# Patient Record
Sex: FEMALE | Race: WHITE | Employment: OTHER | URBAN - METROPOLITAN AREA
[De-identification: names, ages, dates, MRNs, and addresses within clinical notes are randomized per-mention and may not be internally consistent; named-entity substitution may affect disease eponyms.]

---

## 2017-01-01 ENCOUNTER — HOSPITAL ENCOUNTER (EMERGENCY)
Age: 72
Discharge: HOME | End: 2017-01-02
Attending: FAMILY MEDICINE | Admitting: FAMILY MEDICINE
Payer: MEDICARE

## 2017-01-01 ENCOUNTER — APPOINTMENT (OUTPATIENT)
Dept: GENERAL RADIOLOGY | Age: 72
End: 2017-01-01
Attending: FAMILY MEDICINE
Payer: MEDICARE

## 2017-01-01 DIAGNOSIS — R07.89 ATYPICAL CHEST PAIN: Primary | ICD-10-CM

## 2017-01-01 LAB
ALBUMIN SERPL BCP-MCNC: 4.2 G/DL (ref 3.4–5)
ALBUMIN/GLOB SERPL: 1.3 {RATIO} (ref 0.8–1.7)
ALP SERPL-CCNC: 52 U/L (ref 45–117)
ALT SERPL-CCNC: 19 U/L (ref 13–56)
ANION GAP BLD CALC-SCNC: 8 MMOL/L (ref 3–18)
AST SERPL W P-5'-P-CCNC: 12 U/L (ref 15–37)
BASOPHILS # BLD AUTO: 0 K/UL (ref 0–0.06)
BASOPHILS # BLD: 0 % (ref 0–2)
BILIRUB SERPL-MCNC: 0.3 MG/DL (ref 0.2–1)
BUN SERPL-MCNC: 18 MG/DL (ref 7–18)
BUN/CREAT SERPL: 22 (ref 12–20)
CALCIUM SERPL-MCNC: 8.9 MG/DL (ref 8.5–10.1)
CHLORIDE SERPL-SCNC: 102 MMOL/L (ref 100–108)
CK MB CFR SERPL CALC: ABNORMAL % (ref 0–4)
CK MB SERPL-MCNC: <0.5 NG/ML (ref 0.5–3.6)
CK SERPL-CCNC: 62 U/L (ref 26–192)
CO2 SERPL-SCNC: 29 MMOL/L (ref 21–32)
CREAT SERPL-MCNC: 0.83 MG/DL (ref 0.6–1.3)
DIFFERENTIAL METHOD BLD: ABNORMAL
EOSINOPHIL # BLD: 0.1 K/UL (ref 0–0.4)
EOSINOPHIL NFR BLD: 1 % (ref 0–5)
ERYTHROCYTE [DISTWIDTH] IN BLOOD BY AUTOMATED COUNT: 13.3 % (ref 11.6–14.5)
GLOBULIN SER CALC-MCNC: 3.2 G/DL (ref 2–4)
GLUCOSE SERPL-MCNC: 125 MG/DL (ref 74–99)
HCT VFR BLD AUTO: 35.7 % (ref 35–45)
HGB BLD-MCNC: 11.6 G/DL (ref 12–16)
LYMPHOCYTES # BLD AUTO: 11 % (ref 21–52)
LYMPHOCYTES # BLD: 1.1 K/UL (ref 0.9–3.6)
MAGNESIUM SERPL-MCNC: 1.7 MG/DL (ref 1.8–2.4)
MCH RBC QN AUTO: 29.8 PG (ref 24–34)
MCHC RBC AUTO-ENTMCNC: 32.5 G/DL (ref 31–37)
MCV RBC AUTO: 91.8 FL (ref 74–97)
MONOCYTES # BLD: 0.8 K/UL (ref 0.05–1.2)
MONOCYTES NFR BLD AUTO: 9 % (ref 3–10)
NEUTS SEG # BLD: 7.8 K/UL (ref 1.8–8)
NEUTS SEG NFR BLD AUTO: 79 % (ref 40–73)
PLATELET # BLD AUTO: 305 K/UL (ref 135–420)
PMV BLD AUTO: 9.4 FL (ref 9.2–11.8)
POTASSIUM SERPL-SCNC: 3.9 MMOL/L (ref 3.5–5.5)
PROT SERPL-MCNC: 7.4 G/DL (ref 6.4–8.2)
RBC # BLD AUTO: 3.89 M/UL (ref 4.2–5.3)
SODIUM SERPL-SCNC: 139 MMOL/L (ref 136–145)
TROPONIN I SERPL-MCNC: <0.02 NG/ML (ref 0–0.06)
WBC # BLD AUTO: 9.9 K/UL (ref 4.6–13.2)

## 2017-01-01 PROCEDURE — 99285 EMERGENCY DEPT VISIT HI MDM: CPT

## 2017-01-01 PROCEDURE — 93005 ELECTROCARDIOGRAM TRACING: CPT

## 2017-01-01 PROCEDURE — 71010 XR CHEST PORT: CPT

## 2017-01-01 PROCEDURE — 85379 FIBRIN DEGRADATION QUANT: CPT | Performed by: FAMILY MEDICINE

## 2017-01-01 PROCEDURE — 83735 ASSAY OF MAGNESIUM: CPT | Performed by: FAMILY MEDICINE

## 2017-01-01 PROCEDURE — 96360 HYDRATION IV INFUSION INIT: CPT

## 2017-01-01 PROCEDURE — 80053 COMPREHEN METABOLIC PANEL: CPT | Performed by: FAMILY MEDICINE

## 2017-01-01 PROCEDURE — 82550 ASSAY OF CK (CPK): CPT | Performed by: FAMILY MEDICINE

## 2017-01-01 PROCEDURE — 85025 COMPLETE CBC W/AUTO DIFF WBC: CPT | Performed by: FAMILY MEDICINE

## 2017-01-01 PROCEDURE — 96361 HYDRATE IV INFUSION ADD-ON: CPT

## 2017-01-02 VITALS
BODY MASS INDEX: 27.62 KG/M2 | DIASTOLIC BLOOD PRESSURE: 50 MMHG | OXYGEN SATURATION: 95 % | HEART RATE: 86 BPM | HEIGHT: 67 IN | TEMPERATURE: 98.2 F | SYSTOLIC BLOOD PRESSURE: 126 MMHG | RESPIRATION RATE: 17 BRPM | WEIGHT: 176 LBS

## 2017-01-02 LAB
APPEARANCE UR: CLEAR
ATRIAL RATE: 113 BPM
ATRIAL RATE: 91 BPM
BILIRUB UR QL: NEGATIVE
CALCULATED P AXIS, ECG09: 74 DEGREES
CALCULATED P AXIS, ECG09: 87 DEGREES
CALCULATED R AXIS, ECG10: 26 DEGREES
CALCULATED R AXIS, ECG10: 45 DEGREES
CALCULATED T AXIS, ECG11: 42 DEGREES
CALCULATED T AXIS, ECG11: 58 DEGREES
CK MB CFR SERPL CALC: 1.9 % (ref 0–4)
CK MB SERPL-MCNC: 1.1 NG/ML (ref 0.5–3.6)
CK SERPL-CCNC: 58 U/L (ref 26–192)
COLOR UR: YELLOW
D DIMER PPP FEU-MCNC: <0.27 UG/ML(FEU)
DIAGNOSIS, 93000: NORMAL
DIAGNOSIS, 93000: NORMAL
FLUAV AG NPH QL IA: NEGATIVE
FLUBV AG NOSE QL IA: NEGATIVE
GLUCOSE UR STRIP.AUTO-MCNC: NEGATIVE MG/DL
HGB UR QL STRIP: NEGATIVE
KETONES UR QL STRIP.AUTO: NEGATIVE MG/DL
LEUKOCYTE ESTERASE UR QL STRIP.AUTO: NEGATIVE
NITRITE UR QL STRIP.AUTO: NEGATIVE
P-R INTERVAL, ECG05: 162 MS
P-R INTERVAL, ECG05: 188 MS
PH UR STRIP: 5.5 [PH] (ref 5–8)
PROT UR STRIP-MCNC: NEGATIVE MG/DL
Q-T INTERVAL, ECG07: 328 MS
Q-T INTERVAL, ECG07: 378 MS
QRS DURATION, ECG06: 88 MS
QRS DURATION, ECG06: 94 MS
QTC CALCULATION (BEZET), ECG08: 449 MS
QTC CALCULATION (BEZET), ECG08: 464 MS
SP GR UR REFRACTOMETRY: 1.01 (ref 1–1.03)
TROPONIN I SERPL-MCNC: 0.05 NG/ML (ref 0–0.06)
UROBILINOGEN UR QL STRIP.AUTO: 0.2 EU/DL (ref 0.2–1)
VENTRICULAR RATE, ECG03: 113 BPM
VENTRICULAR RATE, ECG03: 91 BPM

## 2017-01-02 PROCEDURE — 93005 ELECTROCARDIOGRAM TRACING: CPT

## 2017-01-02 PROCEDURE — 82553 CREATINE MB FRACTION: CPT | Performed by: FAMILY MEDICINE

## 2017-01-02 PROCEDURE — 74011250636 HC RX REV CODE- 250/636: Performed by: FAMILY MEDICINE

## 2017-01-02 PROCEDURE — 87804 INFLUENZA ASSAY W/OPTIC: CPT | Performed by: FAMILY MEDICINE

## 2017-01-02 PROCEDURE — 81003 URINALYSIS AUTO W/O SCOPE: CPT | Performed by: FAMILY MEDICINE

## 2017-01-02 RX ADMIN — SODIUM CHLORIDE 1000 ML: 900 INJECTION, SOLUTION INTRAVENOUS at 00:23

## 2017-01-02 NOTE — ED NOTES
I have reviewed discharge instructions with the patient. The patient verbalized understanding. Patient armband removed and shredded  Pt out of ER with daughter.

## 2017-01-02 NOTE — ED TRIAGE NOTES
Patient arrives via EMS for cold symptoms. Patient states she was seen Thursday, dx with sinus infection and taking sulfa. Sepsis Screening completed    (  )Patient meets SIRS criteria. ( x )Patient does not meet SIRS criteria.       SIRS Criteria is achieved when two or more of the following are present   Temperature < 96.8°F (36°C) or > 100.9°F (38.3°C)   Heart Rate > 90 beats per minute   Respiratory Rate > 20 beats per minute   WBC count > 12,000 or <4,000 or > 10% bands

## 2017-01-02 NOTE — DISCHARGE INSTRUCTIONS
Chest Pain: Care Instructions  Your Care Instructions  There are many things that can cause chest pain. Some are not serious and will get better on their own in a few days. But some kinds of chest pain need more testing and treatment. Your doctor may have recommended a follow-up visit in the next 8 to 12 hours. If you are not getting better, you may need more tests or treatment. Even though your doctor has released you, you still need to watch for any problems. The doctor carefully checked you, but sometimes problems can develop later. If you have new symptoms or if your symptoms do not get better, get medical care right away. If you have worse or different chest pain or pressure that lasts more than 5 minutes or you passed out (lost consciousness), call 911 or seek other emergency help right away. A medical visit is only one step in your treatment. Even if you feel better, you still need to do what your doctor recommends, such as going to all suggested follow-up appointments and taking medicines exactly as directed. This will help you recover and help prevent future problems. How can you care for yourself at home? · Rest until you feel better. · Take your medicine exactly as prescribed. Call your doctor if you think you are having a problem with your medicine. · Do not drive after taking a prescription pain medicine. When should you call for help? Call 911 if:  · You passed out (lost consciousness). · You have severe difficulty breathing. · You have symptoms of a heart attack. These may include:  ¨ Chest pain or pressure, or a strange feeling in your chest.  ¨ Sweating. ¨ Shortness of breath. ¨ Nausea or vomiting. ¨ Pain, pressure, or a strange feeling in your back, neck, jaw, or upper belly or in one or both shoulders or arms. ¨ Lightheadedness or sudden weakness. ¨ A fast or irregular heartbeat.   After you call 911, the  may tell you to chew 1 adult-strength or 2 to 4 low-dose aspirin. Wait for an ambulance. Do not try to drive yourself. Call your doctor today if:  · You have any trouble breathing. · Your chest pain gets worse. · You are dizzy or lightheaded, or you feel like you may faint. · You are not getting better as expected. · You are having new or different chest pain. Where can you learn more? Go to http://devendra-adam.info/. Enter A120 in the search box to learn more about \"Chest Pain: Care Instructions. \"  Current as of: May 27, 2016  Content Version: 11.1  © 7103-0756 [employer]. Care instructions adapted under license by [employer] (which disclaims liability or warranty for this information). If you have questions about a medical condition or this instruction, always ask your healthcare professional. [address] any warranty or liability for your use of this information.

## 2017-01-02 NOTE — ED PROVIDER NOTES
[address]  EMERGENCY DEPARTMENT HISTORY AND PHYSICAL EXAM       Date: 1/1/2017   Patient Name: Heavenly Donato   YOB: 1945  Medical Record Number: [id number]    History of Presenting Illness     Chief Complaint   Patient presents with    Cold Symptoms        History Provided By:  patient    Additional History:   11:13 PM  Heavenly Donato is a 67 y.o. female who presents to the emergency department C/O nausea. Associated sxs of flushing of the face and a \"pounding throughout her body\", diarrhea, and palpitations. Her palpitations have improved in ED. Her pulse at home was in the 120's. She was taken to the fire station and was told to come the ED. She was seen 3 days ago and diagnosed with a sinus infection. She is currently taking Sulfa. Hx of MI, emphysema, COPD. Her current sxs do not feel like her MI sxs. No recent travel or hormone usage. She does not smoke. She is on chronic oxygen for emphysema and COPD. Patient denies CP, leg swelling, abdominal pain, SOB, and any other sxs and complaints. Primary Care Provider: Myrna Gilbert MD   Specialist:    Past History     Past Medical History:   Past Medical History   Diagnosis Date    Asthma     CAD (coronary artery disease)      MI    Cancer ([address]     Chronic obstructive pulmonary disease ([address]     Diverticulitis     Hypertension     [facility] [address]         Past Surgical History:   Past Surgical History   Procedure Laterality Date    Hx orthopaedic       cervical, lumbar    Hx gyn      Hx hysterectomy      Pr cardiac surg procedure unlist       04/16 Stent        Family History:   History reviewed. No pertinent family history. Social History:   Social History   Substance Use Topics    Smoking status: Former Smoker    Smokeless tobacco: None    Alcohol use No        Allergies:    Allergies   Allergen Reactions    Augmentin [Amoxicillin-Pot Clavulanate] Diarrhea    Lexapro [Escitalopram] Rash    Zoloft [Sertraline] Rash        Review of Systems   Review of Systems   Constitutional: Negative for fatigue. HENT: Negative for rhinorrhea and sore throat. Respiratory: Negative for cough and shortness of breath. Cardiovascular: Positive for palpitations. Negative for chest pain. Gastrointestinal: Positive for diarrhea and nausea. Negative for abdominal pain and vomiting. Genitourinary: Negative for difficulty urinating and dysuria. Musculoskeletal: Negative for arthralgias and myalgias. Skin: Negative for color change and rash. Neurological: Negative for light-headedness and headaches. All other systems reviewed and are negative. Physical Exam  Vitals:    01/02/17 0100 01/02/17 0200 01/02/17 0323 01/02/17 0400   BP: 136/59 146/66 126/57 126/50   Pulse: 99 92 92 86   Resp: 14 16 15 17   Temp:       SpO2: 98% 99% 99% 95%   Weight:       Height:           Physical Exam   Nursing note and vitals reviewed. Vital signs and nursing notes reviewed    CONSTITUTIONAL: Anxious, hoarse. Alert, in no apparent distress; well-developed; well-nourished. HEAD:  Normocephalic, atraumatic  EYES: PERRL; EOM's intact. ENTM: Nose: no rhinorrhea; Throat: no erythema or exudate, mucous membranes moist  Neck:  No JVD, supple without lymphadenopathy  RESP: Chest clear, equal breath sounds. CV: Mild tachycardia. S1 and S2 WNL; No murmurs, gallops or rubs. GI: Normal bowel sounds, abdomen soft and non-tender. No masses or organomegaly. : No costo-vertebral angle tenderness. BACK:  Non-tender  UPPER EXT:  Normal inspection  LOWER EXT: No edema, no calf tenderness. Distal pulses intact. NEURO: CN intact, reflexes 2/4 and sym, strength 5/5 and sym, sensation intact. SKIN: No rashes; Normal for age and stage.       Diagnostic Study Results     Labs -      Recent Results (from the past 12 hour(s))   EKG, 12 LEAD, INITIAL    Collection Time: 01/01/17 10:54 PM   Result Value Ref Range    Ventricular Rate 113 BPM Atrial Rate 113 BPM    P-R Interval 162 ms    QRS Duration 88 ms    Q-T Interval 328 ms    QTC Calculation (Bezet) 449 ms    Calculated P Axis 87 degrees    Calculated R Axis 45 degrees    Calculated T Axis 58 degrees    Diagnosis       Sinus tachycardia  Nonspecific ST abnormality  Abnormal ECG  When compared with ECG of 13-OCT-2016 22:27,  No significant change was found     CBC WITH AUTOMATED DIFF    Collection Time: 01/01/17 10:58 PM   Result Value Ref Range    WBC 9.9 4.6 - 13.2 K/uL    RBC 3.89 (L) 4.20 - 5.30 M/uL    HGB 11.6 (L) 12.0 - 16.0 g/dL    HCT 35.7 35.0 - 45.0 %    MCV 91.8 74.0 - 97.0 FL    MCH 29.8 24.0 - 34.0 PG    MCHC 32.5 31.0 - 37.0 g/dL    RDW 13.3 11.6 - 14.5 %    PLATELET 039 401 - 056 K/uL    MPV 9.4 9.2 - 11.8 FL    NEUTROPHILS 79 (H) 40 - 73 %    LYMPHOCYTES 11 (L) 21 - 52 %    MONOCYTES 9 3 - 10 %    EOSINOPHILS 1 0 - 5 %    BASOPHILS 0 0 - 2 %    ABS. NEUTROPHILS 7.8 1.8 - 8.0 K/UL    ABS. LYMPHOCYTES 1.1 0.9 - 3.6 K/UL    ABS. MONOCYTES 0.8 0.05 - 1.2 K/UL    ABS. EOSINOPHILS 0.1 0.0 - 0.4 K/UL    ABS. BASOPHILS 0.0 0.0 - 0.06 K/UL    DF AUTOMATED     METABOLIC PANEL, COMPREHENSIVE    Collection Time: 01/01/17 10:58 PM   Result Value Ref Range    Sodium 139 136 - 145 mmol/L    Potassium 3.9 3.5 - 5.5 mmol/L    Chloride 102 100 - 108 mmol/L    CO2 29 21 - 32 mmol/L    Anion gap 8 3.0 - 18 mmol/L    Glucose 125 (H) 74 - 99 mg/dL    BUN 18 7.0 - 18 MG/DL    Creatinine 0.83 0.6 - 1.3 MG/DL    BUN/Creatinine ratio 22 (H) 12 - 20      GFR est AA >60 >60 ml/min/1.73m2    GFR est non-AA >60 >60 ml/min/1.73m2    Calcium 8.9 8.5 - 10.1 MG/DL    Bilirubin, total 0.3 0.2 - 1.0 MG/DL    ALT 19 13 - 56 U/L    AST 12 (L) 15 - 37 U/L    Alk.  phosphatase 52 45 - 117 U/L    Protein, total 7.4 6.4 - 8.2 g/dL    Albumin 4.2 3.4 - 5.0 g/dL    Globulin 3.2 2.0 - 4.0 g/dL    A-G Ratio 1.3 0.8 - 1.7     MAGNESIUM    Collection Time: 01/01/17 10:58 PM   Result Value Ref Range    Magnesium 1.7 (L) 1.8 - 2.4 mg/dL   CARDIAC PANEL,(CK, CKMB & TROPONIN)    Collection Time: 01/01/17 10:58 PM   Result Value Ref Range    CK 62 26 - 192 U/L    CK - MB <0.5 (L) 0.5 - 3.6 ng/ml    CK-MB Index CANNOT BE CALCULATED 0.0 - 4.0 %    Troponin-I, Qt. <0.02 0.00 - 0.06 NG/ML   D DIMER    Collection Time: 01/01/17 10:58 PM   Result Value Ref Range    D DIMER <0.27 <0.46 ug/ml(FEU)   URINALYSIS W/ RFLX MICROSCOPIC    Collection Time: 01/02/17 12:20 AM   Result Value Ref Range    Color YELLOW      Appearance CLEAR      Specific gravity 1.006 1.005 - 1.030      pH (UA) 5.5 5.0 - 8.0      Protein NEGATIVE  NEG mg/dL    Glucose NEGATIVE  NEG mg/dL    Ketone NEGATIVE  NEG mg/dL    Bilirubin NEGATIVE  NEG      Blood NEGATIVE  NEG      Urobilinogen 0.2 0.2 - 1.0 EU/dL    Nitrites NEGATIVE  NEG      Leukocyte Esterase NEGATIVE  NEG     INFLUENZA A & B AG (RAPID TEST)    Collection Time: 01/02/17 12:20 AM   Result Value Ref Range    Influenza A Antigen NEGATIVE  NEG      Influenza B Antigen NEGATIVE  NEG     CARDIAC PANEL,(CK, CKMB & TROPONIN)    Collection Time: 01/02/17  2:00 AM   Result Value Ref Range    CK 58 26 - 192 U/L    CK - MB 1.1 0.5 - 3.6 ng/ml    CK-MB Index 1.9 0.0 - 4.0 %    Troponin-I, Qt. 0.05 0.00 - 0.06 NG/ML   EKG, 12 LEAD, SUBSEQUENT    Collection Time: 01/02/17  2:06 AM   Result Value Ref Range    Ventricular Rate 91 BPM    Atrial Rate 91 BPM    P-R Interval 188 ms    QRS Duration 94 ms    Q-T Interval 378 ms    QTC Calculation (Bezet) 464 ms    Calculated P Axis 74 degrees    Calculated R Axis 26 degrees    Calculated T Axis 42 degrees    Diagnosis       Normal sinus rhythm  Normal ECG  When compared with ECG of 01-JAN-2017 22:54,  No significant change was found         Radiologic Studies -    RADIOLOGY FINDINGS  Chest X-ray shows NAP  Pending review by Radiologist  Recorded by Ania Schaffer, ED Scribe, as dictated by Bob Larson MD   XR CHEST PORT    (Results Pending)        Medical Decision Making   I am the first provider for this patient. I reviewed the vital signs, available nursing notes, past medical history, past surgical history, family history and social history. Vital Signs-Reviewed the patient's vital signs. Patient Vitals for the past 12 hrs:   Temp Pulse Resp BP SpO2   01/02/17 0400 - 86 17 126/50 95 %   01/02/17 0323 - 92 15 126/57 99 %   01/02/17 0200 - 92 16 146/66 99 %   01/02/17 0100 - 99 14 136/59 98 %   01/02/17 0000 - 98 16 132/49 97 %   01/01/17 2345 - 98 18 128/55 96 %   01/01/17 2330 - 97 16 135/63 98 %   01/01/17 2315 - (!) 102 15 138/68 99 %   01/01/17 2251 98.2 °F (36.8 °C) (!) 108 18 157/66 98 %       Pulse Oximetry Analysis - Normal 99% on room air     Cardiac Monitor:   Rate: 107 bpm  Rhythm: Sinus Tachycardia      EKG interpretation: (Preliminary)  Rate: 113 bpm; sinus tachycardia, nonspecific ST abnormality  EKG read by Elis Arellano MD at 11:19 PM     EKG interpretation: (Secondary)  Rate: 91 bpm; NSR, normal ECG  EKG read by Elis Arellano MD at 2:13 AM      Old Medical Records: Old medical records. Previous electrocardiograms. Previous radiology studies. Provider Notes:     INITIAL CLINICAL IMPRESSION and PLANS:  The patient presents with the primary complaint(s) of: nausea/vomiting. The presentation, to include historical aspects and clinical findings are consistent with the DX of atypical chest pain. However, other possible DX's to consider as primary, associated with, or exacerbated by include:    1. Anxiety  2. Anemia   3. Electrolyte abnormality    Considering the above, my initial management plan to evaluate and therapeutic interventions include the following and as noted in the orders:    1. Labs: CBC, CMP, Magnesium, Cardiac panel, Urinalysis, Influenza A and B  2.   Imaging:   CXR, EKG    Procedures: none    ED Course:    Medications Given in the ED:  Medications   sodium chloride 0.9 % bolus infusion 1,000 mL (0 mL IntraVENous IV Completed 1/2/17 2131)        PROGRESS NOTE:  11:17 PM   Initial assessment performed. 12:15 AM  HR has fluctuated between 108 and 120. Will get D Dimer since she has intermediate risk. 4:00 AM   Feeling much better, no CP, no SOB, no nausea. Her vitals are stable. She is ready for discharge. Discharge Note:  4:00 AM  Pt has been reexamined. Patient has no new complaints, changes, or physical findings. Care plan outlined and precautions discussed. Results were reviewed with the patient. All medications were reviewed with the patient; will d/c home with no prescriptions. All of pt's questions and concerns were addressed. Patient was instructed and agrees to follow up with PCP, as well as to return to the ED upon further deterioration. Patient is ready to go home. Critical Care Time: none    Diagnosis   Clinical Impression:   1. Atypical chest pain         Discussion:  Pt who was recently treated for sinus infection. She was at a birthday party when she started experiencing epigastric and chest discomfort and sensation that she had to have some diarrhea. She was tachycardic on arrival. She has nonspecific EKG changes due to rate. CE negative. After fluids, repeat EKG and CE were negative. Flu swab negative, white count nl, CXR negative. D Dimer was done due to intermediate risk, was negative. Sxs completely resolved, will be discharged home. Will arrange for her to have an outpatient US done. Follow-up Information     Follow up With Details Comments ADEN Albrecht MD Call As needed [phone] [id number]      [facility] EMERGENCY DEPT  As needed, If symptoms worsen [address]  [phone]          Current Discharge Medication List      CONTINUE these medications which have NOT CHANGED    Details   clopidogrel (PLAVIX) 75 mg tablet Take 75 mg by mouth daily.       pantoprazole (PROTONIX) 40 mg tablet Take 40 mg by mouth daily.      omega-3 fatty acids-vitamin e (FISH OIL) 1,000 mg cap Take 1 Cap by mouth. Dosage is 1200mg/600mg once daily      cyanocobalamin 1,000 mcg tablet Take 1,000 mcg by mouth daily. losartan (COZAAR) 25 mg tablet Take 2 Tabs by mouth daily. Qty: 30 Tab, Refills: 3      metoprolol (LOPRESSOR) 25 mg tablet Take 1 Tab by mouth every twelve (12) hours. Qty: 60 Tab, Refills: 3      BUDESONIDE/FORMOTEROL FUMARATE (SYMBICORT IN) Take 2 Puffs by inhalation two (2) times a day. albuterol (PROVENTIL HFA, VENTOLIN HFA, PROAIR HFA) 90 mcg/actuation inhaler Take 1 Puff by inhalation as needed for Wheezing. tiotropium (SPIRIVA) 18 mcg inhalation capsule Take 1 Cap by inhalation daily. loratadine (CLARITIN) 10 mg tablet Take 10 mg by mouth daily. allergy injection by SubCUTAneous route. Does not know dosage             _______________________________   Attestations: This note is prepared by Toby Ramos, acting as a Scribe for Angie Ledesma MD on 11:13 PM on 1/1/2017. Angie Ledesma MD: The scribe's documentation has been prepared under my direction and personally reviewed by me in its entirety.   _______________________________

## 2017-02-01 ENCOUNTER — APPOINTMENT (OUTPATIENT)
Dept: GENERAL RADIOLOGY | Age: 72
End: 2017-02-01
Attending: EMERGENCY MEDICINE
Payer: MEDICARE

## 2017-02-01 ENCOUNTER — HOSPITAL ENCOUNTER (EMERGENCY)
Age: 72
Discharge: HOME | End: 2017-02-01
Attending: EMERGENCY MEDICINE | Admitting: EMERGENCY MEDICINE
Payer: MEDICARE

## 2017-02-01 VITALS
WEIGHT: 176 LBS | HEIGHT: 67 IN | SYSTOLIC BLOOD PRESSURE: 117 MMHG | HEART RATE: 91 BPM | BODY MASS INDEX: 27.62 KG/M2 | DIASTOLIC BLOOD PRESSURE: 53 MMHG | TEMPERATURE: 98 F | OXYGEN SATURATION: 100 % | RESPIRATION RATE: 17 BRPM

## 2017-02-01 DIAGNOSIS — J44.1 ACUTE EXACERBATION OF CHRONIC OBSTRUCTIVE PULMONARY DISEASE (COPD) (HCC): Primary | ICD-10-CM

## 2017-02-01 DIAGNOSIS — Z99.81 OXYGEN DEPENDENT: ICD-10-CM

## 2017-02-01 LAB
ALBUMIN SERPL BCP-MCNC: 4.1 G/DL (ref 3.4–5)
ALBUMIN/GLOB SERPL: 1.3 {RATIO} (ref 0.8–1.7)
ALP SERPL-CCNC: 54 U/L (ref 45–117)
ALT SERPL-CCNC: 22 U/L (ref 13–56)
ANION GAP BLD CALC-SCNC: 10 MMOL/L (ref 3–18)
AST SERPL W P-5'-P-CCNC: 16 U/L (ref 15–37)
ATRIAL RATE: 76 BPM
BASOPHILS # BLD AUTO: 0 K/UL (ref 0–0.06)
BASOPHILS # BLD: 0 % (ref 0–2)
BILIRUB SERPL-MCNC: 0.3 MG/DL (ref 0.2–1)
BUN SERPL-MCNC: 16 MG/DL (ref 7–18)
BUN/CREAT SERPL: 14 (ref 12–20)
CALCIUM SERPL-MCNC: 8.9 MG/DL (ref 8.5–10.1)
CALCULATED P AXIS, ECG09: 69 DEGREES
CALCULATED R AXIS, ECG10: 52 DEGREES
CALCULATED T AXIS, ECG11: 61 DEGREES
CHLORIDE SERPL-SCNC: 99 MMOL/L (ref 100–108)
CK MB CFR SERPL CALC: 1.3 % (ref 0–4)
CK MB SERPL-MCNC: 0.5 NG/ML (ref 0.5–3.6)
CK SERPL-CCNC: 40 U/L (ref 26–192)
CO2 SERPL-SCNC: 29 MMOL/L (ref 21–32)
CREAT SERPL-MCNC: 1.18 MG/DL (ref 0.6–1.3)
DIAGNOSIS, 93000: NORMAL
DIFFERENTIAL METHOD BLD: ABNORMAL
EOSINOPHIL # BLD: 0.1 K/UL (ref 0–0.4)
EOSINOPHIL NFR BLD: 1 % (ref 0–5)
ERYTHROCYTE [DISTWIDTH] IN BLOOD BY AUTOMATED COUNT: 13.3 % (ref 11.6–14.5)
GLOBULIN SER CALC-MCNC: 3.2 G/DL (ref 2–4)
GLUCOSE SERPL-MCNC: 116 MG/DL (ref 74–99)
HCT VFR BLD AUTO: 36.5 % (ref 35–45)
HGB BLD-MCNC: 11.8 G/DL (ref 12–16)
LYMPHOCYTES # BLD AUTO: 10 % (ref 21–52)
LYMPHOCYTES # BLD: 1.1 K/UL (ref 0.9–3.6)
MAGNESIUM SERPL-MCNC: 2.3 MG/DL (ref 1.8–2.4)
MCH RBC QN AUTO: 29.4 PG (ref 24–34)
MCHC RBC AUTO-ENTMCNC: 32.3 G/DL (ref 31–37)
MCV RBC AUTO: 90.8 FL (ref 74–97)
MONOCYTES # BLD: 0.9 K/UL (ref 0.05–1.2)
MONOCYTES NFR BLD AUTO: 9 % (ref 3–10)
NEUTS SEG # BLD: 8.8 K/UL (ref 1.8–8)
NEUTS SEG NFR BLD AUTO: 80 % (ref 40–73)
P-R INTERVAL, ECG05: 160 MS
PLATELET # BLD AUTO: 358 K/UL (ref 135–420)
PMV BLD AUTO: 9 FL (ref 9.2–11.8)
POTASSIUM SERPL-SCNC: 4 MMOL/L (ref 3.5–5.5)
PROT SERPL-MCNC: 7.3 G/DL (ref 6.4–8.2)
Q-T INTERVAL, ECG07: 368 MS
QRS DURATION, ECG06: 92 MS
QTC CALCULATION (BEZET), ECG08: 414 MS
RBC # BLD AUTO: 4.02 M/UL (ref 4.2–5.3)
SODIUM SERPL-SCNC: 138 MMOL/L (ref 136–145)
TROPONIN I SERPL-MCNC: <0.02 NG/ML (ref 0–0.06)
VENTRICULAR RATE, ECG03: 76 BPM
WBC # BLD AUTO: 10.9 K/UL (ref 4.6–13.2)

## 2017-02-01 PROCEDURE — 96365 THER/PROPH/DIAG IV INF INIT: CPT

## 2017-02-01 PROCEDURE — 93005 ELECTROCARDIOGRAM TRACING: CPT

## 2017-02-01 PROCEDURE — 94640 AIRWAY INHALATION TREATMENT: CPT

## 2017-02-01 PROCEDURE — 96361 HYDRATE IV INFUSION ADD-ON: CPT

## 2017-02-01 PROCEDURE — 96375 TX/PRO/DX INJ NEW DRUG ADDON: CPT

## 2017-02-01 PROCEDURE — 71020 XR CHEST PA LAT: CPT

## 2017-02-01 PROCEDURE — 74011250636 HC RX REV CODE- 250/636: Performed by: PHYSICIAN ASSISTANT

## 2017-02-01 PROCEDURE — 82550 ASSAY OF CK (CPK): CPT | Performed by: PHYSICIAN ASSISTANT

## 2017-02-01 PROCEDURE — 74011000250 HC RX REV CODE- 250: Performed by: PHYSICIAN ASSISTANT

## 2017-02-01 PROCEDURE — 99285 EMERGENCY DEPT VISIT HI MDM: CPT

## 2017-02-01 PROCEDURE — 80053 COMPREHEN METABOLIC PANEL: CPT | Performed by: PHYSICIAN ASSISTANT

## 2017-02-01 PROCEDURE — 83735 ASSAY OF MAGNESIUM: CPT | Performed by: PHYSICIAN ASSISTANT

## 2017-02-01 PROCEDURE — 85025 COMPLETE CBC W/AUTO DIFF WBC: CPT | Performed by: PHYSICIAN ASSISTANT

## 2017-02-01 PROCEDURE — 77030013140 HC MSK NEB VYRM -A

## 2017-02-01 RX ORDER — HYDROCODONE POLISTIREX AND CHLORPHENIRAMINE POLISTIREX 10; 8 MG/5ML; MG/5ML
5 SUSPENSION, EXTENDED RELEASE ORAL
Qty: 115 ML | Refills: 0 | Status: SHIPPED | OUTPATIENT
Start: 2017-02-01 | End: 2017-07-21

## 2017-02-01 RX ORDER — PREDNISONE 10 MG/1
TABLET ORAL
Qty: 43 TAB | Refills: 0 | Status: SHIPPED | OUTPATIENT
Start: 2017-02-01 | End: 2017-07-21

## 2017-02-01 RX ORDER — SODIUM CHLORIDE 9 MG/ML
1000 INJECTION, SOLUTION INTRAVENOUS ONCE
Status: COMPLETED | OUTPATIENT
Start: 2017-02-01 | End: 2017-02-01

## 2017-02-01 RX ORDER — DOXYCYCLINE HYCLATE 100 MG
100 TABLET ORAL 2 TIMES DAILY
Qty: 20 TAB | Refills: 0 | Status: SHIPPED | OUTPATIENT
Start: 2017-02-01 | End: 2017-02-11

## 2017-02-01 RX ORDER — IPRATROPIUM BROMIDE AND ALBUTEROL SULFATE 2.5; .5 MG/3ML; MG/3ML
3 SOLUTION RESPIRATORY (INHALATION)
Status: COMPLETED | OUTPATIENT
Start: 2017-02-01 | End: 2017-02-01

## 2017-02-01 RX ORDER — MAGNESIUM SULFATE HEPTAHYDRATE 40 MG/ML
2 INJECTION, SOLUTION INTRAVENOUS ONCE
Status: COMPLETED | OUTPATIENT
Start: 2017-02-01 | End: 2017-02-01

## 2017-02-01 RX ADMIN — SODIUM CHLORIDE 1000 ML: 900 INJECTION, SOLUTION INTRAVENOUS at 12:55

## 2017-02-01 RX ADMIN — METHYLPREDNISOLONE SODIUM SUCCINATE 125 MG: 125 INJECTION, POWDER, FOR SOLUTION INTRAMUSCULAR; INTRAVENOUS at 12:48

## 2017-02-01 RX ADMIN — IPRATROPIUM BROMIDE AND ALBUTEROL SULFATE 3 ML: .5; 3 SOLUTION RESPIRATORY (INHALATION) at 12:47

## 2017-02-01 RX ADMIN — MAGNESIUM SULFATE HEPTAHYDRATE 2 G: 40 INJECTION, SOLUTION INTRAVENOUS at 12:51

## 2017-02-01 NOTE — ED TRIAGE NOTES
Patient arrived with c/o cough and shortness of breath for 3 weeks. States shortness of breath worse last night after getting upset after finding out one of her old classmates had . She states she slept good last night but when she woke up today her sob was worse and a breathing treatment did not help. Sepsis Screening completed    ( x )Patient meets SIRS criteria. (  )Patient does not meet SIRS criteria.       SIRS Criteria is achieved when two or more of the following are present   Temperature < 96.8°F (36°C) or > 100.9°F (38.3°C)   Heart Rate > 90 beats per minute   Respiratory Rate > 20 beats per minute   WBC count > 12,000 or <4,000 or > 10% bands

## 2017-02-01 NOTE — DISCHARGE INSTRUCTIONS
Chronic Obstructive Pulmonary Disease (COPD): Care Instructions  Your Care Instructions    Chronic obstructive pulmonary disease (COPD) is a general term for a group of lung diseases, including emphysema and chronic bronchitis. People with COPD have decreased airflow in and out of the lungs, which makes it hard to breathe. The airways also can get clogged with thick mucus. Cigarette smoking is a major cause of COPD. Although there is no cure for COPD, you can slow its progress. Following your treatment plan and taking care of yourself can help you feel better and live longer. Follow-up care is a key part of your treatment and safety. Be sure to make and go to all appointments, and call your doctor if you are having problems. It's also a good idea to know your test results and keep a list of the medicines you take. How can you care for yourself at home? Staying healthy  · Do not smoke. This is the most important step you can take to prevent more damage to your lungs. If you need help quitting, talk to your doctor about stop-smoking programs and medicines. These can increase your chances of quitting for good. · Avoid colds and flu. Get a pneumococcal vaccine shot. If you have had one before, ask your doctor whether you need a second dose. Get the flu vaccine every fall. If you must be around people with colds or the flu, wash your hands often. · Avoid secondhand smoke, air pollution, and high altitudes. Also avoid cold, dry air and hot, humid air. Stay at home with your windows closed when air pollution is bad. Medicines and oxygen therapy  · Take your medicines exactly as prescribed. Call your doctor if you think you are having a problem with your medicine. · You may be taking medicines such as:  ¨ Bronchodilators. These help open your airways and make breathing easier. Bronchodilators are either short-acting (work for 6 to 9 hours) or long-acting (work for 24 hours).  You inhale most bronchodilators, so they start to act quickly. Always carry your quick-relief inhaler with you in case you need it while you are away from home. ¨ Corticosteroids (prednisone, budesonide). These reduce airway inflammation. They come in pill or inhaled form. You must take these medicines every day for them to work well. · A spacer may help you get more inhaled medicine to your lungs. Ask your doctor or pharmacist if a spacer is right for you. If it is, ask how to use it properly. · Do not take any vitamins, over-the-counter medicine, or herbal products without talking to your doctor first.  · If your doctor prescribed antibiotics, take them as directed. Do not stop taking them just because you feel better. You need to take the full course of antibiotics. · Oxygen therapy boosts the amount of oxygen in your blood and helps you breathe easier. Use the flow rate your doctor has recommended, and do not change it without talking to your doctor first.  Activity  · Get regular exercise. Walking is an easy way to get exercise. Start out slowly, and walk a little more each day. · Pay attention to your breathing. You are exercising too hard if you cannot talk while you are exercising. · Take short rest breaks when doing household chores and other activities. · Learn breathing methods--such as breathing through pursed lips--to help you become less short of breath. · If your doctor has not set you up with a pulmonary rehabilitation program, talk to him or her about whether rehab is right for you. Rehab includes exercise programs, education about your disease and how to manage it, help with diet and other changes, and emotional support. Diet  · Eat regular, healthy meals. Use bronchodilators about 1 hour before you eat to make it easier to eat. Eat several small meals instead of three large ones. Drink beverages at the end of the meal. Avoid foods that are hard to chew.   · Eat foods that contain protein so that you do not lose muscle mass.  Mental health  · Talk to your family, friends, or a therapist about your feelings. It is normal to feel frightened, angry, hopeless, helpless, and even guilty. Talking openly about bad feelings can help you cope. If these feelings last, talk to your doctor. When should you call for help? Call 911 anytime you think you may need emergency care. For example, call if:  · You have severe trouble breathing. Call your doctor now or seek immediate medical care if:  · You have new or worse trouble breathing. · You cough up blood. · You have a fever. Watch closely for changes in your health, and be sure to contact your doctor if:  · You cough more deeply or more often, especially if you notice more mucus or a change in the color of your mucus. · You have new or worse swelling in your legs or belly. · You are not getting better as expected. Where can you learn more? Go to http://devendraNephosityadam.info/. Rahel Valentino in the search box to learn more about \"Chronic Obstructive Pulmonary Disease (COPD): Care Instructions. \"  Current as of: May 23, 2016  Content Version: 11.1  © 4393-7803 [employer]. Care instructions adapted under license by [employer] (which disclaims liability or warranty for this information). If you have questions about a medical condition or this instruction, always ask your healthcare professional. [place] [address] any warranty or liability for your use of this information. COPD Exacerbation Plan: Care Instructions  Your Care Instructions  If you have chronic obstructive pulmonary disease (COPD), your usual shortness of breath could suddenly get worse. You may start coughing more and have more mucus. This flare-up is called a COPD exacerbation (say \"qy-[id number]-BAY-shun\"). A lung infection or air pollution could set off an exacerbation. Sometimes it can happen after a quick change in temperature or being around chemicals.   Work with your doctor to make a plan for dealing with an exacerbation. You can better manage it if you plan ahead. Follow-up care is a key part of your treatment and safety. Be sure to make and go to all appointments, and call your doctor if you are having problems. It's also a good idea to know your test results and keep a list of the medicines you take. How can you care for yourself at home? During an exacerbation  · Do not panic if you start to have one. Quick treatment at home may help you prevent serious breathing problems. If you have a COPD exacerbation plan that you developed with your doctor, follow it. · Take your medicines exactly as your doctor tells you. ¨ Use your inhaler as directed by your doctor. If your symptoms do not get better after you use your medicine, have someone take you to the emergency room. Call an ambulance if necessary. ¨ With inhaled medicines, a spacer or a nebulizer may help you get more medicine to your lungs. Ask your doctor or pharmacist how to use them properly. Practice using the spacer in front of a mirror before you have an exacerbation. This may help you get the medicine into your lungs quickly. ¨ If your doctor has given you steroid pills, take them as directed. ¨ Your doctor may have given you a prescription for antibiotics, which you can fill if you need it. ¨ Talk to your doctor if you have any problems with your medicine. And call your doctor if you have to use your antibiotic or steroid pills. Preventing an exacerbation  · Do not smoke. This is the most important step you can take to prevent more damage to your lungs and prevent problems. If you already smoke, it is never too late to stop. If you need help quitting, talk to your doctor about stop-smoking programs and medicines. These can increase your chances of quitting for good. · Take your daily medicines as prescribed. · Avoid colds and flu. ¨ Get a pneumococcal vaccine.   ¨ Get a flu vaccine each year, as soon as it is available. Ask those you live or work with to do the same, so they will not get the flu and infect you. ¨ Try to stay away from people with colds or the flu. ¨ Wash your hands often. · Avoid secondhand smoke; air pollution; cold, dry air; hot, humid air; and high altitudes. Stay at home with your windows closed when air pollution is bad. · Learn breathing techniques for COPD, such as breathing through pursed lips. These techniques can help you breathe easier during an exacerbation. When should you call for help? Call 911 anytime you think you may need emergency care. For example, call if:  · You have severe trouble breathing. · You have severe chest pain. Call your doctor now or seek immediate medical care if:  · You have new or worse shortness of breath. · You develop new chest pain. · You are coughing more deeply or more often, especially if you notice more mucus or a change in the color of your mucus. · You cough up blood. · You have new or increased swelling in your legs or belly. · You have a fever. Watch closely for changes in your health, and be sure to contact your doctor if:  · You need to use your antibiotic or steroid pills. · Your symptoms are getting worse. Where can you learn more? Go to http://devendra-adam.info/. Enter V284 in the search box to learn more about \"COPD Exacerbation Plan: Care Instructions. \"  Current as of: July 21, 2016  Content Version: 11.1  © 2774-5964 [employer]. Care instructions adapted under license by [employer] (which disclaims liability or warranty for this information). If you have questions about a medical condition or this instruction, always ask your healthcare professional. [place] [address] any warranty or liability for your use of this information.

## 2017-02-01 NOTE — ED PROVIDER NOTES
HPI Comments:   11:43 AM   Mickey Ernandez is a 67 y.o. female with a hx of HTN, CAD, MI (w/ stent placement, followed by Juan M Herrera MD), and COPD/emphasema (chronically on 2L oxygen at home, followed by Joaquim Dandy, MD) presenting to the ED C/O cold/URI sxs, waxing and waning but persistent x 4-5 weeks. Associated sxs include chest congestion, productive cough, wheezing, SOB, nasal congestion/rhinorrhea (white colored), and chest pressure. She states she was seen by Donn Johnson MD (ENT) several weeks ago for complaints of same and was put on Bactrim and Predisone taper, the latter of which she completed 3 days ago. States no relief of sxs with those rx medications or home breathing treatments x 2 last night and this morning. Previous smoker (quit in 1999). On Plavix. Pt denies fever, LE swelling, and any other Sx or complaints. Patient is a 67 y.o. female presenting with shortness of breath. The history is provided by the patient. No  was used. Shortness of Breath   This is a chronic problem. The current episode started more than 1 week ago. The problem has not changed since onset. Associated symptoms include rhinorrhea, cough (productive), wheezing and chest pain (pressure). Pertinent negatives include no fever, no headaches, no sore throat, no vomiting, no rash and no leg swelling. Associated medical issues include COPD, CAD and past MI. Past Medical History:   Diagnosis Date    Asthma     CAD (coronary artery disease)      MI    Cancer ([address]     Chronic obstructive pulmonary disease (HCC)     Diverticulitis     Hypertension     Melan[facility] [address] [address]        Past Surgical History:   Procedure Laterality Date    Hx orthopaedic       cervical, lumbar    Hx gyn      Hx hysterectomy      Pr cardiac surg procedure unlist       04/16 Stent         History reviewed. No pertinent family history.     Social History     Social History    Marital status:      Spouse name: N/A    Number of children: N/A    Years of education: N/A     Occupational History    Not on file. Social History Main Topics    Smoking status: Former Smoker    Smokeless tobacco: Not on file    Alcohol use No    Drug use: No    Sexual activity: Not on file     Other Topics Concern    Not on file     Social History Narrative         ALLERGIES: Augmentin [amoxicillin-pot clavulanate]; Lexapro [escitalopram]; and Zoloft [sertraline]    Review of Systems   Constitutional: Negative for chills and fever. HENT: Positive for congestion and rhinorrhea. Negative for sore throat. Respiratory: Positive for cough (productive), shortness of breath and wheezing.         (+) chest congestion   Cardiovascular: Positive for chest pain (pressure). Negative for leg swelling. Gastrointestinal: Negative for nausea and vomiting. Genitourinary: Negative for difficulty urinating, dysuria, flank pain, frequency, hematuria and urgency. Musculoskeletal: Negative for arthralgias, back pain and joint swelling. Skin: Negative for rash and wound. Allergic/Immunologic: Negative for environmental allergies and immunocompromised state. Neurological: Negative for dizziness, light-headedness and headaches. Hematological: Negative for adenopathy. Bruises/bleeds easily (on Plavix). Vitals:    02/01/17 1309 02/01/17 1330 02/01/17 1335 02/01/17 1400   BP: 128/82 136/54  117/53   Pulse: 87 90 88 91   Resp: 16 20 15 17   Temp:       SpO2:   99% 100%   Weight:       Height:                Physical Exam   Constitutional: She is oriented to person, place, and time. She appears well-developed and well-nourished. No distress.  geriatric female in NAD. Alert. No resp distress. On 2L NC. No resp distress or acc muscle use. HENT:   Head: Normocephalic and atraumatic. Right Ear: External ear normal. No swelling or tenderness. Tympanic membrane is not perforated, not erythematous and not bulging.    Left Ear: External ear normal. No swelling or tenderness. Tympanic membrane is not perforated, not erythematous and not bulging. Nose: Nose normal. No mucosal edema or rhinorrhea. Right sinus exhibits no maxillary sinus tenderness and no frontal sinus tenderness. Left sinus exhibits no maxillary sinus tenderness and no frontal sinus tenderness. Mouth/Throat: Uvula is midline, oropharynx is clear and moist and mucous membranes are normal. No oral lesions. No trismus in the jaw. No dental abscesses or uvula swelling. No oropharyngeal exudate, posterior oropharyngeal edema, posterior oropharyngeal erythema or tonsillar abscesses. Eyes: Conjunctivae are normal. Right eye exhibits no discharge. Left eye exhibits no discharge. No scleral icterus. Neck: Normal range of motion. Neck supple. Cardiovascular: Normal rate, regular rhythm, normal heart sounds and intact distal pulses. Exam reveals no gallop and no friction rub. No murmur heard. Pulmonary/Chest: Effort normal. No accessory muscle usage. No tachypnea. No respiratory distress. She has no decreased breath sounds. She has wheezes in the right upper field, the right middle field, the right lower field, the left upper field, the left middle field and the left lower field. She has no rhonchi. She has no rales. Musculoskeletal: Normal range of motion. She exhibits no edema or tenderness. Lymphadenopathy:     She has no cervical adenopathy. Neurological: She is alert and oriented to person, place, and time. Skin: Skin is warm and dry. No rash noted. She is not diaphoretic. No erythema. Psychiatric: She has a normal mood and affect. Judgment normal.   Nursing note and vitals reviewed. RESULTS:    EKG interpretation: (Preliminary)  12:13 PM  Sinus rhythm with marked sinus arrhythmia, rate 76 bpm. Incomplete RBBB. Septal infarct, age undetermined. Abnormal EKG. No PONCHO.   EKG read by Radha Clark PA-C    XR CHEST PA LAT   Final Result:  1.  Mild hyperinflation. 2. No acute pulmonary disease. As read by the radiologist.            Labs Reviewed   METABOLIC PANEL, COMPREHENSIVE - Abnormal; Notable for the following:        Result Value    Chloride 99 (*)     Glucose 116 (*)     GFR est AA 54 (*)     GFR est non-AA 45 (*)     All other components within normal limits   CBC WITH AUTOMATED DIFF - Abnormal; Notable for the following:     RBC 4.02 (*)     HGB 11.8 (*)     MPV 9.0 (*)     NEUTROPHILS 80 (*)     LYMPHOCYTES 10 (*)     ABS. NEUTROPHILS 8.8 (*)     All other components within normal limits   CARDIAC PANEL,(CK, CKMB & TROPONIN)   MAGNESIUM       No results found for this or any previous visit (from the past 12 hour(s)). MDM  Number of Diagnoses or Management Options  Acute exacerbation of chronic obstructive pulmonary disease (COPD) ([facility] [address]):   Oxygen dependent:   Diagnosis management comments: ACS/MI, arrhythmia, COPD, PTX, CHF, PNA, bronchitis. Doubt PE       Amount and/or Complexity of Data Reviewed  Clinical lab tests: ordered and reviewed  Tests in the radiology section of CPT®: ordered and reviewed (CXR)  Tests in the medicine section of CPT®: reviewed and ordered (EKG)  Independent visualization of images, tracings, or specimens: yes (EKG, CXR)      ED Course     MEDICATIONS GIVEN:  Medications   0.9% sodium chloride infusion 1,000 mL (0 mL IntraVENous IV Completed 2/1/17 1425)   methylPREDNISolone (PF) (SOLU-MEDROL) injection 125 mg (125 mg IntraVENous Given 2/1/17 1248)   albuterol-ipratropium (DUO-NEB) 2.5 MG-0.5 MG/3 ML (3 mL Nebulization Given 2/1/17 1247)   magnesium sulfate 2 g/50 ml IVPB (premix or compounded) (0 g IntraVENous IV Completed 2/1/17 1351)        Procedures    PROGRESS NOTE:   11:43 AM  Initial assessment performed. Written by Devan Webb ED Scribe, as dictated by Jennifer Tang PA-C    PROGRESS NOTE:   1:27 PM  Pt has been re-examined by Jennifer Tang PA-C. Pt is feeling better. Lungs are CTAB with wheezing resolved. Will tx as COPD exacerbation. Prednisone taper. Albuterol. Doxy. Antitussive. Close pulmonology FU. Reasons to RTED discussed with pt. All questions answered. Pt feels comfortable going home at this time. Pt expressed understanding and she agrees with plan. DISCHARGE NOTE:  1:43 PM   Mickey Chandra's  results have been reviewed with her. She has been counseled regarding her diagnosis, treatment, and plan. She verbally conveys understanding and agreement of the signs, symptoms, diagnosis, treatment and prognosis and additionally agrees to follow up as discussed. She also agrees with the care-plan and conveys that all of her questions have been answered. I have also provided discharge instructions for her that include: educational information regarding their diagnosis and treatment, and list of reasons why they would want to return to the ED prior to their follow-up appointment, should her condition change. The patient and/or family have been provided with education for proper Emergency Department utilization. CLINICAL IMPRESSION:    1. Acute exacerbation of chronic obstructive pulmonary disease (COPD) ([facility] [address]    2. Oxygen dependent        AFTER VISIT PLAN:    Discharge Medication List as of 2/1/2017  1:44 PM      START taking these medications    Details   predniSONE (DELTASONE) 10 mg tablet Take 6 pills for 2 days, 5 pills for 2 days, 4 pills for 2 days, 3 pills for 2 days, 2 pills for 2 days, 1 pill for 2 days, 1/2 pill for 2 days. Take with food. , Print, Disp-43 Tab, R-0      doxycycline (VIBRA-TABS) 100 mg tablet Take 1 Tab by mouth two (2) times a day for 10 days. , Print, Disp-20 Tab, R-0         CONTINUE these medications which have NOT CHANGED    Details   SPIRONOLACTONE (ALDACTONE PO) Take  by mouth., Historical Med      clopidogrel (PLAVIX) 75 mg tablet Take 75 mg by mouth daily. , Historical Med      pantoprazole (PROTONIX) 40 mg tablet Take 40 mg by mouth daily. , Historical Med      omega-3 fatty acids-vitamin e (FISH OIL) 1,000 mg cap Take 1 Cap by mouth. Dosage is 1200mg/600mg once daily, Historical Med      cyanocobalamin 1,000 mcg tablet Take 1,000 mcg by mouth daily. , Historical Med      losartan (COZAAR) 25 mg tablet Take 2 Tabs by mouth daily. , Print, Disp-30 Tab, R-3      metoprolol (LOPRESSOR) 25 mg tablet Take 1 Tab by mouth every twelve (12) hours. , Print, Disp-60 Tab, R-3      BUDESONIDE/FORMOTEROL FUMARATE (SYMBICORT IN) Take 2 Puffs by inhalation two (2) times a day., Historical Med      albuterol (PROVENTIL HFA, VENTOLIN HFA, PROAIR HFA) 90 mcg/actuation inhaler Take 1 Puff by inhalation as needed for Wheezing., Historical Med      tiotropium (SPIRIVA) 18 mcg inhalation capsule Take 1 Cap by inhalation daily. , Historical Med      loratadine (CLARITIN) 10 mg tablet Take 10 mg by mouth daily. , Historical Med      allergy injection by SubCUTAneous route. Does not know dosage, Historical Med              Follow-up Information     Follow up With Details Comments [id number] Elijah Lyons MD Schedule an appointment as soon as possible for a visit Pulmonology follow up [address] [place] [address].      [facility] EMERGENCY DEPT  As needed, If symptoms worsen [address] [id number]  [phone]             Attestations: This note is prepared by Bee Cho, acting as Scribe for Moraima Christian PA-C. Moraima Christian PA-C: The scribe's documentation has been prepared under my direction and personally reviewed by me in its entirety. I confirm that the note above accurately reflects all work, treatment, procedures, and medical decision making performed by me.

## 2017-07-21 ENCOUNTER — APPOINTMENT (OUTPATIENT)
Dept: CT IMAGING | Age: 72
End: 2017-07-21
Attending: EMERGENCY MEDICINE
Payer: MEDICARE

## 2017-07-21 ENCOUNTER — HOSPITAL ENCOUNTER (EMERGENCY)
Age: 72
Discharge: HOME | End: 2017-07-21
Attending: EMERGENCY MEDICINE
Payer: MEDICARE

## 2017-07-21 VITALS
BODY MASS INDEX: 27.4 KG/M2 | OXYGEN SATURATION: 100 % | TEMPERATURE: 98.6 F | HEART RATE: 81 BPM | RESPIRATION RATE: 26 BRPM | HEIGHT: 67 IN | WEIGHT: 174.6 LBS | DIASTOLIC BLOOD PRESSURE: 53 MMHG | SYSTOLIC BLOOD PRESSURE: 139 MMHG

## 2017-07-21 DIAGNOSIS — R42 VERTIGO: Primary | ICD-10-CM

## 2017-07-21 LAB
ALBUMIN SERPL BCP-MCNC: 4.2 G/DL (ref 3.4–5)
ALBUMIN/GLOB SERPL: 1.3 {RATIO} (ref 0.8–1.7)
ALP SERPL-CCNC: 64 U/L (ref 45–117)
ALT SERPL-CCNC: 20 U/L (ref 13–56)
ANION GAP BLD CALC-SCNC: 8 MMOL/L (ref 3–18)
APPEARANCE UR: CLEAR
AST SERPL W P-5'-P-CCNC: 14 U/L (ref 15–37)
BACTERIA URNS QL MICRO: ABNORMAL /HPF
BASOPHILS # BLD AUTO: 0 K/UL (ref 0–0.06)
BASOPHILS # BLD: 0 % (ref 0–2)
BILIRUB SERPL-MCNC: 0.5 MG/DL (ref 0.2–1)
BILIRUB UR QL: NEGATIVE
BUN SERPL-MCNC: 12 MG/DL (ref 7–18)
BUN/CREAT SERPL: 14 (ref 12–20)
CALCIUM SERPL-MCNC: 9.2 MG/DL (ref 8.5–10.1)
CHLORIDE SERPL-SCNC: 101 MMOL/L (ref 100–108)
CK MB CFR SERPL CALC: NORMAL % (ref 0–4)
CK MB SERPL-MCNC: <1 NG/ML (ref 5–25)
CK SERPL-CCNC: 66 U/L (ref 26–192)
CO2 SERPL-SCNC: 31 MMOL/L (ref 21–32)
COLOR UR: YELLOW
CREAT SERPL-MCNC: 0.84 MG/DL (ref 0.6–1.3)
DIFFERENTIAL METHOD BLD: ABNORMAL
EOSINOPHIL # BLD: 0.1 K/UL (ref 0–0.4)
EOSINOPHIL NFR BLD: 1 % (ref 0–5)
EPITH CASTS URNS QL MICRO: ABNORMAL /LPF (ref 0–5)
ERYTHROCYTE [DISTWIDTH] IN BLOOD BY AUTOMATED COUNT: 13.3 % (ref 11.6–14.5)
GLOBULIN SER CALC-MCNC: 3.3 G/DL (ref 2–4)
GLUCOSE SERPL-MCNC: 115 MG/DL (ref 74–99)
GLUCOSE UR STRIP.AUTO-MCNC: NEGATIVE MG/DL
HCT VFR BLD AUTO: 35.7 % (ref 35–45)
HGB BLD-MCNC: 11.9 G/DL (ref 12–16)
HGB UR QL STRIP: NEGATIVE
KETONES UR QL STRIP.AUTO: NEGATIVE MG/DL
LEUKOCYTE ESTERASE UR QL STRIP.AUTO: ABNORMAL
LYMPHOCYTES # BLD AUTO: 16 % (ref 21–52)
LYMPHOCYTES # BLD: 1.4 K/UL (ref 0.9–3.6)
MCH RBC QN AUTO: 30.1 PG (ref 24–34)
MCHC RBC AUTO-ENTMCNC: 33.3 G/DL (ref 31–37)
MCV RBC AUTO: 90.2 FL (ref 74–97)
MONOCYTES # BLD: 0.8 K/UL (ref 0.05–1.2)
MONOCYTES NFR BLD AUTO: 9 % (ref 3–10)
NEUTS SEG # BLD: 6.3 K/UL (ref 1.8–8)
NEUTS SEG NFR BLD AUTO: 74 % (ref 40–73)
NITRITE UR QL STRIP.AUTO: NEGATIVE
PH UR STRIP: 7.5 [PH] (ref 5–8)
PLATELET # BLD AUTO: 281 K/UL (ref 135–420)
PMV BLD AUTO: 9.2 FL (ref 9.2–11.8)
POTASSIUM SERPL-SCNC: 3.5 MMOL/L (ref 3.5–5.5)
PROT SERPL-MCNC: 7.5 G/DL (ref 6.4–8.2)
PROT UR STRIP-MCNC: NEGATIVE MG/DL
RBC # BLD AUTO: 3.96 M/UL (ref 4.2–5.3)
RBC #/AREA URNS HPF: ABNORMAL /HPF (ref 0–5)
SODIUM SERPL-SCNC: 140 MMOL/L (ref 136–145)
SP GR UR REFRACTOMETRY: 1.01 (ref 1–1.03)
TROPONIN I SERPL-MCNC: <0.02 NG/ML (ref 0–0.06)
UROBILINOGEN UR QL STRIP.AUTO: 0.2 EU/DL (ref 0.2–1)
WBC # BLD AUTO: 8.6 K/UL (ref 4.6–13.2)
WBC URNS QL MICRO: ABNORMAL /HPF (ref 0–5)

## 2017-07-21 PROCEDURE — 81001 URINALYSIS AUTO W/SCOPE: CPT | Performed by: EMERGENCY MEDICINE

## 2017-07-21 PROCEDURE — 96361 HYDRATE IV INFUSION ADD-ON: CPT

## 2017-07-21 PROCEDURE — 96374 THER/PROPH/DIAG INJ IV PUSH: CPT

## 2017-07-21 PROCEDURE — 93005 ELECTROCARDIOGRAM TRACING: CPT

## 2017-07-21 PROCEDURE — 70450 CT HEAD/BRAIN W/O DYE: CPT

## 2017-07-21 PROCEDURE — 74011250636 HC RX REV CODE- 250/636: Performed by: EMERGENCY MEDICINE

## 2017-07-21 PROCEDURE — 99285 EMERGENCY DEPT VISIT HI MDM: CPT

## 2017-07-21 PROCEDURE — 85025 COMPLETE CBC W/AUTO DIFF WBC: CPT | Performed by: EMERGENCY MEDICINE

## 2017-07-21 PROCEDURE — 80053 COMPREHEN METABOLIC PANEL: CPT | Performed by: EMERGENCY MEDICINE

## 2017-07-21 PROCEDURE — 82550 ASSAY OF CK (CPK): CPT | Performed by: EMERGENCY MEDICINE

## 2017-07-21 RX ORDER — SPIRONOLACTONE 25 MG/1
12.5 TABLET ORAL 2 TIMES DAILY
Status: ON HOLD | COMMUNITY
End: 2018-05-04

## 2017-07-21 RX ORDER — MECLIZINE HYDROCHLORIDE 25 MG/1
TABLET ORAL
Qty: 20 TAB | Refills: 0 | Status: ON HOLD | OUTPATIENT
Start: 2017-07-21 | End: 2018-05-01

## 2017-07-21 RX ORDER — ONDANSETRON 4 MG/1
4 TABLET, ORALLY DISINTEGRATING ORAL
Qty: 6 TAB | Refills: 0 | Status: ON HOLD | OUTPATIENT
Start: 2017-07-21 | End: 2018-05-01

## 2017-07-21 RX ORDER — ONDANSETRON 2 MG/ML
4 INJECTION INTRAMUSCULAR; INTRAVENOUS
Status: COMPLETED | OUTPATIENT
Start: 2017-07-21 | End: 2017-07-21

## 2017-07-21 RX ORDER — SIMVASTATIN 40 MG/1
40 TABLET, FILM COATED ORAL
Status: ON HOLD | COMMUNITY
End: 2018-05-01

## 2017-07-21 RX ORDER — HYDROCHLOROTHIAZIDE 12.5 MG/1
12.5 TABLET ORAL DAILY
Status: ON HOLD | COMMUNITY
End: 2018-05-01

## 2017-07-21 RX ORDER — MECLIZINE HCL 12.5 MG 12.5 MG/1
25 TABLET ORAL
Status: DISCONTINUED | OUTPATIENT
Start: 2017-07-21 | End: 2017-07-21 | Stop reason: HOSPADM

## 2017-07-21 RX ORDER — OMEPRAZOLE 20 MG/1
20 CAPSULE, DELAYED RELEASE ORAL DAILY
Status: ON HOLD | COMMUNITY
End: 2018-05-01

## 2017-07-21 RX ADMIN — SODIUM CHLORIDE 1000 ML: 900 INJECTION, SOLUTION INTRAVENOUS at 11:08

## 2017-07-21 RX ADMIN — ONDANSETRON 4 MG: 2 INJECTION INTRAMUSCULAR; INTRAVENOUS at 11:09

## 2017-07-21 NOTE — ED TRIAGE NOTES
Pt brought by EMS from home for complaints of dizziness, shaking, and nausea. Pt w/ hx of previous MI x 1 year ago. Pt showing sinus tachycardia at 105 bpm and FSBS of 124 mg/dL for EMS. Pt also reporting burning in her arms that has since gone away. Pt states, \"I had similar symptoms when I had my heart attack last year and it makes me nervous. I've been here seven or eight times since my heart attack and they diagnosed me with anxiety but I don't take any medication. \" Pt arrives A&O x 4 and denies current symptoms or pain. Pt appears anxious. Pt on 2L of NC O2 continuously r/t hx of COPD. Sepsis Screening completed    (  )Patient meets SIRS criteria. ( X )Patient does not meet SIRS criteria.       SIRS Criteria is achieved when two or more of the following are present   Temperature < 96.8°F (36°C) or > 100.9°F (38.3°C)   Heart Rate > 90 beats per minute   Respiratory Rate > 20 breaths per minute   WBC count > 12,000 or <4,000 or > 10% bands

## 2017-07-21 NOTE — ED PROVIDER NOTES
[address]  EMERGENCY DEPARTMENT HISTORY AND PHYSICAL EXAM       Date: 7/21/2017   Patient Name: Danile Smith   YOB: 1945  Medical Record Number: [id number]    History of Presenting Illness     Chief Complaint   Patient presents with    Dizziness        History Provided By:  patient    Additional History: 9:37 AM   Daniel Smith is a 67 y.o. female who presents to the emergency department via EMS C/O 2 episodes of dizziness described as feeling off balance onset yesterday and this morning. Associated symptoms include nausea and dry heaving. Reports she ate cereal and coffee for breakfast this morning prior to dizziness. Pt states she is due for her allergy shot today through ENT specialist. Pt currently on 2L O2 via NC due to hx COPD. PSHx of 1 coronary stent placement, hysterectomy, lumbar & cervical surgery, left shoulder melanoma surgery. PMHx of MI 1 year ago, HTN, asthma, CA melanoma, and CAD. Current medications include Symbicort, Spiriva, Lopressor, Plavix, Aldactone, cholesterol medication, beta blocker, and Cozaar. FHx of stroke (grandfather and father at 58 y/o). Pt denies numbness, tingling, vomiting, any pain, EtOH use, tobacco use, ear pain, tinnitis, congestion, lightheadedness, weakness, and any other Sx or complaints. Primary Care Provider: Selene Barker MD   Specialist:    Past History     Past Medical History:   Past Medical History:   Diagnosis Date    Asthma     CAD (coronary artery disease)     MI    Cancer ([address]     Chronic obstructive pulmonary disease ([address]     Diverticulitis     Hypertension     Melanoma ([address]         Past Surgical History:   Past Surgical History:   Procedure Laterality Date    CARDIAC SURG PROCEDURE UNLIST      04/16 Stent    HX GYN      HX HYSTERECTOMY      HX ORTHOPAEDIC      cervical, lumbar        Family History:   History reviewed. No pertinent family history.      Social History:   Social History Substance Use Topics    Smoking status: Former Smoker    Smokeless tobacco: None    Alcohol use No        Allergies: Allergies   Allergen Reactions    Augmentin [Amoxicillin-Pot Clavulanate] Diarrhea    Lexapro [Escitalopram] Rash    Zoloft [Sertraline] Rash        Review of Systems   Review of Systems   HENT: Positive for congestion. Negative for ear pain, hearing loss and tinnitus. Gastrointestinal: Positive for nausea. Negative for vomiting. Musculoskeletal: Negative for arthralgias and myalgias. Neurological: Positive for dizziness. Negative for speech difficulty, weakness, light-headedness and numbness. All other systems reviewed and are negative. Physical Exam  Vitals:    07/21/17 0930 07/21/17 1000 07/21/17 1030 07/21/17 1056   BP: 151/66 141/68 144/68 144/68   Pulse: 84 82 86 87   Resp: 19 14 12    Temp:       SpO2: 98% 98% 98%    Weight:       Height:           Physical Exam   Constitutional: She is oriented to person, place, and time. She appears well-developed and well-nourished. No distress. HENT:   Head: Normocephalic and atraumatic. Left TM has chronic unusual appearance that pt states Frank Morris has had for years. \"   Eyes: Pupils are equal, round, and reactive to light. Right eye exhibits nystagmus (horizontal). Neck: Neck supple. Cardiovascular: Normal rate, regular rhythm, S1 normal, S2 normal and normal heart sounds. Pulmonary/Chest: Breath sounds normal. No respiratory distress. She has no wheezes. She has no rales. She exhibits no tenderness. Abdominal: Soft. She exhibits no distension and no mass. There is no tenderness. There is no guarding. Musculoskeletal: Normal range of motion. She exhibits no edema or tenderness. Neurological: She is alert and oriented to person, place, and time. No cranial nerve deficit. Skin: No rash noted. Psychiatric: She has a normal mood and affect.  Her behavior is normal. Thought content normal.   Nursing note and vitals reviewed. Diagnostic Study Results     Labs -      Recent Results (from the past 12 hour(s))   CBC WITH AUTOMATED DIFF    Collection Time: 07/21/17  9:13 AM   Result Value Ref Range    WBC 8.6 4.6 - 13.2 K/uL    RBC 3.96 (L) 4.20 - 5.30 M/uL    HGB 11.9 (L) 12.0 - 16.0 g/dL    HCT 35.7 35.0 - 45.0 %    MCV 90.2 74.0 - 97.0 FL    MCH 30.1 24.0 - 34.0 PG    MCHC 33.3 31.0 - 37.0 g/dL    RDW 13.3 11.6 - 14.5 %    PLATELET 138 440 - 194 K/uL    MPV 9.2 9.2 - 11.8 FL    NEUTROPHILS 74 (H) 40 - 73 %    LYMPHOCYTES 16 (L) 21 - 52 %    MONOCYTES 9 3 - 10 %    EOSINOPHILS 1 0 - 5 %    BASOPHILS 0 0 - 2 %    ABS. NEUTROPHILS 6.3 1.8 - 8.0 K/UL    ABS. LYMPHOCYTES 1.4 0.9 - 3.6 K/UL    ABS. MONOCYTES 0.8 0.05 - 1.2 K/UL    ABS. EOSINOPHILS 0.1 0.0 - 0.4 K/UL    ABS. BASOPHILS 0.0 0.0 - 0.06 K/UL    DF AUTOMATED     METABOLIC PANEL, COMPREHENSIVE    Collection Time: 07/21/17  9:13 AM   Result Value Ref Range    Sodium 140 136 - 145 mmol/L    Potassium 3.5 3.5 - 5.5 mmol/L    Chloride 101 100 - 108 mmol/L    CO2 31 21 - 32 mmol/L    Anion gap 8 3.0 - 18 mmol/L    Glucose 115 (H) 74 - 99 mg/dL    BUN 12 7.0 - 18 MG/DL    Creatinine 0.84 0.6 - 1.3 MG/DL    BUN/Creatinine ratio 14 12 - 20      GFR est AA >60 >60 ml/min/1.73m2    GFR est non-AA >60 >60 ml/min/1.73m2    Calcium 9.2 8.5 - 10.1 MG/DL    Bilirubin, total 0.5 0.2 - 1.0 MG/DL    ALT (SGPT) 20 13 - 56 U/L    AST (SGOT) 14 (L) 15 - 37 U/L    Alk.  phosphatase 64 45 - 117 U/L    Protein, total 7.5 6.4 - 8.2 g/dL    Albumin 4.2 3.4 - 5.0 g/dL    Globulin 3.3 2.0 - 4.0 g/dL    A-G Ratio 1.3 0.8 - 1.7     CARDIAC PANEL,(CK, CKMB & TROPONIN)    Collection Time: 07/21/17  9:13 AM   Result Value Ref Range    CK 66 26 - 192 U/L    CK - MB <1.0 <3.6 ng/ml    CK-MB Index  0.0 - 4.0 %     CALCULATION NOT PERFORMED WHEN RESULT IS BELOW LINEAR LIMIT    Troponin-I, Qt. <0.02 0.00 - 0.06 NG/ML   EKG, 12 LEAD, INITIAL    Collection Time: 07/21/17  9:19 AM   Result Value Ref Range Ventricular Rate 82 BPM    Atrial Rate 82 BPM    P-R Interval 160 ms    QRS Duration 90 ms    Q-T Interval 378 ms    QTC Calculation (Bezet) 441 ms    Calculated P Axis 75 degrees    Calculated R Axis 57 degrees    Calculated T Axis 63 degrees    Diagnosis       Sinus rhythm with marked sinus arrhythmia  Otherwise normal ECG  When compared with ECG of 01-FEB-2017 12:13,  Incomplete right bundle branch block is no longer present  Criteria for Septal infarct are no longer present     URINALYSIS W/ RFLX MICROSCOPIC    Collection Time: 07/21/17 11:15 AM   Result Value Ref Range    Color YELLOW      Appearance CLEAR      Specific gravity 1.006 1.005 - 1.030      pH (UA) 7.5 5.0 - 8.0      Protein NEGATIVE  NEG mg/dL    Glucose NEGATIVE  NEG mg/dL    Ketone NEGATIVE  NEG mg/dL    Bilirubin NEGATIVE  NEG      Blood NEGATIVE  NEG      Urobilinogen 0.2 0.2 - 1.0 EU/dL    Nitrites NEGATIVE  NEG      Leukocyte Esterase TRACE (A) NEG     URINE MICROSCOPIC ONLY    Collection Time: 07/21/17 11:15 AM   Result Value Ref Range    WBC 0 to 2 0 - 5 /hpf    RBC 0 to 1 0 - 5 /hpf    Epithelial cells 1+ 0 - 5 /lpf    Bacteria FEW (A) NEG /hpf       Radiologic Studies -  The following have been ordered and reviewed:   CT HEAD WO CONT   Final Result   IMPRESSION:     1. No acute intracranial abnormality    As read by the radiologist.          Medical Decision Making   I am the first provider for this patient. I reviewed the vital signs, available nursing notes, past medical history, past surgical history, family history and social history. Vital Signs-Reviewed the patient's vital signs.    Patient Vitals for the past 12 hrs:   Temp Pulse Resp BP SpO2   07/21/17 1056 - 87 - 144/68 -   07/21/17 1030 - 86 12 144/68 98 %   07/21/17 1000 - 82 14 141/68 98 %   07/21/17 0930 - 84 19 151/66 98 %   07/21/17 0908 98.6 °F (37 °C) 92 16 166/68 96 %       Pulse Oximetry Analysis - Normal 96% on 2L O2 via NC, no further interventions needed at this time     Cardiac Monitor:   Rate: 87 bpm  Rhythm: Sinus Rhythm with marked sinus arrhythmia     EKG interpretation: (Preliminary)  Rhythm: Sinus Rhythm with marked sinus arrhythmia. Rate (approx.): 82 bpm; Normal QRS. EKG read by Garrel Kanner, MD at 9:19 AM    Old Medical Records: Nursing notes. Provider Notes:   INITIAL CLINICAL IMPRESSION and PLANS:  The patient presents with the primary complaint(s) of: dizziness. The presentation, to include historical aspects and clinical findings are consistent with the DX of orthostasis. However, other possible DX's to consider as primary, associated with, or exacerbated by include:    1. TIA  2. Vertigo  3. Labyrinthitis  4. Anxiety  5. Electrolyte abnormalities  6. Medication reaction    Considering the above, my initial management plan to evaluate and therapeutic interventions include the following and as noted in the orders:    1. CT Head  2. EKG  3.  CMP, cardiac panel, CBC    Recorded by Kvng Mohr ED Scribe, as dictated by Garrel Kanner, MD.     Procedures:   Procedures    ED Course:  9:37 AM  Initial assessment performed. The patients presenting problems have been discussed, and they are in agreement with the care plan formulated and outlined with them. I have encouraged them to ask questions as they arise throughout their visit. 10:39 AM Pt feels much better. 12:20 PM Pt is feeling much better. Her dizziness is gone and she is comfortable going home. Medications Given in the ED:  Medications   meclizine (ANTIVERT) tablet 25 mg (25 mg Oral Refused 7/21/17 1109)   ondansetron (ZOFRAN) injection 4 mg (4 mg IntraVENous Given 7/21/17 1109)   sodium chloride 0.9 % bolus infusion 1,000 mL (1,000 mL IntraVENous New Bag 7/21/17 1108)       Discharge Note:  12:28 PM  Pt has been reexamined. Patient has no new complaints, changes, or physical findings. Care plan outlined and precautions discussed.   Results were reviewed with the patient. All medications were reviewed with the patient; will d/c home with Zofran and Antivert. All of pt's questions and concerns were addressed. Patient was instructed and agrees to follow up with PCP, as well as to return to the ED upon further deterioration. Patient is ready to go home. Diagnosis   Clinical Impression:   1. Vertigo           Follow-up Information     Follow up With Details Comments Contact Katelin David MD Call in 2 days For follow up with PCP [place] [id number] [address] EMERGENCY DEPT Go to As needed, If symptoms worsen [address] Darci Kruger  [phone]          Current Discharge Medication List      START taking these medications    Details   ondansetron (ZOFRAN ODT) 4 mg disintegrating tablet Take 1 Tab by mouth every eight (8) hours as needed for Nausea. Qty: 6 Tab, Refills: 0      meclizine (ANTIVERT) 25 mg tablet Take 1 tablet by mouth every 6 hours for the next 3 days. Then stop taking the meclizine. Restart the meclizine for 3 day intervals if vertigo/ dizziness returns. Qty: 20 Tab, Refills: 0             _______________________________   Attestations: This note is prepared by Wilda Ramirez, acting as a Scribe for Yassine Gibbs MD on 9:36 AM on 7/21/2017. Yassine Gibbs MD: The scribe's documentation has been prepared under my direction and personally reviewed by me in its entirety.   _______________________________

## 2017-07-21 NOTE — DISCHARGE INSTRUCTIONS
Vertigo: Care Instructions  Your Care Instructions  Vertigo is the feeling that you or your surroundings are moving when there is no actual movement. It is often described as a feeling of spinning, whirling, falling, or tilting. Vertigo may make you vomit or feel nauseated. You may have trouble standing or walking and may lose your balance. Vertigo is often related to an inner ear problem, but it can have other more serious causes. If vertigo continues, you may need more tests to find its cause. Follow-up care is a key part of your treatment and safety. Be sure to make and go to all appointments, and call your doctor if you are having problems. Its also a good idea to know your test results and keep a list of the medicines you take. How can you care for yourself at home? · Do not lie flat on your back. Prop yourself up slightly. This may reduce the spinning feeling. Keep your eyes open. · Move slowly so that you do not fall. · If your doctor recommends medicine, take it exactly as directed. · Do not drive while you are having vertigo. Certain exercises, called Genao-Daroff exercises, can help decrease vertigo. To do Genao-Daroff exercises:  · Sit on the edge of a bed or sofa and quickly lie down on the side that causes the worst vertigo. Lie on your side with your ear down. · Stay in this position for at least 30 seconds or until the vertigo goes away. · Sit up. If this causes vertigo, wait for it to stop. · Repeat the procedure on the other side. · Repeat this 10 times. Do these exercises 2 times a day until the vertigo is gone. When should you call for help? Call 911 anytime you think you may need emergency care. For example, call if:  · You passed out (lost consciousness). · You have symptoms of a stroke. These may include:  ¨ Sudden numbness, tingling, weakness, or loss of movement in your face, arm, or leg, especially on only one side of your body. ¨ Sudden vision changes.   ¨ Sudden trouble speaking. ¨ Sudden confusion or trouble understanding simple statements. ¨ Sudden problems with walking or balance. ¨ A sudden, severe headache that is different from past headaches. Call your doctor now or seek immediate medical care if:  · Vertigo occurs with a fever, a headache, or ringing in your ears. · You have new or increased nausea and vomiting. Watch closely for changes in your health, and be sure to contact your doctor if:  · Vertigo gets worse or happens more often. · Vertigo has not gotten better after 2 weeks. Where can you learn more? Go to http://devendra-adam.info/. Enter P251 in the search box to learn more about \"Vertigo: Care Instructions. \"  Current as of: July 29, 2016  Content Version: 11.3  © 8804-8310 [employer]. Care instructions adapted under license by [employer] (which disclaims liability or warranty for this information). If you have questions about a medical condition or this instruction, always ask your healthcare professional. [place] [address] any warranty or liability for your use of this information.

## 2017-07-21 NOTE — ED NOTES
Pt assisted to restroom via wheelchair. Pt denies dizziness or SOB. Ambulates with slow, steady gait.

## 2017-07-21 NOTE — ED NOTES
Discharge instructions reviewed with the patient with opportunity for questions given. The patient verbalized understanding. Patient armband removed and shredded. Patient in stable condition. Son en route to take pt home.

## 2017-07-22 LAB
ATRIAL RATE: 82 BPM
CALCULATED P AXIS, ECG09: 75 DEGREES
CALCULATED R AXIS, ECG10: 57 DEGREES
CALCULATED T AXIS, ECG11: 63 DEGREES
DIAGNOSIS, 93000: NORMAL
P-R INTERVAL, ECG05: 160 MS
Q-T INTERVAL, ECG07: 378 MS
QRS DURATION, ECG06: 90 MS
QTC CALCULATION (BEZET), ECG08: 441 MS
VENTRICULAR RATE, ECG03: 82 BPM

## 2018-05-01 ENCOUNTER — HOSPITAL ENCOUNTER (INPATIENT)
Age: 73
LOS: 3 days | Discharge: HOME | DRG: 281 | End: 2018-05-04
Attending: INTERNAL MEDICINE | Admitting: HOSPITALIST
Payer: MEDICARE

## 2018-05-01 ENCOUNTER — APPOINTMENT (OUTPATIENT)
Dept: GENERAL RADIOLOGY | Age: 73
DRG: 281 | End: 2018-05-01
Attending: PHYSICIAN ASSISTANT
Payer: MEDICARE

## 2018-05-01 DIAGNOSIS — R77.8 ELEVATED TROPONIN: Primary | ICD-10-CM

## 2018-05-01 DIAGNOSIS — I21.4 NSTEMI (NON-ST ELEVATED MYOCARDIAL INFARCTION) (HCC): ICD-10-CM

## 2018-05-01 PROBLEM — I25.10 CAD (CORONARY ARTERY DISEASE): Status: ACTIVE | Noted: 2018-05-01

## 2018-05-01 LAB
ALBUMIN SERPL-MCNC: 4 G/DL (ref 3.4–5)
ALBUMIN/GLOB SERPL: 1.3 {RATIO} (ref 0.8–1.7)
ALP SERPL-CCNC: 68 U/L (ref 45–117)
ALT SERPL-CCNC: 18 U/L (ref 13–56)
ANION GAP SERPL CALC-SCNC: 12 MMOL/L (ref 3–18)
ANION GAP SERPL CALC-SCNC: 9 MMOL/L (ref 3–18)
APPEARANCE UR: CLEAR
AST SERPL-CCNC: 17 U/L (ref 15–37)
ATRIAL RATE: 87 BPM
ATRIAL RATE: 92 BPM
BACTERIA URNS QL MICRO: NEGATIVE /HPF
BASOPHILS # BLD: 0 K/UL (ref 0–0.06)
BASOPHILS NFR BLD: 0 % (ref 0–2)
BILIRUB SERPL-MCNC: 0.4 MG/DL (ref 0.2–1)
BILIRUB UR QL: NEGATIVE
BUN SERPL-MCNC: 14 MG/DL (ref 7–18)
BUN SERPL-MCNC: 15 MG/DL (ref 7–18)
BUN/CREAT SERPL: 19 (ref 12–20)
BUN/CREAT SERPL: 19 (ref 12–20)
CALCIUM SERPL-MCNC: 8.7 MG/DL (ref 8.5–10.1)
CALCIUM SERPL-MCNC: 8.7 MG/DL (ref 8.5–10.1)
CALCULATED P AXIS, ECG09: 67 DEGREES
CALCULATED P AXIS, ECG09: 75 DEGREES
CALCULATED R AXIS, ECG10: 62 DEGREES
CALCULATED R AXIS, ECG10: 63 DEGREES
CALCULATED T AXIS, ECG11: 63 DEGREES
CALCULATED T AXIS, ECG11: 68 DEGREES
CHLORIDE SERPL-SCNC: 104 MMOL/L (ref 100–108)
CHLORIDE SERPL-SCNC: 106 MMOL/L (ref 100–108)
CK MB CFR SERPL CALC: 1.6 % (ref 0–4)
CK MB CFR SERPL CALC: 2.2 % (ref 0–4)
CK MB CFR SERPL CALC: 2.5 % (ref 0–4)
CK MB SERPL-MCNC: 1.1 NG/ML (ref 5–25)
CK MB SERPL-MCNC: 1.4 NG/ML (ref 5–25)
CK MB SERPL-MCNC: 1.9 NG/ML (ref 5–25)
CK SERPL-CCNC: 65 U/L (ref 26–192)
CK SERPL-CCNC: 70 U/L (ref 26–192)
CK SERPL-CCNC: 77 U/L (ref 26–192)
CO2 SERPL-SCNC: 26 MMOL/L (ref 21–32)
CO2 SERPL-SCNC: 31 MMOL/L (ref 21–32)
COLOR UR: YELLOW
CREAT SERPL-MCNC: 0.73 MG/DL (ref 0.6–1.3)
CREAT SERPL-MCNC: 0.77 MG/DL (ref 0.6–1.3)
DIAGNOSIS, 93000: NORMAL
DIAGNOSIS, 93000: NORMAL
DIFFERENTIAL METHOD BLD: ABNORMAL
EOSINOPHIL # BLD: 0.1 K/UL (ref 0–0.4)
EOSINOPHIL NFR BLD: 1 % (ref 0–5)
EPITH CASTS URNS QL MICRO: NORMAL /LPF (ref 0–5)
ERYTHROCYTE [DISTWIDTH] IN BLOOD BY AUTOMATED COUNT: 13.8 % (ref 11.6–14.5)
ERYTHROCYTE [DISTWIDTH] IN BLOOD BY AUTOMATED COUNT: 13.8 % (ref 11.6–14.5)
GLOBULIN SER CALC-MCNC: 3 G/DL (ref 2–4)
GLUCOSE SERPL-MCNC: 100 MG/DL (ref 74–99)
GLUCOSE SERPL-MCNC: 125 MG/DL (ref 74–99)
GLUCOSE UR STRIP.AUTO-MCNC: NEGATIVE MG/DL
HCT VFR BLD AUTO: 34.4 % (ref 35–45)
HCT VFR BLD AUTO: 34.8 % (ref 35–45)
HGB BLD-MCNC: 11.1 G/DL (ref 12–16)
HGB BLD-MCNC: 11.3 G/DL (ref 12–16)
HGB UR QL STRIP: NEGATIVE
KETONES UR QL STRIP.AUTO: NEGATIVE MG/DL
LEUKOCYTE ESTERASE UR QL STRIP.AUTO: ABNORMAL
LYMPHOCYTES # BLD: 1.1 K/UL (ref 0.9–3.6)
LYMPHOCYTES NFR BLD: 17 % (ref 21–52)
MCH RBC QN AUTO: 29.8 PG (ref 24–34)
MCH RBC QN AUTO: 29.8 PG (ref 24–34)
MCHC RBC AUTO-ENTMCNC: 32.3 G/DL (ref 31–37)
MCHC RBC AUTO-ENTMCNC: 32.5 G/DL (ref 31–37)
MCV RBC AUTO: 91.8 FL (ref 74–97)
MCV RBC AUTO: 92.5 FL (ref 74–97)
MONOCYTES # BLD: 0.5 K/UL (ref 0.05–1.2)
MONOCYTES NFR BLD: 9 % (ref 3–10)
NEUTS SEG # BLD: 4.4 K/UL (ref 1.8–8)
NEUTS SEG NFR BLD: 73 % (ref 40–73)
NITRITE UR QL STRIP.AUTO: NEGATIVE
P-R INTERVAL, ECG05: 174 MS
P-R INTERVAL, ECG05: 184 MS
PH UR STRIP: 6 [PH] (ref 5–8)
PLATELET # BLD AUTO: 228 K/UL (ref 135–420)
PLATELET # BLD AUTO: 248 K/UL (ref 135–420)
PMV BLD AUTO: 9.2 FL (ref 9.2–11.8)
PMV BLD AUTO: 9.5 FL (ref 9.2–11.8)
POTASSIUM SERPL-SCNC: 3.8 MMOL/L (ref 3.5–5.5)
POTASSIUM SERPL-SCNC: 3.8 MMOL/L (ref 3.5–5.5)
PROT SERPL-MCNC: 7 G/DL (ref 6.4–8.2)
PROT UR STRIP-MCNC: NEGATIVE MG/DL
Q-T INTERVAL, ECG07: 372 MS
Q-T INTERVAL, ECG07: 374 MS
QRS DURATION, ECG06: 88 MS
QRS DURATION, ECG06: 88 MS
QTC CALCULATION (BEZET), ECG08: 447 MS
QTC CALCULATION (BEZET), ECG08: 462 MS
RBC # BLD AUTO: 3.72 M/UL (ref 4.2–5.3)
RBC # BLD AUTO: 3.79 M/UL (ref 4.2–5.3)
RBC #/AREA URNS HPF: NEGATIVE /HPF (ref 0–5)
SODIUM SERPL-SCNC: 144 MMOL/L (ref 136–145)
SODIUM SERPL-SCNC: 144 MMOL/L (ref 136–145)
SP GR UR REFRACTOMETRY: 1.01 (ref 1–1.03)
TROPONIN I SERPL-MCNC: 0.04 NG/ML (ref 0–0.06)
TROPONIN I SERPL-MCNC: 0.17 NG/ML (ref 0–0.06)
TROPONIN I SERPL-MCNC: 0.26 NG/ML (ref 0–0.06)
TROPONIN I SERPL-MCNC: <0.02 NG/ML (ref 0–0.06)
UROBILINOGEN UR QL STRIP.AUTO: 0.2 EU/DL (ref 0.2–1)
VENTRICULAR RATE, ECG03: 87 BPM
VENTRICULAR RATE, ECG03: 92 BPM
WBC # BLD AUTO: 6.1 K/UL (ref 4.6–13.2)
WBC # BLD AUTO: 7 K/UL (ref 4.6–13.2)
WBC URNS QL MICRO: NORMAL /HPF (ref 0–5)

## 2018-05-01 PROCEDURE — 80053 COMPREHEN METABOLIC PANEL: CPT | Performed by: PHYSICIAN ASSISTANT

## 2018-05-01 PROCEDURE — 85027 COMPLETE CBC AUTOMATED: CPT | Performed by: HOSPITALIST

## 2018-05-01 PROCEDURE — 82550 ASSAY OF CK (CPK): CPT | Performed by: PHYSICIAN ASSISTANT

## 2018-05-01 PROCEDURE — 74011250636 HC RX REV CODE- 250/636: Performed by: PHYSICIAN ASSISTANT

## 2018-05-01 PROCEDURE — 93005 ELECTROCARDIOGRAM TRACING: CPT

## 2018-05-01 PROCEDURE — 85025 COMPLETE CBC W/AUTO DIFF WBC: CPT | Performed by: PHYSICIAN ASSISTANT

## 2018-05-01 PROCEDURE — 99285 EMERGENCY DEPT VISIT HI MDM: CPT

## 2018-05-01 PROCEDURE — 81001 URINALYSIS AUTO W/SCOPE: CPT | Performed by: PHYSICIAN ASSISTANT

## 2018-05-01 PROCEDURE — 96360 HYDRATION IV INFUSION INIT: CPT

## 2018-05-01 PROCEDURE — 80048 BASIC METABOLIC PNL TOTAL CA: CPT | Performed by: HOSPITALIST

## 2018-05-01 PROCEDURE — 71045 X-RAY EXAM CHEST 1 VIEW: CPT

## 2018-05-01 PROCEDURE — 94761 N-INVAS EAR/PLS OXIMETRY MLT: CPT

## 2018-05-01 PROCEDURE — 65660000000 HC RM CCU STEPDOWN

## 2018-05-01 PROCEDURE — 36415 COLL VENOUS BLD VENIPUNCTURE: CPT | Performed by: HOSPITALIST

## 2018-05-01 RX ORDER — PANTOPRAZOLE SODIUM 40 MG/1
40 TABLET, DELAYED RELEASE ORAL DAILY
Status: DISCONTINUED | OUTPATIENT
Start: 2018-05-02 | End: 2018-05-02

## 2018-05-01 RX ORDER — METOPROLOL TARTRATE 25 MG/1
25 TABLET, FILM COATED ORAL EVERY 12 HOURS
Status: CANCELLED | OUTPATIENT
Start: 2018-05-01

## 2018-05-01 RX ORDER — GUAIFENESIN 100 MG/5ML
81 LIQUID (ML) ORAL DAILY
Status: DISCONTINUED | OUTPATIENT
Start: 2018-05-02 | End: 2018-05-04 | Stop reason: HOSPADM

## 2018-05-01 RX ORDER — ASCORBIC ACID 500 MG
800 TABLET ORAL DAILY
COMMUNITY
End: 2019-06-14

## 2018-05-01 RX ORDER — BISMUTH SUBSALICYLATE 262 MG
1 TABLET,CHEWABLE ORAL DAILY
COMMUNITY

## 2018-05-01 RX ORDER — NITROGLYCERIN 0.4 MG/1
0.4 TABLET SUBLINGUAL AS NEEDED
Status: DISCONTINUED | OUTPATIENT
Start: 2018-05-01 | End: 2018-05-04 | Stop reason: HOSPADM

## 2018-05-01 RX ORDER — ATORVASTATIN CALCIUM 20 MG/1
40 TABLET, FILM COATED ORAL DAILY
Status: DISCONTINUED | OUTPATIENT
Start: 2018-05-02 | End: 2018-05-04 | Stop reason: HOSPADM

## 2018-05-01 RX ORDER — AMOXICILLIN AND CLAVULANATE POTASSIUM 875; 125 MG/1; MG/1
1 TABLET, FILM COATED ORAL 2 TIMES DAILY WITH MEALS
Status: DISCONTINUED | OUTPATIENT
Start: 2018-05-02 | End: 2018-05-01

## 2018-05-01 RX ORDER — METOPROLOL TARTRATE 25 MG/1
25 TABLET, FILM COATED ORAL EVERY 12 HOURS
Status: DISCONTINUED | OUTPATIENT
Start: 2018-05-02 | End: 2018-05-04

## 2018-05-01 RX ORDER — BUDESONIDE AND FORMOTEROL FUMARATE DIHYDRATE 160; 4.5 UG/1; UG/1
2 AEROSOL RESPIRATORY (INHALATION) 2 TIMES DAILY
COMMUNITY

## 2018-05-01 RX ORDER — ENOXAPARIN SODIUM 100 MG/ML
80 INJECTION SUBCUTANEOUS
Status: COMPLETED | OUTPATIENT
Start: 2018-05-01 | End: 2018-05-01

## 2018-05-01 RX ORDER — CLOPIDOGREL BISULFATE 75 MG/1
75 TABLET ORAL DAILY
Status: DISCONTINUED | OUTPATIENT
Start: 2018-05-02 | End: 2018-05-04 | Stop reason: HOSPADM

## 2018-05-01 RX ORDER — LANOLIN ALCOHOL/MO/W.PET/CERES
1000 CREAM (GRAM) TOPICAL DAILY
Status: DISCONTINUED | OUTPATIENT
Start: 2018-05-02 | End: 2018-05-01

## 2018-05-01 RX ORDER — LOSARTAN POTASSIUM 25 MG/1
25 TABLET ORAL 2 TIMES DAILY
Status: DISCONTINUED | OUTPATIENT
Start: 2018-05-02 | End: 2018-05-04 | Stop reason: HOSPADM

## 2018-05-01 RX ORDER — AMOXICILLIN AND CLAVULANATE POTASSIUM 875; 125 MG/1; MG/1
1 TABLET, FILM COATED ORAL EVERY 12 HOURS
Status: ON HOLD | COMMUNITY
End: 2018-05-04

## 2018-05-01 RX ORDER — LOSARTAN POTASSIUM 50 MG/1
50 TABLET ORAL DAILY
Status: DISCONTINUED | OUTPATIENT
Start: 2018-05-02 | End: 2018-05-01

## 2018-05-01 RX ORDER — SIMVASTATIN 20 MG/1
40 TABLET, FILM COATED ORAL
Status: CANCELLED | OUTPATIENT
Start: 2018-05-01

## 2018-05-01 RX ORDER — SPIRONOLACTONE 25 MG/1
25 TABLET ORAL DAILY
Status: CANCELLED | OUTPATIENT
Start: 2018-05-02

## 2018-05-01 RX ORDER — FLUTICASONE PROPIONATE 50 MCG
SPRAY, SUSPENSION (ML) NASAL
Status: DISCONTINUED | OUTPATIENT
Start: 2018-05-01 | End: 2018-05-01

## 2018-05-01 RX ORDER — CLOPIDOGREL BISULFATE 75 MG/1
75 TABLET ORAL DAILY
Status: CANCELLED | OUTPATIENT
Start: 2018-05-02

## 2018-05-01 RX ORDER — LORATADINE 10 MG/1
10 TABLET ORAL DAILY
Status: DISCONTINUED | OUTPATIENT
Start: 2018-05-02 | End: 2018-05-04 | Stop reason: HOSPADM

## 2018-05-01 RX ORDER — LOSARTAN POTASSIUM 50 MG/1
50 TABLET ORAL DAILY
Status: CANCELLED | OUTPATIENT
Start: 2018-05-02

## 2018-05-01 RX ORDER — FLUTICASONE PROPIONATE 50 MCG
2 SPRAY, SUSPENSION (ML) NASAL DAILY
Status: DISCONTINUED | OUTPATIENT
Start: 2018-05-02 | End: 2018-05-02

## 2018-05-01 RX ORDER — FLUTICASONE FUROATE AND VILANTEROL 100; 25 UG/1; UG/1
1 POWDER RESPIRATORY (INHALATION) DAILY
Status: DISCONTINUED | OUTPATIENT
Start: 2018-05-02 | End: 2018-05-04 | Stop reason: HOSPADM

## 2018-05-01 RX ORDER — ONDANSETRON 2 MG/ML
4 INJECTION INTRAMUSCULAR; INTRAVENOUS
Status: DISCONTINUED | OUTPATIENT
Start: 2018-05-01 | End: 2018-05-04 | Stop reason: HOSPADM

## 2018-05-01 RX ORDER — SPIRONOLACTONE 25 MG/1
25 TABLET ORAL DAILY
Status: DISCONTINUED | OUTPATIENT
Start: 2018-05-02 | End: 2018-05-02

## 2018-05-01 RX ORDER — ENOXAPARIN SODIUM 100 MG/ML
80 INJECTION SUBCUTANEOUS EVERY 12 HOURS
Status: DISCONTINUED | OUTPATIENT
Start: 2018-05-02 | End: 2018-05-03

## 2018-05-01 RX ORDER — HYDROCHLOROTHIAZIDE 25 MG/1
12.5 TABLET ORAL DAILY
Status: DISCONTINUED | OUTPATIENT
Start: 2018-05-02 | End: 2018-05-01

## 2018-05-01 RX ORDER — OMEPRAZOLE 20 MG/1
20 CAPSULE, DELAYED RELEASE ORAL DAILY
Status: CANCELLED | OUTPATIENT
Start: 2018-05-02

## 2018-05-01 RX ORDER — ASCORBIC ACID 250 MG
500 TABLET ORAL DAILY
Status: DISCONTINUED | OUTPATIENT
Start: 2018-05-02 | End: 2018-05-04 | Stop reason: HOSPADM

## 2018-05-01 RX ORDER — SIMVASTATIN 40 MG/1
40 TABLET, FILM COATED ORAL
Status: DISCONTINUED | OUTPATIENT
Start: 2018-05-02 | End: 2018-05-01

## 2018-05-01 RX ORDER — ACETAMINOPHEN 325 MG/1
650 TABLET ORAL
Status: DISCONTINUED | OUTPATIENT
Start: 2018-05-01 | End: 2018-05-04 | Stop reason: HOSPADM

## 2018-05-01 RX ORDER — ATORVASTATIN CALCIUM 40 MG/1
40 TABLET, FILM COATED ORAL DAILY
COMMUNITY

## 2018-05-01 RX ORDER — AMOXICILLIN AND CLAVULANATE POTASSIUM 875; 125 MG/1; MG/1
1 TABLET, FILM COATED ORAL 2 TIMES DAILY WITH MEALS
Status: DISCONTINUED | OUTPATIENT
Start: 2018-05-02 | End: 2018-05-04 | Stop reason: HOSPADM

## 2018-05-01 RX ORDER — HYDRALAZINE HYDROCHLORIDE 20 MG/ML
20 INJECTION INTRAMUSCULAR; INTRAVENOUS
Status: DISCONTINUED | OUTPATIENT
Start: 2018-05-01 | End: 2018-05-04 | Stop reason: HOSPADM

## 2018-05-01 RX ADMIN — ENOXAPARIN SODIUM 80 MG: 80 INJECTION SUBCUTANEOUS at 21:42

## 2018-05-01 RX ADMIN — SODIUM CHLORIDE 1000 ML: 900 INJECTION, SOLUTION INTRAVENOUS at 17:49

## 2018-05-01 NOTE — ED TRIAGE NOTES
Patient arrives via EMS with c/o HTN. Patient was wearing a halter monitor at home when the monitor alarms went off. Patient with concerned checked her HR and BP and it was noted elevated. Patient has not taken her BP meds today  EMS on seen reports /102 and . En route EMS admin 324 mg ASA. Patient denies chest pain or any other sxs. Patient on 2L O2 via NC at all times. Sepsis Screening completed    (  )Patient meets SIRS criteria. (x  )Patient does not meet SIRS criteria.       SIRS Criteria is achieved when two or more of the following are present   Temperature < 96.8°F (36°C) or > 100.9°F (38.3°C)   Heart Rate > 90 beats per minute   Respiratory Rate > 20 breaths per minute   WBC count > 12,000 or <4,000 or > 10% bands

## 2018-05-01 NOTE — IP AVS SNAPSHOT
Summary of Care Report The Summary of Care report has been created to help improve care coordination. Users with access to [employer] or [address] (Web-based application) may access additional patient information including the Discharge Summary. If you are not currently a [address] user and need more information, please call the number listed below in the [place] [address] section and ask to be connected with Medical Records. Facility Information Name Address Phone [address] [address] [phone] Patient Information Patient Name Sex  Fay Amador ([id number]) Female 1945 Discharge Information Admitting Provider Service Area Unit Brittni Osorio MD / [phone] [address]/Med / [phone] Discharge Provider Discharge Date/Time Discharge Disposition Destination (none) 2018 (Pending) AHR (none) Patient Language Language ENGLISH [13] Hospital Problems as of 2018  Reviewed: 2018 10:29 PM by Brittni Osorio MD  
  
  
  
 Class Noted - Resolved Last Modified POA Active Problems COPD (chronic obstructive pulmonary disease) ([facility] [address]  2016 - Present 2018 by Brittni Osorio MD Yes Entered by Christopher Bence, MD  
  * (Principal)Elevated troponin  2018 - Present 2018 by Brittni Osorio MD Yes Entered by LALO Christianson  
  CAD (coronary artery disease)  2018 - Present 2018 by Brittni Osorio MD Yes Entered by Brittni Osorio MD  
  NSTEMI (non-ST elevated myocardial infarction) ([facility] [address]  2018 - Present 2018 by Otis Malave PA-C Unknown Entered by Otis Malave PA-C Non-Hospital Problems as of 2018  Reviewed: 2018 10:29 PM by Brittni Osorio MD  
  
  
  
 Class Noted - Resolved Last Modified Active Problems ST elevation (STEMI) myocardial infarction involving right coronary artery ([facility] [address]  4/20/2016 - Present 4/20/2016 by Britton Boas, MD  
  Entered by Britton Boas, MD  
  Benign essential HTN  4/20/2016 - Present 4/20/2016 by Britton Boas, MD  
  Entered by Britton Boas, MD  
  Dyslipidemia, goal LDL below 70  4/20/2016 - Present 4/20/2016 by Britton Boas, MD  
  Entered by Britton Boas, MD  
  
You are allergic to the following Allergen Reactions Lexapro (Escitalopram) Rash  
    
 Zoloft (Sertraline) Rash Current Discharge Medication List  
  
START taking these medications Dose & Instructions Dispensing Information Comments  
 aspirin 81 mg chewable tablet Start taking on:  5/5/2018 Dose:  81 mg Take 1 Tab by mouth daily. Quantity:  30 Tab Refills:  0  
   
 guaiFENesin  mg ER tablet Commonly known as:  [place] [address] Dose:  600 mg Take 1 Tab by mouth every twelve (12) hours. Quantity:  10 Tab Refills:  0 CONTINUE these medications which have CHANGED Dose & Instructions Dispensing Information Comments  
 spironolactone 25 mg tablet Commonly known as:  ALDACTONE What changed:  Another medication with the same name was removed. Continue taking this medication, and follow the directions you see here. Dose:  12.5 mg Take 0.5 Tabs by mouth two (2) times a day. Quantity:  1 Tab Refills:  0 CONTINUE these medications which have NOT CHANGED Dose & Instructions Dispensing Information Comments  
 albuterol 90 mcg/actuation inhaler Commonly known as:  PROVENTIL HFA, VENTOLIN HFA, PROAIR HFA Dose:  1 Puff Take 1 Puff by inhalation as needed for Wheezing. Refills:  0  
   
 allergy injection  
 by SubCUTAneous route. Does not know dosage Refills:  0  
   
 amoxicillin-clavulanate 875-125 mg per tablet Commonly known as:  AUGMENTIN Dose:  1 Tab Take 1 Tab by mouth every twelve (12) hours for 5 days. Quantity:  10 Tab Refills:  0  
   
 clopidogrel 75 mg Tab Commonly known as:  PLAVIX Dose:  75 mg Take 75 mg by mouth daily. Refills:  0  
   
 FISH OIL 1,000 mg Cap Generic drug:  omega-3 fatty acids-vitamin e Dose:  1 Cap Take 1 Cap by mouth. Dosage is 1200mg/600mg once daily Refills:  0  
   
 FLONASE NA Dose:  2 Spray 2 Sprays by Nasal route. Refills:  0 LIPITOR 40 mg tablet Generic drug:  atorvastatin Dose:  40 mg Take 40 mg by mouth daily. Refills:  0  
   
 loratadine 10 mg tablet Commonly known as:  Gaile Chimera Dose:  10 mg Take 10 mg by mouth daily. Refills:  0  
   
 losartan 25 mg tablet Commonly known as:  COZAAR Dose:  25 mg Take 1 Tab by mouth two (2) times a day. Quantity:  1 Tab Refills:  0  
   
 metoprolol tartrate 25 mg tablet Commonly known as:  LOPRESSOR Dose:  12.5 mg Take 0.5 Tabs by mouth every twelve (12) hours. Quantity:  1 Tab Refills:  0  
   
 multivitamin tablet Commonly known as:  ONE A DAY Dose:  1 Tab Take 1 Tab by mouth daily. Refills:  0  
   
 pantoprazole 40 mg tablet Commonly known as:  PROTONIX Dose:  40 mg Take 40 mg by mouth daily. Refills:  0  
   
 SYMBICORT 160-4.5 mcg/actuation Hfaa Generic drug:  budesonide-formoterol Dose:  2 Puff Take 2 Puffs by inhalation two (2) times a day. Refills:  0  
   
 tiotropium 18 mcg inhalation capsule Commonly known as:  Kate Ernesto Dose:  1 Cap Take 1 Cap by inhalation daily. Refills:  0  
   
 VITAMIN C 500 mg tablet Generic drug:  ascorbic acid (vitamin C) Dose:  800 mg Take 800 mg by mouth daily. Refills:  0 Follow-up Information Follow up With Details Comments Contact Info Magdalena [address] [place] [address] 
[phone] Lisa Molina MD   [address] [facility] [address] [facility] 
[phone] Patient prefers to schedule her own follow-up appointments. Discharge Instructions HEART CATHETERIZATION/ANGIOGRAPHY DISCHARGE INSTRUCTIONS 1. Check puncture site frequently for swelling or bleeding. If there is any bleeding, lie down and apply pressure over the area with a clean towel or washcloth. Notify your doctor for any redness, swelling, drainage, or oozing from the puncture site. Notify your doctor for any fever or chills. 2. If the extremity becomes cold, numb, or painful call Dr. Bautista Rossi office. 3. Activity should be limited for the next 48 hours. Climb stairs as little as possible and avoid any stooping, bending, or strenuous activity for 48 hours. No heavy lifting (anything over 10 pounds) for 3 days. 4. You may resume your usual diet. Drink more fluids than usual. 
5. Have a responsible person drive you home and stay with you for at least 24 hours after your heart catheterization/angiography. 6. You may remove bandage from your Left and Arm in 24 hours. You may shower in 24 hours. No tub baths, hot tubs, or swimming for 1 week. Do not place any lotions, creams, powders, or ointments over puncture site for 1 week. You may place a clean band-aid over the puncture site each day for 5 days. Change daily. I have read the above instructions and have had the opportunity to ask questions. Patient: ________________________   Date: 5/3/2018 Witness: _______________________   Date: 5/3/2018 DISCHARGE SUMMARY from Nurse PATIENT INSTRUCTIONS: 
 
 
F-face looks uneven A-arms unable to move or move unevenly S-speech slurred or non-existent T-time-call 911 as soon as signs and symptoms begin-DO NOT go Back to bed or wait to see if you get better-TIME IS BRAIN.  
 
Warning Signs of HEART ATTACK  
 
 Call 911 if you have these symptoms: 
? Chest discomfort. Most heart attacks involve discomfort in the center of the chest that lasts more than a few minutes, or that goes away and comes back. It can feel like uncomfortable pressure, squeezing, fullness, or pain. ? Discomfort in other areas of the upper body. Symptoms can include pain or discomfort in one or both arms, the back, neck, jaw, or stomach. ? Shortness of breath with or without chest discomfort. ? Other signs may include breaking out in a cold sweat, nausea, or lightheadedness. Don't wait more than five minutes to call [address]! Fast action can save your life. Calling 911 is almost always the fastest way to get lifesaving treatment. Emergency Medical Services staff can begin treatment when they arrive  up to an hour sooner than if someone gets to the hospital by car. The discharge information has been reviewed with the patient. The patient verbalized understanding. Discharge medications reviewed with the patient and appropriate educational materials and side effects teaching were provided. ___________________________________________________________________________________________________________________________________ Patient armband removed and shredded Chart Review Routing History Recipient Method Report Sent By Fady Wilson MD  
Fax: [phone] Phone: [phone] Fax IP Auto Routed US Cuco MD [[id number]] 5/2/2016  4:59 PM 05/02/2016 Tyler Boudreaux MD  
Phone: [phone] In [employer] Routed [facility]. MD Kd [[id number]] 5/8/2016  7:47 PM 05/08/2016 Yesica Cervantes MD  
Phone: [phone] In [employer] Routed [facility] MD Kd [[id number]] 5/8/2016  7:47 PM 05/08/2016 Nikki Wilson MD  
Fax: [phone] Phone: [phone] Fax IP Auto Routed [facility] MD Kd [[id number]] 5/8/2016  7:47 PM 05/08/2016  Nikki Wilson MD  
 Fax: [phone] Phone: [phone] Fax IP Auto Routed [address] MD [[id number]] 5/2/2018  6:56 PM 05/02/2018

## 2018-05-01 NOTE — IP AVS SNAPSHOT
[address] [place] [address] 
[phone] Patient: Rowan Bennett MRN: [id number] About your hospitalization You were admitted on:  May 1, 2018 You last received care in the:  [facility] You were discharged on: May 4, 2018 Why you were hospitalized Your primary diagnosis was:  Elevated Troponin Your diagnoses also included:  Copd (Chronic Obstructive Pulmonary Disease) (Hcc), Cad (Coronary Artery Disease), Nstemi (Non-St Elevated Myocardial Infarction) ([facility]) Follow-up Information Follow up With Details Comments Contact Info Elayne Martin, [address] [facility] 
[phone] Pete Saldivar MD   [address] [facility] [address] [facility] 
[phone] Patient prefers to schedule her own follow-up appointments. Discharge Orders None A check franky indicates which time of day the medication should be taken. My Medications START taking these medications Instructions Each Dose to Equal  
 Morning Noon Evening Bedtime  
 aspirin 81 mg chewable tablet Start taking on:  5/5/2018 Your last dose was: Your next dose is: Take 1 Tab by mouth daily. 81 mg  
    
   
   
   
  
 guaiFENesin  mg ER tablet Commonly known as:  [employer] Your last dose was: Your next dose is: Take 1 Tab by mouth every twelve (12) hours. 600 mg CHANGE how you take these medications Instructions Each Dose to Equal  
 Morning Noon Evening Bedtime  
 spironolactone 25 mg tablet Commonly known as:  ALDACTONE What changed:  Another medication with the same name was removed. Continue taking this medication, and follow the directions you see here. Your last dose was: Your next dose is: Take 0.5 Tabs by mouth two (2) times a day. 12.5 mg  
    
   
   
   
  
  
CONTINUE taking these medications Instructions Each Dose to Equal  
 Morning Noon Evening Bedtime  
 albuterol 90 mcg/actuation inhaler Commonly known as:  PROVENTIL HFA, VENTOLIN HFA, PROAIR HFA Your last dose was: Your next dose is: Take 1 Puff by inhalation as needed for Wheezing. 1 Puff  
    
   
   
   
  
 allergy injection Your last dose was: Your next dose is:    
   
   
 by SubCUTAneous route. Does not know dosage  
     
   
   
   
  
 amoxicillin-clavulanate 875-125 mg per tablet Commonly known as:  AUGMENTIN Your last dose was: Your next dose is: Take 1 Tab by mouth every twelve (12) hours for 5 days. 1 Tab  
    
   
   
   
  
 clopidogrel 75 mg Tab Commonly known as:  PLAVIX Your last dose was: Your next dose is: Take 75 mg by mouth daily. 75 mg FISH OIL 1,000 mg Cap Generic drug:  omega-3 fatty acids-vitamin e Your last dose was: Your next dose is: Take 1 Cap by mouth. Dosage is 1200mg/600mg once daily 1 Cap FLONASE NA Your last dose was: Your next dose is: 2 Sprays by Nasal route. 2 Spray LIPITOR 40 mg tablet Generic drug:  atorvastatin Your last dose was: Your next dose is: Take 40 mg by mouth daily. 40 mg  
    
   
   
   
  
 loratadine 10 mg tablet Commonly known as:  Maryjane Hinton Your last dose was: Your next dose is: Take 10 mg by mouth daily. 10 mg  
    
   
   
   
  
 losartan 25 mg tablet Commonly known as:  COZAAR Your last dose was: Your next dose is: Take 1 Tab by mouth two (2) times a day. 25 mg  
    
   
   
   
  
 metoprolol tartrate 25 mg tablet Commonly known as:  LOPRESSOR Your last dose was: Your next dose is: Take 0.5 Tabs by mouth every twelve (12) hours. 12.5 mg  
    
   
   
   
  
 multivitamin tablet Commonly known as:  ONE A DAY Your last dose was: Your next dose is: Take 1 Tab by mouth daily. 1 Tab  
    
   
   
   
  
 pantoprazole 40 mg tablet Commonly known as:  PROTONIX Your last dose was: Your next dose is: Take 40 mg by mouth daily. 40 mg  
    
   
   
   
  
 SYMBICORT 160-4.5 mcg/actuation Hfaa Generic drug:  budesonide-formoterol Your last dose was: Your next dose is: Take 2 Puffs by inhalation two (2) times a day. 2 Puff  
    
   
   
   
  
 tiotropium 18 mcg inhalation capsule Commonly known as:  Marivel Bye Your last dose was: Your next dose is: Take 1 Cap by inhalation daily. 1 Cap VITAMIN C 500 mg tablet Generic drug:  ascorbic acid (vitamin C) Your last dose was: Your next dose is: Take 800 mg by mouth daily. 800 mg Where to Get Your Medications These medications were sent to [address] [place] [address] Phone:  [phone]  
  aspirin 81 mg chewable tablet  
 guaiFENesin  mg ER tablet Information on where to get these meds will be given to you by the nurse or doctor. ! Ask your nurse or doctor about these medications  
  amoxicillin-clavulanate 875-125 mg per tablet  
 losartan 25 mg tablet  
 metoprolol tartrate 25 mg tablet  
 spironolactone 25 mg tablet Discharge Instructions HEART CATHETERIZATION/ANGIOGRAPHY DISCHARGE INSTRUCTIONS 1. Check puncture site frequently for swelling or bleeding.  If there is any bleeding, lie down and apply pressure over the area with a clean towel or washcloth. Notify your doctor for any redness, swelling, drainage, or oozing from the puncture site. Notify your doctor for any fever or chills. 2. If the extremity becomes cold, numb, or painful call Dr. Alisia Beyer office. 3. Activity should be limited for the next 48 hours. Climb stairs as little as possible and avoid any stooping, bending, or strenuous activity for 48 hours. No heavy lifting (anything over 10 pounds) for 3 days. 4. You may resume your usual diet. Drink more fluids than usual. 
5. Have a responsible person drive you home and stay with you for at least 24 hours after your heart catheterization/angiography. 6. You may remove bandage from your Left and Arm in 24 hours. You may shower in 24 hours. No tub baths, hot tubs, or swimming for 1 week. Do not place any lotions, creams, powders, or ointments over puncture site for 1 week. You may place a clean band-aid over the puncture site each day for 5 days. Change daily. I have read the above instructions and have had the opportunity to ask questions. Patient: ________________________   Date: 5/3/2018 Witness: _______________________   Date: 5/3/2018 DISCHARGE SUMMARY from Nurse PATIENT INSTRUCTIONS: 
 
 
F-face looks uneven A-arms unable to move or move unevenly S-speech slurred or non-existent T-time-call 911 as soon as signs and symptoms begin-DO NOT go Back to bed or wait to see if you get better-TIME IS BRAIN. Warning Signs of HEART ATTACK Call 911 if you have these symptoms: 
? Chest discomfort. Most heart attacks involve discomfort in the center of the chest that lasts more than a few minutes, or that goes away and comes back. It can feel like uncomfortable pressure, squeezing, fullness, or pain. ? Discomfort in other areas of the upper body. Symptoms can include pain or discomfort in one or both arms, the back, neck, jaw, or stomach. ? Shortness of breath with or without chest discomfort. ? Other signs may include breaking out in a cold sweat, nausea, or lightheadedness. Don't wait more than five minutes to call [address]! Fast action can save your life. Calling 911 is almost always the fastest way to get lifesaving treatment. Emergency Medical Services staff can begin treatment when they arrive  up to an hour sooner than if someone gets to the hospital by car. The discharge information has been reviewed with the patient. The patient verbalized understanding. Discharge medications reviewed with the patient and appropriate educational materials and side effects teaching were provided. ___________________________________________________________________________________________________________________________________ Patient armband removed and shredded MyChart Announcement We are excited to announce that we are making your provider's discharge notes available to you in [employer]. You will see these notes when they are completed and signed by the physician that discharged you from your recent hospital stay. If you have any questions or concerns about any information you see in [employer], please call the Health Information Department where you were seen or reach out to your Primary Care Provider for more information about your plan of care. Introducing [facility]! Gerda Valle introduces [employer] patient portal. Now you can access parts of your medical record, email your doctor's office, and request medication refills online. 1. In your internet browser, go to [email or web address] [email or web address]. [employer]. linkedÃ¼/[facility] 2. Click on the First Time User? Click Here link in the Sign In box. You will see the New Member Sign Up page. 3. Enter your [employer] Access Code exactly as it appears below. You will not need to use this code after youve completed the sign-up process. If you do not sign up before the expiration date, you must request a new code. · [employer] Access Code: [email or web address] Expires: 7/30/2018  2:07 PM 
 
4. Enter the last four digits of your Social Security Number (xxxx) and Date of Birth (mm/dd/yyyy) as indicated and click Submit. You will be taken to the next sign-up page. 5. Create a [employer] ID. This will be your [employer] login ID and cannot be changed, so think of one that is secure and easy to remember. 6. Create a [employer] password. You can change your password at any time. 7. Enter your Password Reset Question and Answer. This can be used at a later time if you forget your password. 8. Enter your e-mail address. You will receive e-mail notification when new information is available in [address] 9. Click Sign Up. You can now view and download portions of your medical record. 10. Click the Download Summary menu link to download a portable copy of your medical information. If you have questions, please visit the Frequently Asked Questions section of the [employer] website. Remember, [employer] is NOT to be used for urgent needs. For medical emergencies, dial 911. Now available from your iPhone and Android! Introducing Edgardo Florence As a [facility] patient, I wanted to make you aware of our electronic visit tool called Edgardo Alexkitty. [facility] 24/7 allows you to connect within minutes with a medical provider 24 hours a day, seven days a week via a mobile device or tablet or logging into a secure website from your computer. You can access Edgardo Alexkevfin from anywhere in the [place].  
 
A virtual visit might be right for you when you have a simple condition and feel like you just dont want to get out of bed, or cant get away from work for an appointment, when your regular [employer] provider is not available (evenings, weekends or holidays), or when youre out of town and need minor care. Electronic visits cost only $49 and if the [employer] 24/7 provider determines a prescription is needed to treat your condition, one can be electronically transmitted to a nearby pharmacy*. Please take a moment to enroll today if you have not already done so. The enrollment process is free and takes just a few minutes. To enroll, please download the [employer] 24/7 art to your tablet or phone, or visit [email or web address] to enroll on your computer. And, as an [address] [place] [address] patient with a Outcome Referrals account, the results of your visits will be scanned into your electronic medical record and your primary care provider will be able to view the scanned results. We urge you to continue to see your regular [employer] provider for your ongoing medical care. And while your primary care provider may not be the one available when you seek a [employer] virtual visit, the peace of mind you get from getting a real diagnosis real time can be priceless. For more information on bright box, view our Frequently Asked Questions (FAQs) at [email or web address]. Sincerely, 
 
Addie Ruiz MD 
Chief Medical Officer [address] [place] [address] *:  certain medications cannot be prescribed via bright box Providers Seen During Your Hospitalization Provider Specialty Primary office phone Veronica Richards MD Emergency Medicine [phone] Sana Quintana MD Emergency Medicine [phone] Adriane Alexander MD Family Practice [phone] Your Primary Care Physician (PCP) Primary Care Physician Office Phone Office Fax [facility], [address] [place] [address] [phone] You are allergic to the following Allergen Reactions Lexapro (Escitalopram) Rash Zoloft (Sertraline) Rash Recent Documentation Height Weight Breastfeeding? BMI OB Status Smoking Status 1.651 m 71.7 kg No 26.3 kg/m2 Hysterectomy Former Smoker Emergency Contacts Name Discharge Info Relation Home Work Mobile [facility] CAREGIVER [3] Child [2]   [phone] Patient Belongings The following personal items are in your possession at time of discharge: 
  Dental Appliances: None  Visual Aid: Glasses, With patient      Home Medications: Kept at bedside   Jewelry: Bracelet, Earrings, Ring, Necklace  Clothing: Pants, Undergarments, Footwear, At bedside    Other Valuables: Cell Phone, [employer] ($1 in wallet) Please provide this summary of care documentation to your next provider. Signatures-by signing, you are acknowledging that this After Visit Summary has been reviewed with you and you have received a copy. Patient Signature:  ____________________________________________________________ Date:  ____________________________________________________________  
  
Catarina Guthrie Provider Signature:  ____________________________________________________________ Date:  ____________________________________________________________

## 2018-05-01 NOTE — ED NOTES
Patient admin home medications at this time. Luzmaria MAY made aware. Patient aware of urine sample.  Will call for assistance

## 2018-05-01 NOTE — ED PROVIDER NOTES
EMERGENCY DEPARTMENT HISTORY AND PHYSICAL EXAM    Date: 5/1/2018  Patient Name: Romayne Dull    History of Presenting Illness     Chief Complaint   Patient presents with    Hypertension         History Provided By: Patient    Chief Complaint: Elevated BP reading  Duration: 1 Hours  Timing:  Acute  Modifying Factors: Pt states she did not take her medications this morning  Associated Symptoms: headache (resolved)    Additional History (Context):   2:06 PM  Romayne Dull is a 75 y.o. female with PMHX of HTN, COPD, CAD , MI (2 years ago) who presents to the emergency department via EMS C/O elevated BP reading onset PTA. Pt states she feels fine right now. Pt states she was hooked up to a halter monitor that started beeping, and her pulse was very high and her BP kept rising but her pulse ox was normal. Pt states she is on a halter monitor because she requested being able to be constantly monitored. Pt states she forgot to take her medications this morning. Associated sxs include headache (resolved). She is followed by Citlalli Oviedo MD for cardiology. Pt denies CP, anxiety, SOB and any other sxs or complaints.      PCP: Gwen Amezcua MD    Current Facility-Administered Medications   Medication Dose Route Frequency Provider Last Rate Last Dose    pantoprazole (PROTONIX) tablet 40 mg  40 mg Oral QHS Giuliana Rich MD   40 mg at 05/02/18 0022    spironolactone (ALDACTONE) tablet 25 mg  25 mg Oral QHS Giuliana Rich MD   12.5 mg at 05/02/18 0022    fluticasone (FLONASE) 50 mcg/actuation nasal spray 2 Spray  2 Spray Both Nostrils Q12H Giuliana Rich MD   2 [place] at 05/02/18 0901    enoxaparin (LOVENOX) injection 80 mg  80 mg SubCUTAneous Q12H Guiliana Rich MD   80 mg at 05/02/18 1315    fluticasone-vilanterol (BREO ELLIPTA) 100mcg-25mcg/puff  1 Puff Inhalation DAILY Giuliana Rich MD   1 Puff at 05/02/18 0901    clopidogrel (PLAVIX) tablet 75 mg  75 mg Oral DAILY Giuliana Rich MD   75 mg at 05/02/18 9826    metoprolol tartrate (LOPRESSOR) tablet 25 mg  25 mg Oral Q12H Lonnie Watson MD   25 mg at 05/02/18 0900    fish oil-omega-3 fatty acids 340-1,000 mg capsule 1 Cap  1 Cap Oral DAILY Lonnie Watson MD   1 Cap at 05/02/18 0858    umeclidinium (INCRUSE ELLIPTA) 62.5 mcg/actuation  1 Puff Inhalation DAILY Lonnie Watson MD   1 Puff at 05/02/18 0901    aspirin chewable tablet 81 mg  81 mg Oral DAILY Lonnie Watson MD   81 mg at 05/02/18 0900    ondansetron (ZOFRAN) injection 4 mg  4 mg IntraVENous Q6H PRN Lonnie Watson MD        acetaminophen (TYLENOL) tablet 650 mg  650 mg Oral Q6H PRN Lonnie Watson MD        nitroglycerin (NITROSTAT) tablet 0.4 mg  0.4 mg SubLINGual PRN Lonnie Watson MD        hydrALAZINE (APRESOLINE) 20 mg/mL injection 20 mg  20 mg IntraVENous Q6H PRN Lonnie Watson MD        amoxicillin-clavulanate (AUGMENTIN) 875-125 mg per tablet 1 Tab  1 Tab Oral BID WITH MEALS Lonnie Watson MD   1 Tab at 05/02/18 0857    losartan (COZAAR) tablet 25 mg  25 mg Oral BID Lnonie Watson MD   25 mg at 05/02/18 3035    atorvastatin (LIPITOR) tablet 40 mg  40 mg Oral DAILY Lonnie Watson MD   40 mg at 05/02/18 0021    multivitamin, tx-iron-ca-min (THERA-M w/ IRON) tablet 1 Tab  1 Tab Oral DAILY Lonnie Watson MD   1 Tab at 05/02/18 6384    ascorbic acid (vitamin C) (VITAMIN C) tablet 500 mg  500 mg Oral DAILY Lonnie Watson MD   500 mg at 05/02/18 9988    loratadine (CLARITIN) tablet 10 mg  10 mg Oral DAILY Lonnie Watson MD   10 mg at 05/02/18 8096       Past History     Past Medical History:  Past Medical History:   Diagnosis Date    Asthma     CAD (coronary artery disease)     MI    [facility]     Cataract     LEFT and RIGHT    Chronic obstructive pulmonary disease ([facility]     Diverticulitis     Hypertension     Melanoma ([address]        Past Surgical History:  Past Surgical History:   Procedure Laterality Date    CARDIAC SURG PROCEDURE UNLIST      04/16 Stent    HX GYN      HX HYSTERECTOMY      HX ORTHOPAEDIC      cervical, lumbar       Family History:  History reviewed. No pertinent family history. Social History:  Social History   Substance Use Topics    Smoking status: Former Smoker    Smokeless tobacco: Never Used    Alcohol use No       Allergies: Allergies   Allergen Reactions    Lexapro [Escitalopram] Rash    Zoloft [Sertraline] Rash         Review of Systems   Review of Systems   Constitutional:  Denies malaise, fever, chills. Head:  Denies injury. Face:  Denies injury or pain. ENMT:  Denies sore throat. Neck:  Denies injury or pain. Chest:  Denies injury. Cardiac:  Denies chest pain or palpitations. Respiratory:  Denies cough, wheezing, difficulty breathing, shortness of breath. GI/ABD:  Denies injury, pain, distention, nausea, vomiting, diarrhea. :  Denies injury, pain, dysuria or urgency. Back:  Denies injury or pain. Pelvis:  Denies injury or pain. Extremity/MS:  Denies injury or pain. Neuro:  Headache (resolved) Denies headache, LOC, dizziness, neurologic symptoms/deficits/paresthesias. Skin: Denies injury, rash, itching or skin changes. Psych: Denies anxiety      Physical Exam     Vitals:    05/02/18 0010 05/02/18 0303 05/02/18 0653 05/02/18 1015   BP: 144/63 141/61 100/47 139/84   Pulse: 98 92 81 63   Resp: 17 17 17 17   Temp: 98.1 °F (36.7 °C) 98.2 °F (36.8 °C) 97.9 °F (36.6 °C) 98.8 °F (37.1 °C)   SpO2: 98% 99% 100% 100%   Weight:  78.2 kg (172 lb 6.4 oz)     Height:         Physical Exam   Nursing note and vitals reviewed. CONSTITUTIONAL: Alert, in no apparent distress; well-developed; well-nourished. HEAD:  Normocephalic, atraumatic. EYES: PERRL; EOM's intact. ENTM: Nose: No rhinorrhea; Throat: mucous membranes moist. Posterior pharynx-normal.  Neck:  No JVD, supple without lymphadenopathy. RESP: Chest clear, equal breath sounds. CV: S1 and S2 WNL; No murmurs, gallops or rubs. GI: Abdomen soft and non-tender. No masses or organomegaly. UPPER EXT:  Normal inspection. LOWER EXT: Normal inspection. NEURO: strength 5/5 and sym, sensation intact. SKIN: No rashes; Normal for age and stage. PSYCH:  Alert and oriented, normal affect. Diagnostic Study Results     Labs -     Recent Results (from the past 12 hour(s))   CARDIAC PANEL,(CK, CKMB & TROPONIN)    Collection Time: 05/02/18  4:05 AM   Result Value Ref Range    CK 70 26 - 192 U/L    CK - MB 1.8 <3.6 ng/ml    CK-MB Index 2.6 0.0 - 4.0 %    Troponin-I, Qt. 0.29 (H) 0.00 - 0.06 NG/ML       Radiologic Studies -   XR CHEST PORT   Final Result        CT Results  (Last 48 hours)    None        CXR Results  (Last 48 hours)               05/01/18 1444  XR CHEST PORT Final result    Impression:  IMPRESSION:       No acute pulmonary process identified. Narrative:  EXAM: One-view chest       CLINICAL HISTORY: Hypertension ,       COMPARISON: None       FINDINGS:       Frontal view of the chest demonstrate clear lungs. Cardiac silhouette is normal   in size and contour. No acute bony or soft tissue abnormality.                  Medications given in the ED-  Medications   enoxaparin (LOVENOX) injection 80 mg (80 mg SubCUTAneous Given 5/2/18 1315)   fluticasone-vilanterol (BREO ELLIPTA) 100mcg-25mcg/puff (1 Puff Inhalation Given 5/2/18 0901)   clopidogrel (PLAVIX) tablet 75 mg (75 mg Oral Given 5/2/18 0859)   metoprolol tartrate (LOPRESSOR) tablet 25 mg (25 mg Oral Given 5/2/18 0900)   fish oil-omega-3 fatty acids 340-1,000 mg capsule 1 Cap (1 Cap Oral Given 5/2/18 0858)   umeclidinium (INCRUSE ELLIPTA) 62.5 mcg/actuation (1 Puff Inhalation Given 5/2/18 0901)   aspirin chewable tablet 81 mg (81 mg Oral Given 5/2/18 0900)   ondansetron (ZOFRAN) injection 4 mg (not administered)   acetaminophen (TYLENOL) tablet 650 mg (not administered)   nitroglycerin (NITROSTAT) tablet 0.4 mg (not administered)   hydrALAZINE (APRESOLINE) 20 mg/mL injection 20 mg (not administered)   amoxicillin-clavulanate (AUGMENTIN) 875-125 mg per tablet 1 Tab (1 Tab Oral Given 5/2/18 0857)   losartan (COZAAR) tablet 25 mg (25 mg Oral Given 5/2/18 0859)   atorvastatin (LIPITOR) tablet 40 mg (40 mg Oral Given 5/2/18 0021)   multivitamin, tx-iron-ca-min (THERA-M w/ IRON) tablet 1 Tab (1 Tab Oral Given 5/2/18 0858)   ascorbic acid (vitamin C) (VITAMIN C) tablet 500 mg (500 mg Oral Given 5/2/18 0859)   loratadine (CLARITIN) tablet 10 mg (10 mg Oral Given 5/2/18 0859)   pantoprazole (PROTONIX) tablet 40 mg (40 mg Oral Given 5/2/18 0022)   spironolactone (ALDACTONE) tablet 25 mg (12.5 mg Oral Given 5/2/18 0022)   fluticasone (FLONASE) 50 mcg/actuation nasal spray 2 Spray (2 Sprays Both Nostrils Given 5/2/18 0901)   sodium chloride 0.9 % bolus infusion 1,000 mL (0 mL IntraVENous IV Completed 5/1/18 1849)   enoxaparin (LOVENOX) injection 80 mg (80 mg SubCUTAneous Given 5/1/18 2142)         Medical Decision Making   I am the first provider for this patient. I reviewed the vital signs, available nursing notes, past medical history, past surgical history, family history and social history. Vital Signs-Reviewed the patient's vital signs.     Pulse Oximetry Analysis - 100% on 2 L Oxygen     Cardiac Monitor:  Rate: 107 bpm  Rhythm: Sinus Tachycardia    EKG interpretation: (Preliminary)  3:43 PM   92 BPM, NSR, no STEMI  EKG read by LALO Bush at 3:53     EKG interpretation: (Secondary)  5:41 PM   87 BPM, NSR, no STEMI  EKG read by LALO Bush at 5:41 PM    EKG interpretation: (Third)  8:13 PM   94 BPM, NSR, no STEMI  EKG read by Chacha Tomas MD at 8:35 PM      Records Reviewed: Nursing Notes, Old Medical Records, Previous electrocardiograms, Ambulance Run Sheet, Previous Radiology Studies and Previous Laboratory Studies    Provider Notes (Medical Decision Making):   DDx:  viral vs bacterial URI, asthma exacerbation, COPD, bronchitis, PNE, PE, allergies, pneumothorax, atypical CP, costochondritis/chest wall pain, HF, MI, TAA/AAA, pericarditis, trauma (cardiac contusion, rib Fx, etc), pleuritic chest pain, pleural effusion, intraabdominal process    Impression:  Upward trending troponin concerning for NSTEMI in a patient with non Coronary artery disease s/p stent placement 2 years ago. Currently asymptomatic,  Cards to follow, they would like her to have one dose of lovenox now. Hospitalist, Dr. Kenneth Robles to admit. Billy Canales      Procedures:  Procedures    ED Course:   2:06 PM   Initial assessment performed. The patients presenting problems have been discussed, and they are in agreement with the care plan formulated and outlined with them. I have encouraged them to ask questions as they arise throughout their visit. 3:50 PM  Pt states she feel completely fine and has no complaints at this time. 5:27 PM  Second troponin has risen since original but not a positive number. Will repeat EKG, repeat cardiac enzymes in another 3 hours. CONSULT NOTE:   5:29 PM  LALO Bush spoke with Agata Mota MD   Specialty: Emergency Medicine  Discussed pt's hx, disposition, and available diagnostic and imaging results  in person. Reviewed care plans. Consulting physician agrees with plans as outlined. He recommends consulting cardiology. CONSULT NOTE:   5:34 PM  LALO Bush spoke with Modesta Soriano MD   Specialty: Cardiology  Discussed pt's hx, disposition, and available diagnostic and imaging results  over the telephone. Reviewed care plans. Consulting physician agrees with plans as outlined. He agrees with the plan to repeat cardiac panels in 3 hours. CONSULT NOTE:   9:09 PM  LALO Bush spoke with Modesta Soriano MD   Specialty: Cardiology  Discussed pt's hx, disposition, and available diagnostic and imaging results  over the telephone. Reviewed care plans. Consulting physician agrees with plans as outlined. He recommends admitting to hospitalist and giving one dose of Lopinox.  He recommends she stay on her home medications. SIGN OUT:  8:56 PM  Patient's presentation, labs/imaging and plan of care was reviewed with Aurea Santana PA-C as part of sign out. They will get patient admitted as part of the plan discussed with the patient. Aurea Santana PA-C's assistance in completion of this plan is greatly appreciated but it should be noted that I will be the provider of record for this patient. 9:22 PM Discussed patient's history, exam, and available diagnostics results with Pj Guzman MD, emergency physician, who agrees with admission. 9:39 PM Discussed patient's history, exam, and available diagnostics results with Michelle Mesa MD, hospitalist, who accepts admission. Diagnosis and Disposition       Core Measures:  For Hospitalized Patients:    1. Hospitalization Decision Time:  The decision to hospitalize the patient was made by LALO Charles at 9:18 PM on 5/1/2018    2. Aspirin: Aspirin was given    8:59 PM  Patient is being admitted to the hospital by Michelle Mesa MD, hospitalist. The results of their tests and reasons for their admission have been discussed with them and/or available family. They convey agreement and understanding for the need to be admitted and for their admission diagnosis. CONDITIONS ON ADMISSION:  Sepsis is not present at the time of admission. Deep Vein Thrombosis is not present at the time of admission. Thrombosis is not present at the time of admission. Urinary Tract Infection is not present at the time of admission. Pneumonia is not present at the time of admission. MRSA is not present at the time of admission. Wound infection is not present at the time of admission. Pressure Ulcer is not present at the time of admission. CLINICAL IMPRESSION:    1. Elevated troponin    2. NSTEMI (non-ST elevated myocardial infarction) ([facility] [address]        PLAN:  1. Admit to telemetry  _______________________________    Attestations:   This note is prepared by Maria Alejandra Sapp, acting as Scribe for LALO Ríos. LALO Ríos:  The scribe's documentation has been prepared under my direction and personally reviewed by me in its entirety. I confirm that the note above accurately reflects all work, treatment, procedures, and medical decision making performed by me. Attestations: This note is prepared by Michelle Edmondson. Sheldonann marie Hernández, acting as Scribe for [employer]. EJ Whittington:  The scribe's documentation has been prepared under my direction and personally reviewed by me in its entirety.   I confirm that the note above accurately reflects all work, treatment, procedures, and medical decision making performed by me.  _______________________________

## 2018-05-02 LAB
ATRIAL RATE: 94 BPM
CALCULATED P AXIS, ECG09: 76 DEGREES
CALCULATED R AXIS, ECG10: 74 DEGREES
CALCULATED T AXIS, ECG11: 66 DEGREES
CK MB CFR SERPL CALC: 2.6 % (ref 0–4)
CK MB SERPL-MCNC: 1.8 NG/ML (ref 5–25)
CK SERPL-CCNC: 70 U/L (ref 26–192)
DIAGNOSIS, 93000: NORMAL
P-R INTERVAL, ECG05: 168 MS
Q-T INTERVAL, ECG07: 360 MS
QRS DURATION, ECG06: 88 MS
QTC CALCULATION (BEZET), ECG08: 450 MS
TROPONIN I SERPL-MCNC: 0.29 NG/ML (ref 0–0.06)
VENTRICULAR RATE, ECG03: 94 BPM

## 2018-05-02 PROCEDURE — 82550 ASSAY OF CK (CPK): CPT | Performed by: HOSPITALIST

## 2018-05-02 PROCEDURE — 93306 TTE W/DOPPLER COMPLETE: CPT

## 2018-05-02 PROCEDURE — 74011250636 HC RX REV CODE- 250/636: Performed by: HOSPITALIST

## 2018-05-02 PROCEDURE — 36415 COLL VENOUS BLD VENIPUNCTURE: CPT | Performed by: HOSPITALIST

## 2018-05-02 PROCEDURE — 65660000000 HC RM CCU STEPDOWN

## 2018-05-02 PROCEDURE — 77010033678 HC OXYGEN DAILY

## 2018-05-02 PROCEDURE — 74011250637 HC RX REV CODE- 250/637: Performed by: HOSPITALIST

## 2018-05-02 RX ORDER — SPIRONOLACTONE 25 MG/1
25 TABLET ORAL
Status: DISCONTINUED | OUTPATIENT
Start: 2018-05-02 | End: 2018-05-04

## 2018-05-02 RX ORDER — FLUTICASONE PROPIONATE 50 MCG
2 SPRAY, SUSPENSION (ML) NASAL EVERY 12 HOURS
Status: DISCONTINUED | OUTPATIENT
Start: 2018-05-02 | End: 2018-05-04 | Stop reason: HOSPADM

## 2018-05-02 RX ORDER — PANTOPRAZOLE SODIUM 40 MG/1
40 TABLET, DELAYED RELEASE ORAL
Status: DISCONTINUED | OUTPATIENT
Start: 2018-05-02 | End: 2018-05-04 | Stop reason: HOSPADM

## 2018-05-02 RX ADMIN — AMOXICILLIN AND CLAVULANATE POTASSIUM 1 TABLET: 875; 125 TABLET, FILM COATED ORAL at 17:57

## 2018-05-02 RX ADMIN — Medication 500 MG: at 08:59

## 2018-05-02 RX ADMIN — Medication 1 CAPSULE: at 08:58

## 2018-05-02 RX ADMIN — FLUTICASONE PROPIONATE 2 SPRAY: 50 SPRAY, METERED NASAL at 00:27

## 2018-05-02 RX ADMIN — SPIRONOLACTONE 12.5 MG: 25 TABLET ORAL at 22:35

## 2018-05-02 RX ADMIN — AMOXICILLIN AND CLAVULANATE POTASSIUM 1 TABLET: 875; 125 TABLET, FILM COATED ORAL at 00:23

## 2018-05-02 RX ADMIN — PANTOPRAZOLE SODIUM 40 MG: 40 TABLET, DELAYED RELEASE ORAL at 22:35

## 2018-05-02 RX ADMIN — SPIRONOLACTONE 12.5 MG: 25 TABLET ORAL at 00:22

## 2018-05-02 RX ADMIN — MULTIPLE VITAMINS W/ MINERALS TAB 1 TABLET: TAB at 08:58

## 2018-05-02 RX ADMIN — ENOXAPARIN SODIUM 80 MG: 80 INJECTION SUBCUTANEOUS at 13:15

## 2018-05-02 RX ADMIN — CLOPIDOGREL BISULFATE 75 MG: 75 TABLET ORAL at 08:59

## 2018-05-02 RX ADMIN — METOPROLOL TARTRATE 12.5 MG: 25 TABLET ORAL at 22:34

## 2018-05-02 RX ADMIN — METOPROLOL TARTRATE 25 MG: 25 TABLET ORAL at 09:00

## 2018-05-02 RX ADMIN — FLUTICASONE PROPIONATE 2 SPRAY: 50 SPRAY, METERED NASAL at 09:01

## 2018-05-02 RX ADMIN — PANTOPRAZOLE SODIUM 40 MG: 40 TABLET, DELAYED RELEASE ORAL at 00:22

## 2018-05-02 RX ADMIN — ATORVASTATIN CALCIUM 40 MG: 20 TABLET, FILM COATED ORAL at 00:21

## 2018-05-02 RX ADMIN — METOPROLOL TARTRATE 12.5 MG: 25 TABLET ORAL at 00:22

## 2018-05-02 RX ADMIN — LOSARTAN POTASSIUM 25 MG: 25 TABLET ORAL at 22:34

## 2018-05-02 RX ADMIN — ASPIRIN 81 MG 81 MG: 81 TABLET ORAL at 09:00

## 2018-05-02 RX ADMIN — AMOXICILLIN AND CLAVULANATE POTASSIUM 1 TABLET: 875; 125 TABLET, FILM COATED ORAL at 08:57

## 2018-05-02 RX ADMIN — UMECLIDINIUM 1 PUFF: 62.5 AEROSOL, POWDER ORAL at 09:01

## 2018-05-02 RX ADMIN — FLUTICASONE FUROATE AND VILANTEROL TRIFENATATE 1 PUFF: 100; 25 POWDER RESPIRATORY (INHALATION) at 09:01

## 2018-05-02 RX ADMIN — FLUTICASONE PROPIONATE 2 SPRAY: 50 SPRAY, METERED NASAL at 22:44

## 2018-05-02 RX ADMIN — LOSARTAN POTASSIUM 25 MG: 25 TABLET ORAL at 00:23

## 2018-05-02 RX ADMIN — LORATADINE 10 MG: 10 TABLET ORAL at 08:59

## 2018-05-02 RX ADMIN — LOSARTAN POTASSIUM 25 MG: 25 TABLET ORAL at 08:59

## 2018-05-02 RX ADMIN — ENOXAPARIN SODIUM 80 MG: 80 INJECTION SUBCUTANEOUS at 22:35

## 2018-05-02 NOTE — ROUTINE PROCESS
Bedside and Verbal shift change report given to Whitney Perkins RN  (oncoming nurse) by Donalee Lundborg, RN  (offgoing nurse). Report given with SBAR, Kardex, Intake/Output and Recent Results.

## 2018-05-02 NOTE — PROGRESS NOTES
2305 Patient arrived via stretcher from the ED to 342. Pt is alert, oriented and denies in pain, sob or discomfort of any kind. Oriented patient to room, call bell and unit routine. Marek Britt and spoke to Dr Daniel Walker regarding changes to the patient's PTA medication list. Orders updated. Home medications sent to pharmacy via McNairnguyen Peralta, pharmacy tech. 0704 Spoke to Ian Last in the pharmacy. Per hospital policy we are not allowed to use patient supplied medications. The patient's home medications will be sent back to the unit.     0021 Administered evening medications. Pt only took a half tablet of metoprolol and aldactone. The medication list was thoroughly updated based on the pill bottles and the patient's verbal record. All home medications were returned. [id number] Shift Summary: Pt rested well overnight with no complaints. No new clinical concerns noted.

## 2018-05-02 NOTE — ED NOTES
TRANSFER - OUT REPORT:    Verbal report given to Tomma Bernheim (name) on Damian Primes  being transferred to [employer] (unit) for routine progression of care       Report consisted of patients Situation, Background, Assessment and   Recommendations(SBAR). Information from the following report(s) SBAR, ED Summary, MAR, Recent Results and Cardiac Rhythm NSR was reviewed with the receiving nurse. Lines:   Peripheral IV 05/01/18 Left Antecubital (Active)   Site Assessment Clean, dry, & intact 5/1/2018  2:22 PM   Phlebitis Assessment 0 5/1/2018  2:22 PM   Infiltration Assessment 0 5/1/2018  2:22 PM   Dressing Status Clean, dry, & intact 5/1/2018  2:22 PM   Dressing Type Transparent 5/1/2018  2:22 PM   Hub Color/Line Status Pink;Flushed 5/1/2018  2:22 PM   Action Taken Blood drawn 5/1/2018  2:22 PM        Opportunity for questions and clarification was provided.       Patient transported with:   Monitor  Registered Nurse

## 2018-05-02 NOTE — PROGRESS NOTES
Reason for Admission:   Claude Mcgregor is a 75 y.o. female with PMHX of HTN, COPD, CAD , MI (2 years ago) who presents to the emergency department via EMS C/O elevated BP reading onset PTA. Pt states she feels fine right now. Pt states she was hooked up to a halter monitor that started beeping, and her pulse was very high and her BP kept rising but her pulse ox was normal. Pt states she is on a halter monitor because she requested being able to be constantly monitored. Pt states she forgot to take her medications this morning. Associated sxs include headache (resolved). She is followed by Ellis Pak MD for cardiology. Pt denies CP, anxiety, SOB and any other sxs or complaints.                       RRAT Score:     16             Do you (patient/family) have any concerns for transition/discharge? Patient states her daughter lives with her               Plan for utilizing home health:     tbd    Likelihood of readmission?   tbd            Transition of Care Plan:   Met with patient at bedside she informed cm she lives with her daughter states she is independent. States she has home o2 and has concentrator and portable no other needs or dme per patient.  Cm will follow as to needs for safe transition

## 2018-05-02 NOTE — PROGRESS NOTES
Hospitalist Progress Note    Patient: Nia Huynh MRN: [id number]  CSN: [id number]    YOB: 1945  Age: 75 y.o. Sex: female    DOA: 5/1/2018 LOS:  LOS: 1 day          Chief Complaint:    HTN, Elevated HR, Elevated Troponin    Assessment/Plan     1. Elevated Troponin  2. Hx of CAD  3. HTN  4. COPD on Chronic Home O2 Use  5. GERD    1. Echocardiogram has been completed and will await reading by cardiologist. Will await further workup as recommended by cardiology. For now, continue Lovenox at therapeutic dose, aspirin, plavix, lipitor. Denies any chest pain at this time. 2. As above, continue asa and plavix. 3. BP under better control, continue her usual home medications and prn hydralazine if needed. 4. Continue supplemental oxygen use, as well as her usual inhalers. Continue Augmentin for URI. 5. Continue Protonix. CDMP - Chronic Respiratory Failure due to COPD with 24 hour home oxygen use. DVT Prophylaxis - Lovenox  Dispo: Pending further cardiology workup, if necessary. Patient Active Problem List   Diagnosis Code    ST elevation (STEMI) myocardial infarction involving right coronary artery ([facility]) I21.11    COPD (chronic obstructive pulmonary disease) ([facility]) J44.9    Benign essential HTN I10    Dyslipidemia, goal LDL below 70 E78.5    Elevated troponin R74.8    CAD (coronary artery disease) I25.10       Subjective:    Denies chest pain. No concerns or complaints. Anxious over timing of echo/cardiologist evaluation.      Review of systems:    Constitutional: denies fevers, chills, myalgias  Respiratory: denies SOB, cough  Cardiovascular: denies chest pain, palpitations  Gastrointestinal: denies nausea, vomiting, diarrhea      Vital signs/Intake and Output:  Visit Vitals    /84 (BP 1 Location: Left arm, BP Patient Position: Supine)    Pulse 63    Temp 98.8 °F (37.1 °C)    Resp 17    Ht 5' 5\" (1.651 m)    Wt 78.2 kg (172 lb 6.4 oz)    SpO2 100%    Breastfeeding No    BMI 28.69 kg/m2     Current Shift:  05/02 0701 - 05/02 1900  In: 480 [P.O.:480]  Out: -   Last three shifts:  04/30 1901 - 05/02 0700  In: -   Out: 1200 [Urine:1200]    Exam:    General: Well developed, alert, NAD, OX3  Head/Neck: NCAT, supple, No masses, No lymphadenopathy  CVS:Regular rate and rhythm, no M/R/G, S1/S2 heard, no thrill  Lungs:Clear to auscultation bilaterally, no wheezes, rhonchi, or rales  Abdomen: Soft, Nontender, No distention, Normal Bowel sounds, No hepatomegaly  Extremities: No C/C/E, pulses palpable 2+  Skin:normal texture and turgor, no rashes, no lesions  Neuro:grossly normal , follows commands  Psych:appropriate                Labs: Results:       Chemistry Recent Labs      05/01/18   2320  05/01/18   1418   GLU  125*  100*   NA  144  144   K  3.8  3.8   CL  106  104   CO2  26  31   BUN  14  15   CREA  0.73  0.77   CA  8.7  8.7   AGAP  12  9   BUCR  19  19   AP   --   68   TP   --   7.0   ALB   --   4.0   GLOB   --   3.0   AGRAT   --   1.3      CBC w/Diff Recent Labs      05/01/18   2320  05/01/18   1418   WBC  7.0  6.1   RBC  3.79*  3.72*   HGB  11.3*  11.1*   HCT  34.8*  34.4*   PLT  248  228   GRANS   --   73   LYMPH   --   17*   EOS   --   1      Cardiac Enzymes Recent Labs      05/02/18   0405  05/01/18   2320   CPK  70  77   CKND1  2.6  2.5      Coagulation No results for input(s): PTP, INR, APTT in the last 72 hours. No lab exists for component: INREXT    Lipid Panel Lab Results   Component Value Date/Time    Cholesterol, total 148 04/21/2016 05:00 AM    HDL Cholesterol 53 04/21/2016 05:00 AM    LDL, calculated 75.6 04/21/2016 05:00 AM    VLDL, calculated 19.4 04/21/2016 05:00 AM    Triglyceride 97 04/21/2016 05:00 AM    CHOL/HDL Ratio 2.8 04/21/2016 05:00 AM      BNP No results for input(s): BNPP in the last 72 hours.    Liver Enzymes Recent Labs      05/01/18   1418   TP  7.0   ALB  4.0   AP  68   SGOT  17      Thyroid Studies No results found for: T4, T3U, TSH, TSHEXT Procedures/imaging: see electronic medical records for all procedures/Xrays and details which were not copied into this note but were reviewed prior to creation of 6150 Anita Sierra, PA-C

## 2018-05-02 NOTE — CDMP QUERY
Please clarify if this patient is being treated/managed for:    =>Chronic Respiratory Failure due to COPD with 24-hour oxygen use  =>Other Explanation of clinical findings  =>Unable to Determine (no explanation of clinical findings)    The medical record reflects the following:    Risk:Chronically ill elderly patient    Clinical Indicators:H&P states that pt uses oxygen 24 hours a day due to COPD    Thank you,  Melanie Bergeron RN [facility]  [phone]

## 2018-05-02 NOTE — PROGRESS NOTES
Problem: Falls - Risk of  Goal: *Absence of Falls  Document Devendra Fall Risk and appropriate interventions in the flowsheet.    Outcome: Progressing Towards Goal  Fall Risk Interventions:

## 2018-05-02 NOTE — PROGRESS NOTES
0730 Assumed responsibility for patient from [facility] AT [facility], [place], 30 [facility] morning meds given and assessment performed    Patient had uneventful shift. Echo and EKG performed. Dr. Nereida Adams consulted and patient seen with attempt to have cardiac cath tomorrow. Patient currently resting quietly with call bell within reach and in no pain    Bedside shift change report given to Charu Melgoza RN (oncoming nurse) by Peewee Zabala RN (offgoing nurse). Report included the following information SBAR, Kardex and MAR.

## 2018-05-02 NOTE — ROUTINE PROCESS
Bedside and Verbal shift change report given to Marlon JOSE L Hassan (oncoming nurse) by Lauro Reveles RN (offgoing nurse). Report included the following information SBAR, Kardex, ED Summary, Intake/Output, MAR, Recent Results and Cardiac Rhythm .      Patient Vitals for the past 12 hrs:   Temp Pulse Resp BP SpO2   05/02/18 1517 - - - 102/59 -   05/02/18 1515 98.6 °F (37 °C) 82 14 98/47 99 %   05/02/18 1015 98.8 °F (37.1 °C) 63 17 139/84 100 %

## 2018-05-02 NOTE — PROGRESS NOTES
NUTRITION SCREENING    Recommendations: Continue w/ POC    RD ASSESSMENT/PLAN:     Diet:  AHA Height: 5' 5\" (165.1 cm)     Food Allergies: NKFA Weight: 78.2 kg (172 lb 6.4 oz)    PO Intake:  No data found. BMI: 28.7 kg/m^2 is  overweight (25.0%-29.9% BMI)      PMH: Awaiting MD note    Current Hospital Problems: Awaiting MD note;      Pt has had no wt loss since November 2017. Awaiting MD note at this time Nutrition intervention not currently indicated. Pt is not at nutritional risk at this time. Will rescreen per policy.      REASON FOR ASSESSMENT:   []  RN Referral:    [x] MST score >/=2  Malnutrition Screening Tool (MST):  Recently Lost Weight Without Trying: No  If Yes, How Much Weight Loss: Unsure  Eating Poorly Due to Decreased Appetite: No  MST Score: Jena Patiño 116, RD  Pager: [phone]

## 2018-05-02 NOTE — H&P
[facility]  HIST[facility]YS[facility]      Christina Amos  MR#: [id number]  : 1945  ACCOUNT #: [de-identified]   ADMIT DATE: 2018    REASON FOR ADMISSION:  1. Elevated troponin. 2.  Uncontrolled hypertension. 3.  Tachycardia. 4.  History of coronary disease. 5.  Chronic obstructive pulmonary disease, on chronic oxygen. HISTORY OF PRESENT ILLNESS:  This is a 60-year-old lady who is followed by Dr. Yovana Stevenson as an outpatient. She has coronary disease and she had a stent placed after having had an MI in 2016. She also has COPD and is chronically on 24-hour oxygen. She has hypertension. She recently started taking Augmentin and Flonase 2 days ago for a cold. Prior to that for about 3 or 4 days she has been taking Yvonne-Avon Plus over-the-counter for cold symptoms. She is hooked up to a Holter monitor that she has been on for about 14 days now, and this morning, the monitor notified her that her pulse was running high. At that point, she checked her own blood pressure and it was elevated, and it kept rising into the 758 systolic range, which is unusual for her. She had a brief moment of dizziness earlier in the morning, but that resolved quickly. She denies any episodes of chest pain, shortness of breath or syncopal symptoms. Her heart rate had been elevated, apparently on the monitor, at least 3 episodes this morning, and again elevated blood pressures, so she was advised to come to the emergency room. She received a dose of Lovenox here, and she is being admitted by the hospitalist service per the recommendation of Cardiology. PAST MEDICAL HISTORY:  Includes coronary disease, status post a stent, melanoma of the left shoulder with lymph node dissection as well, COPD on chronic oxygen therapy, history of hypertension, diverticulitis and cataracts. PREVIOUS SURGERIES:  Include melanoma excision, cardiac stent, cervical and lumbar surgery and hysterectomy.     FAMILY HISTORY: No lung disease or coronary disease. SOCIAL HISTORY:  Quit smoking in 1999, smoked cigarettes for 35 years. No alcohol use. Lives at home with her  and her cats. Not working, at this point she is retired. ALLERGIES:  LEXAPRO AND  ZOLOFT. I removed the allergy to Augmentin, as she only has had diarrhea with that in the past and she is currently tolerating that for an upper respiratory infection. REVIEW OF SYSTEMS:   CONSTITUTIONAL:  She denies fevers, chills or malaise. HEAD, EAR, NOSE AND THROAT:  She has had upper respiratory infection symptoms, including stuffy nose, productive cough with green sputum and sinus congestion over the past week. NECK:  She denies any stiffness or pain there. RESPIRATORY:  Upper respiratory infection symptoms as noted above, but no wheezing or shortness of breath. GASTROINTESTINAL:  She has had no nausea, vomiting or diarrhea. GENITOURINARY:  No difficulty urinating, dysuria or hematuria. MUSCULOSKELETAL:  No new myalgias or arthralgias. HOME MEDICATIONS:  Include Augmentin, Plavix, HCTZ, Cozaar, Lopressor, Protonix, Zocor, Aldactone, Spiriva and albuterol inhaler, and again she is on oxygen 24/7. PHYSICAL EXAMINATION:  GENERAL:  She is well developed 76-year-old white female on oxygen, but appears in good spirits, no acute distress, resting comfortably in the bed reading her book. VITAL SIGNS:  Temp is 99.4, pulse 92, blood pressure 144/62, respiratory rate is 18, SaO2 is 98% on nasal cannula 2 L. HEENT:  Her oropharynx is clear. Mucous membranes are moist.  Sclerae anicteric. Conjunctivae pink. NECK:  Supple. No thyromegaly or lymphadenopathy. LUNGS:  Clear bilaterally. No rales, rhonchi or wheezes. CARDIAC:  Regular rate and rhythm. No murmur, rub or gallop. ABDOMEN:  Soft, nontender, no distention. Normal bowel sounds. LOWER EXTREMITIES:  No clubbing, cyanosis or edema. Distal pulses are palpable.     LABORATORY DATA:  Her CBC is really unremarkable, hemoglobin slightly low at 11.1. Troponin initially was 0.04, then it was 0.17. Third set of cardiac markers is normal.  Her CK and CK-MB index are within normal range. Her metabolic profile was also normal, as were her LFTs. DIAGNOSTIC DATA:  She had a chest x-ray that showed no acute pulmonary process. EKG serially that all revealed normal sinus rhythm at this point with no acute ST abnormalities. Previous echo was done 2016, the results from that showed an EF of 50%. ASSESSMENT:  1. Elevated troponin, associated with tachycardia earlier today and dizziness. 2.  History of coronary disease. 3.  History of hypertension. 4.  Chronic obstructive pulmonary disease, on home oxygen. 5.  Gastroesophageal reflux disease. PLAN:  Admission to telemetry. Continue her home medications. Monitor her blood pressure closely. Hydralazine as needed for elevated blood pressure greater than 954 systolic or 393 diastolic. Nitrostat tablets as needed for any chest pain. Lovenox was administered full dose in the emergency room, we will continue that twice daily. Cardiology has been consulted from the ER, they will follow her as well. She will be on a cardiac monitor tonight. We will monitor for variations in her heart rhythm and/or tachycardia. Again, I do have some suspicion that potentially the medication she was taking over-the-counter contributed to her symptoms, and certainly maybe elevated her heart rate. I cannot explain fully why her troponin is elevated, other than it may have been from a tachyarrhythmia causing secondary heart strain, but nonetheless, we will continue her beta blocker, her anticoagulants, Plavix and baby aspirin. We will also continue her antibiotic for her recent upper respiratory infection that she is treating as well. Expect her to be in the hospital 24-48 hours, and consult with her cardiologist in the morning.   I have discussed the plan of care with the patient.       MD RUSTY Skelton/JOAQUIN  D: 05/01/2018 23:16     T: 05/02/2018 10:08  JOB #: [id number]  CC: Harless Fabry MD

## 2018-05-03 LAB
ANION GAP SERPL CALC-SCNC: 5 MMOL/L (ref 3–18)
BUN SERPL-MCNC: 13 MG/DL (ref 7–18)
BUN/CREAT SERPL: 15 (ref 12–20)
CALCIUM SERPL-MCNC: 8.8 MG/DL (ref 8.5–10.1)
CHLORIDE SERPL-SCNC: 106 MMOL/L (ref 100–108)
CO2 SERPL-SCNC: 30 MMOL/L (ref 21–32)
CREAT SERPL-MCNC: 0.89 MG/DL (ref 0.6–1.3)
ERYTHROCYTE [DISTWIDTH] IN BLOOD BY AUTOMATED COUNT: 14 % (ref 11.6–14.5)
GLUCOSE BLD STRIP.AUTO-MCNC: 95 MG/DL (ref 70–110)
GLUCOSE SERPL-MCNC: 89 MG/DL (ref 74–99)
HCT VFR BLD AUTO: 32.6 % (ref 35–45)
HGB BLD-MCNC: 10.3 G/DL (ref 12–16)
MCH RBC QN AUTO: 29.5 PG (ref 24–34)
MCHC RBC AUTO-ENTMCNC: 31.6 G/DL (ref 31–37)
MCV RBC AUTO: 93.4 FL (ref 74–97)
PLATELET # BLD AUTO: 230 K/UL (ref 135–420)
PMV BLD AUTO: 9.4 FL (ref 9.2–11.8)
POTASSIUM SERPL-SCNC: 4.3 MMOL/L (ref 3.5–5.5)
RBC # BLD AUTO: 3.49 M/UL (ref 4.2–5.3)
SODIUM SERPL-SCNC: 141 MMOL/L (ref 136–145)
WBC # BLD AUTO: 4.7 K/UL (ref 4.6–13.2)

## 2018-05-03 PROCEDURE — 4A023N7 MEASUREMENT OF CARDIAC SAMPLING AND PRESSURE, LEFT HEART, PERCUTANEOUS APPROACH: ICD-10-PCS | Performed by: INTERNAL MEDICINE

## 2018-05-03 PROCEDURE — 85027 COMPLETE CBC AUTOMATED: CPT | Performed by: PHYSICIAN ASSISTANT

## 2018-05-03 PROCEDURE — 80048 BASIC METABOLIC PNL TOTAL CA: CPT | Performed by: PHYSICIAN ASSISTANT

## 2018-05-03 PROCEDURE — 82962 GLUCOSE BLOOD TEST: CPT

## 2018-05-03 PROCEDURE — 74011250636 HC RX REV CODE- 250/636

## 2018-05-03 PROCEDURE — 74011250636 HC RX REV CODE- 250/636: Performed by: INTERNAL MEDICINE

## 2018-05-03 PROCEDURE — 74011250637 HC RX REV CODE- 250/637: Performed by: HOSPITALIST

## 2018-05-03 PROCEDURE — B2111ZZ FLUOROSCOPY OF MULTIPLE CORONARY ARTERIES USING LOW OSMOLAR CONTRAST: ICD-10-PCS | Performed by: INTERNAL MEDICINE

## 2018-05-03 PROCEDURE — 77030010221 CARDIAC CATHETERIZATION

## 2018-05-03 PROCEDURE — 74011000250 HC RX REV CODE- 250

## 2018-05-03 PROCEDURE — 74011000250 HC RX REV CODE- 250: Performed by: INTERNAL MEDICINE

## 2018-05-03 PROCEDURE — 65660000000 HC RM CCU STEPDOWN

## 2018-05-03 PROCEDURE — 74011250637 HC RX REV CODE- 250/637: Performed by: PHYSICIAN ASSISTANT

## 2018-05-03 PROCEDURE — 74011636320 HC RX REV CODE- 636/320: Performed by: INTERNAL MEDICINE

## 2018-05-03 PROCEDURE — 36415 COLL VENOUS BLD VENIPUNCTURE: CPT | Performed by: PHYSICIAN ASSISTANT

## 2018-05-03 RX ORDER — LIDOCAINE HYDROCHLORIDE 10 MG/ML
INJECTION, SOLUTION EPIDURAL; INFILTRATION; INTRACAUDAL; PERINEURAL
Status: COMPLETED
Start: 2018-05-03 | End: 2018-05-03

## 2018-05-03 RX ORDER — MIDAZOLAM HYDROCHLORIDE 1 MG/ML
INJECTION, SOLUTION INTRAMUSCULAR; INTRAVENOUS
Status: COMPLETED
Start: 2018-05-03 | End: 2018-05-03

## 2018-05-03 RX ORDER — MIDAZOLAM HYDROCHLORIDE 1 MG/ML
.5-2 INJECTION, SOLUTION INTRAMUSCULAR; INTRAVENOUS
Status: DISCONTINUED | OUTPATIENT
Start: 2018-05-03 | End: 2018-05-03

## 2018-05-03 RX ORDER — SODIUM CHLORIDE 0.9 % (FLUSH) 0.9 %
5-10 SYRINGE (ML) INJECTION AS NEEDED
Status: DISCONTINUED | OUTPATIENT
Start: 2018-05-03 | End: 2018-05-04 | Stop reason: HOSPADM

## 2018-05-03 RX ORDER — VERAPAMIL HYDROCHLORIDE 2.5 MG/ML
INJECTION, SOLUTION INTRAVENOUS
Status: COMPLETED
Start: 2018-05-03 | End: 2018-05-03

## 2018-05-03 RX ORDER — MAG HYDROX/ALUMINUM HYD/SIMETH 200-200-20
30 SUSPENSION, ORAL (FINAL DOSE FORM) ORAL ONCE
Status: DISPENSED | OUTPATIENT
Start: 2018-05-03 | End: 2018-05-04

## 2018-05-03 RX ORDER — HEPARIN SODIUM 200 [USP'U]/100ML
INJECTION, SOLUTION INTRAVENOUS
Status: DISPENSED
Start: 2018-05-03 | End: 2018-05-04

## 2018-05-03 RX ORDER — HEPARIN SODIUM 1000 [USP'U]/ML
1000-5000 INJECTION, SOLUTION INTRAVENOUS; SUBCUTANEOUS
Status: DISCONTINUED | OUTPATIENT
Start: 2018-05-03 | End: 2018-05-03

## 2018-05-03 RX ORDER — HEPARIN SODIUM 1000 [USP'U]/ML
INJECTION, SOLUTION INTRAVENOUS; SUBCUTANEOUS
Status: COMPLETED
Start: 2018-05-03 | End: 2018-05-03

## 2018-05-03 RX ORDER — SODIUM CHLORIDE 9 MG/ML
75 INJECTION, SOLUTION INTRAVENOUS CONTINUOUS
Status: DISPENSED | OUTPATIENT
Start: 2018-05-03 | End: 2018-05-03

## 2018-05-03 RX ORDER — SODIUM CHLORIDE 0.9 % (FLUSH) 0.9 %
5-10 SYRINGE (ML) INJECTION EVERY 8 HOURS
Status: DISCONTINUED | OUTPATIENT
Start: 2018-05-03 | End: 2018-05-04 | Stop reason: HOSPADM

## 2018-05-03 RX ORDER — VERAPAMIL HYDROCHLORIDE 2.5 MG/ML
2.5 INJECTION, SOLUTION INTRAVENOUS ONCE
Status: COMPLETED | OUTPATIENT
Start: 2018-05-03 | End: 2018-05-03

## 2018-05-03 RX ORDER — LIDOCAINE HYDROCHLORIDE 10 MG/ML
30-100 INJECTION, SOLUTION EPIDURAL; INFILTRATION; INTRACAUDAL; PERINEURAL ONCE
Status: COMPLETED | OUTPATIENT
Start: 2018-05-03 | End: 2018-05-03

## 2018-05-03 RX ORDER — FENTANYL CITRATE 50 UG/ML
25-100 INJECTION, SOLUTION INTRAMUSCULAR; INTRAVENOUS
Status: DISCONTINUED | OUTPATIENT
Start: 2018-05-03 | End: 2018-05-03

## 2018-05-03 RX ORDER — ALBUTEROL SULFATE 90 UG/1
1 AEROSOL, METERED RESPIRATORY (INHALATION)
Status: DISCONTINUED | OUTPATIENT
Start: 2018-05-03 | End: 2018-05-04 | Stop reason: HOSPADM

## 2018-05-03 RX ORDER — HEPARIN SODIUM 200 [USP'U]/100ML
500 INJECTION, SOLUTION INTRAVENOUS
Status: DISCONTINUED | OUTPATIENT
Start: 2018-05-03 | End: 2018-05-03

## 2018-05-03 RX ORDER — FENTANYL CITRATE 50 UG/ML
INJECTION, SOLUTION INTRAMUSCULAR; INTRAVENOUS
Status: COMPLETED
Start: 2018-05-03 | End: 2018-05-03

## 2018-05-03 RX ORDER — GUAIFENESIN 600 MG/1
600 TABLET, EXTENDED RELEASE ORAL EVERY 12 HOURS
Status: DISCONTINUED | OUTPATIENT
Start: 2018-05-03 | End: 2018-05-04 | Stop reason: HOSPADM

## 2018-05-03 RX ADMIN — LIDOCAINE HYDROCHLORIDE 3 ML: 10 INJECTION, SOLUTION EPIDURAL; INFILTRATION; INTRACAUDAL; PERINEURAL at 15:00

## 2018-05-03 RX ADMIN — MIDAZOLAM HYDROCHLORIDE 1 MG: 1 INJECTION, SOLUTION INTRAMUSCULAR; INTRAVENOUS at 15:10

## 2018-05-03 RX ADMIN — SPIRONOLACTONE 12.5 MG: 25 TABLET ORAL at 21:43

## 2018-05-03 RX ADMIN — GUAIFENESIN 600 MG: 600 TABLET, EXTENDED RELEASE ORAL at 12:58

## 2018-05-03 RX ADMIN — Medication 500 MG: at 08:17

## 2018-05-03 RX ADMIN — Medication 10 ML: at 22:00

## 2018-05-03 RX ADMIN — METOPROLOL TARTRATE 25 MG: 25 TABLET ORAL at 08:17

## 2018-05-03 RX ADMIN — FENTANYL CITRATE 50 MCG: 50 INJECTION, SOLUTION INTRAMUSCULAR; INTRAVENOUS at 15:10

## 2018-05-03 RX ADMIN — VERAPAMIL HYDROCHLORIDE 2.5 MG: 2.5 INJECTION, SOLUTION INTRAVENOUS at 15:09

## 2018-05-03 RX ADMIN — ACETAMINOPHEN 650 MG: 325 TABLET ORAL at 08:17

## 2018-05-03 RX ADMIN — HEPARIN SODIUM 4000 UNITS: 1000 INJECTION, SOLUTION INTRAVENOUS; SUBCUTANEOUS at 15:09

## 2018-05-03 RX ADMIN — SODIUM CHLORIDE 75 ML/HR: 900 INJECTION, SOLUTION INTRAVENOUS at 18:17

## 2018-05-03 RX ADMIN — CLOPIDOGREL BISULFATE 75 MG: 75 TABLET ORAL at 08:16

## 2018-05-03 RX ADMIN — FLUTICASONE PROPIONATE 2 SPRAY: 50 SPRAY, METERED NASAL at 08:18

## 2018-05-03 RX ADMIN — LOSARTAN POTASSIUM 25 MG: 25 TABLET ORAL at 21:42

## 2018-05-03 RX ADMIN — VERAPAMIL HYDROCHLORIDE 2.5 MG: 2.5 INJECTION INTRAVENOUS at 15:09

## 2018-05-03 RX ADMIN — FLUTICASONE PROPIONATE 2 SPRAY: 50 SPRAY, METERED NASAL at 21:50

## 2018-05-03 RX ADMIN — VERAPAMIL HYDROCHLORIDE 3 ML: 2.5 INJECTION INTRAVENOUS at 15:10

## 2018-05-03 RX ADMIN — LORATADINE 10 MG: 10 TABLET ORAL at 08:17

## 2018-05-03 RX ADMIN — Medication 10 ML: at 22:03

## 2018-05-03 RX ADMIN — AMOXICILLIN AND CLAVULANATE POTASSIUM 1 TABLET: 875; 125 TABLET, FILM COATED ORAL at 18:16

## 2018-05-03 RX ADMIN — ASPIRIN 81 MG 81 MG: 81 TABLET ORAL at 08:17

## 2018-05-03 RX ADMIN — METOPROLOL TARTRATE 25 MG: 25 TABLET ORAL at 21:42

## 2018-05-03 RX ADMIN — IOPAMIDOL 55 ML: 510 INJECTION, SOLUTION INTRAVASCULAR at 15:17

## 2018-05-03 RX ADMIN — Medication 1 CAPSULE: at 08:17

## 2018-05-03 RX ADMIN — ATORVASTATIN CALCIUM 40 MG: 20 TABLET, FILM COATED ORAL at 21:42

## 2018-05-03 RX ADMIN — PANTOPRAZOLE SODIUM 40 MG: 40 TABLET, DELAYED RELEASE ORAL at 21:42

## 2018-05-03 RX ADMIN — LOSARTAN POTASSIUM 25 MG: 25 TABLET ORAL at 08:17

## 2018-05-03 RX ADMIN — ACETAMINOPHEN 650 MG: 325 TABLET ORAL at 15:58

## 2018-05-03 RX ADMIN — UMECLIDINIUM 1 PUFF: 62.5 AEROSOL, POWDER ORAL at 10:30

## 2018-05-03 RX ADMIN — Medication 10 ML: at 21:50

## 2018-05-03 RX ADMIN — GUAIFENESIN 600 MG: 600 TABLET, EXTENDED RELEASE ORAL at 21:42

## 2018-05-03 RX ADMIN — MULTIPLE VITAMINS W/ MINERALS TAB 1 TABLET: TAB at 08:16

## 2018-05-03 RX ADMIN — AMOXICILLIN AND CLAVULANATE POTASSIUM 1 TABLET: 875; 125 TABLET, FILM COATED ORAL at 08:17

## 2018-05-03 RX ADMIN — FLUTICASONE FUROATE AND VILANTEROL TRIFENATATE 1 PUFF: 100; 25 POWDER RESPIRATORY (INHALATION) at 10:30

## 2018-05-03 NOTE — PROGRESS NOTES
D/c plan home tomorrow   met with patient at bedside she is to have Cardiac cath today at 3pm per patient  Met with patient at bedside she informed cm she lives with her daughter states she is independent. States she has home o2 and has concentrator and portable no other needs or dme per patient. Cm will follow as to needs for safe transition      Care Management Interventions  PCP Verified by CM: Yes  Transition of Care Consult (CM Consult): Discharge Planning  Current Support Network: Other (daughter lives with patient)  Confirm Follow Up Transport: Family  Plan discussed with Pt/Family/Caregiver: Yes  Freedom of Choice Offered:  Yes

## 2018-05-03 NOTE — ROUTINE PROCESS
Cardiac Cath Lab:  Pre Procedure Chart Check     Patients chart was accessed and reviewed for possible and/or scheduled procedure. Creatinine Clearance:  CREATININE: 0.89 MG/DL (05/03/18 0447)  Estimated creatinine clearance: 57 mL/min    Total Contrast  Load:  3 x estimated clearance amount=  171ml    75% of Contrast Load:  0.75 x Total Contrast Load=    128ml    Recent Labs      05/03/18   0447  05/02/18   0405   WBC  4.7   --    RBC  3.49*   --    HCT  32.6*   --    HGB  10.3*   --    PLT  230   --    NA  141   --    K  4.3   --    BUN  13   --    CREA  0.89   --    GFRAA  >60   --    GFRNA  >60   --    CA  8.8   --    CPK   --   70   CKMB   --   1.8   CKND1   --   2.6   TROIQ   --   0.29*       BMI: Body mass index is 28.34 kg/(m^2).     ALLERGIES:   Allergies   Allergen Reactions    Lexapro [Escitalopram] Rash    Zoloft [Sertraline] Rash       Lines:        Peripheral IV 05/01/18 Left Antecubital (Active)   Site Assessment Clean, dry, & intact 5/3/2018  7:45 AM   Phlebitis Assessment 0 5/3/2018  7:45 AM   Infiltration Assessment 0 5/3/2018  7:45 AM   Dressing Status Clean, dry, & intact 5/3/2018  7:45 AM   Dressing Type Tape;Transparent 5/3/2018  7:45 AM   Hub Color/Line Status Pink;Flushed;Capped 5/3/2018  7:45 AM   Action Taken Open ports on tubing capped 5/3/2018  7:45 AM   Alcohol Cap Used Yes 5/3/2018  7:45 AM          History:    Past Medical History:   Diagnosis Date    Asthma     CAD (coronary artery disease)     MI    Cancer ([address]     Cataract     LEFT and RIGHT    Chronic obstructive pulmonary disease ([address]     Diverticulitis     Hypertension     [facility]anoma ([facility]      Past Surgical History:   Procedure Laterality Date    CARDIAC SURG PROCEDURE UNLIST      04/16 Stent    HX GYN      HX HYSTERECTOMY      HX ORTHOPAEDIC      cervical, lumbar     Patient Active Problem List   Diagnosis Code    ST elevation (STEMI) myocardial infarction involving right coronary artery (HCC) I21.11    COPD (chronic obstructive pulmonary disease) (HCC) J44.9    Benign essential HTN I10    Dyslipidemia, goal LDL below 70 E78.5    Elevated troponin R74.8    CAD (coronary artery disease) I25.10

## 2018-05-03 NOTE — PROGRESS NOTES
Problem: Falls - Risk of  Goal: *Absence of Falls  Document Devendra Fall Risk and appropriate interventions in the flowsheet.    Outcome: Progressing Towards Goal  Fall Risk Interventions:            Medication Interventions: Bed/chair exit alarm, Patient to call before getting OOB, Teach patient to arise slowly         History of Falls Interventions: Consult care management for discharge planning, Investigate reason for fall, Room close to nurse's station

## 2018-05-03 NOTE — PROGRESS NOTES
Bedside and Verbal shift change report given to Dina Macdonald RN (oncoming nurse) by Darcy Ray RN (offgoing nurse). Report included the following information SBAR, Kardex, ED Summary, Intake/Output, MAR, Recent Results and Cardiac Rhythm .      Patient Vitals for the past 8 hrs:   Temp Pulse Resp BP SpO2   05/03/18 0330 98.1 °F (36.7 °C) 68 16 103/41 95 %   05/03/18 0010 97.9 °F (36.6 °C) 79 16 119/48 100 %

## 2018-05-03 NOTE — PROCEDURES
[facility] and coronary angiogram done via left radial artery. Left main is normal. LAD has luminal irregularities. LCx has luminal irregularities. Ramus is very small caliber vessel with ostial 80% stenosis. RCA is dominant vessel. RCA has luminal irregularities. Mid RCA has patient stent. Patient tolerated procedure. Continue aspirin, plavix, beta blocker and statin. Plan discussed with patient and family member at bed side.

## 2018-05-03 NOTE — PROGRESS NOTES
0345: Pt received from 46 Erica Vera RN) to on-coming nurse Adebayo Simpson RN). Pt vitals are stable and within normal limits. Pt bed in low position with wheels locked and call bell within reach. 0405: Reassessment completed. Pt resting in bed. Chest rise and fall. NAD.     0500: Pt resting in bed. Chest rise and fall. NAD    0600: Pt resting in bed. Chest rise and fall. NAD.    0730: Report given to Luis Aparicio RN by Adebayo Simpson RN (off-going RN). VSS. Pt bed in low position with wheels locked and call bell within reach. SBAR, vitals, orders and plan of care reviewed. Pt resting in bed chest rise and fall.  CLAIRE Conroy RN

## 2018-05-03 NOTE — PROGRESS NOTES
TRANSFER - OUT REPORT:  Verbal report given to joel pedraza(name) on Lloyd Christian being transferred to care(unit) for routine progression of care   Report consisted of patients Situation, Background, Assessment and   Recommendations(SBAR). Information from the following report(s) SBAR, Kardex, Procedure Summary, MAR and Cardiac Rhythm nsr was reviewed with the receiving nurse. Cath Lab Report:    Procedure:  [x ] LHC  [ ] RHC  [ ] PTCA   [ ] Peripheral   [ ] Pacemaker [ ] ALEXA  [ ] [facility]    Access site:   [ ] Right [x ] Left  [x ] Radial [ ] Brachial [ ] Femoral [ ] Jugular [ ] Chest Wall      Sheath:           [x ] Pulled in Cath Lab   [ ] In place   [ ] To be pulled after:         Closure:          [x ] Radial Band  [ ] Manual Pressure     [ ] Lincoln Rufino     [ ] Star Close    [ ] Per Close    [ ] Safe Guard    Site Assessment:   [x ] Clean, Dry, No bleeding    [ ] Minor oozing          [ ] Hematoma: Description:    Stents(s) Placement:  [ ] Left Main:                 [ ] LAD:                [ ] Circ:                [ ] RCA:                [ ] EF:     [ ] Peripheral:      [ ] N/A    Infusion [ ]Angiomax [ ] Integrelin [ ] Heparin d/c'd:      Intra procedure Medications:    Fentanyl:50mcg  Versed:1mg  Heparin:4000u  Antiplatelet:  Other:    Lines:        Peripheral IV 05/01/18 Left Antecubital (Active)   Site Assessment Clean, dry, & intact 5/3/2018 12:50 PM   Phlebitis Assessment 0 5/3/2018 12:50 PM   Infiltration Assessment 0 5/3/2018 12:50 PM   Dressing Status Clean, dry, & intact 5/3/2018 12:50 PM   Dressing Type Tape;Transparent 5/3/2018 12:50 PM   Hub Color/Line Status Pink;Flushed;Capped 5/3/2018 12:50 PM   Action Taken Open ports on tubing capped 5/3/2018 12:50 PM   Alcohol Cap Used Yes 5/3/2018 12:50 PM          Patient Vitals for the past 4 hrs:   Temp Pulse Resp BP SpO2   05/03/18 1523 98.3 °F (36.8 °C) 88 17 173/79 99 %   05/03/18 1348 98.4 °F (36.9 °C) 80 20 119/58 98 %   05/03/18 1159 98.8 °F (37.1 °C) 74 17 116/47 98 %         Extended / Orthostatic Vitals:    Vital Signs  Level of Consciousness: Alert (05/03/18 1523)  Temp: 98.3 °F (36.8 °C) (05/03/18 1523)  Temp Source: Oral (05/03/18 1523)  Pulse (Heart Rate): 88 (05/03/18 1523)  Heart Rate Source: Monitor (05/03/18 1523)  Cardiac Rhythm: Normal sinus rhythm (05/03/18 1523)  Resp Rate: 17 (05/03/18 1523)  BP: 173/79 (05/03/18 1523)  MAP (Monitor): 81 (05/01/18 2215)  MAP (Calculated): 110 (05/03/18 1523)  BP 1 Location: Right arm (05/03/18 1523)  BP 1 Method: Automatic (05/03/18 1523)  BP Patient Position: At rest;Supine (05/03/18 1523)  MEWS Score: 1 (05/03/18 1523)         Oxygen Therapy  O2 Sat (%): 99 % (05/03/18 1523)  O2 Device: Room air (05/03/18 1348)  O2 Flow Rate (L/min): 2 l/min (05/03/18 0745)          Opportunity for questions and clarification was provided.

## 2018-05-03 NOTE — H&P
Date of Surgery Update:  Lloyd Christian was seen and examined. History and physical has been reviewed. The patient has been examined. There have been no significant clinical changes since the completion of the originally dated History and Physical.      Patient assessed and is candidate for moderate sedation.       Signed By: Mateo Cary MD     May 3, 2018 2:52 PM

## 2018-05-03 NOTE — PROGRESS NOTES
Hospitalist Progress Note    Patient: Khari Good MRN: [id number]  CSN: [id number]    YOB: 1945  Age: 75 y.o. Sex: female    DOA: 5/1/2018 LOS:  LOS: 2 days          Chief Complaint:    Cough    Assessment/Plan     1. Elevated Troponin  2. Hx of CAD  3. HTN  4. COPD on Chronic Home O2 Use  5. GERD  6. Cough    1. Patient had an echocardiogram yesterday and is scheduled to have left heart catheterization today with Dr Sb Garcia at approximately 3PM. Will await results of that procedure and further recommendations from Dr Sb Garcia. No complaints of chest pain. 2. Continue asa and plavix. 3. BP stable. 4. Continue usual home inhalers, inhalers left at bedside per patient request.  5. Continue protonix. 6. Patient previously prescribed augmentin per PCP, started on Monday 4/30 for a course of ten days. Updated orders to reflect course. DVT Prophylaxis - Lovenox  Dispo: TBD, pending cath. Likely home tomorrow. Patient Active Problem List   Diagnosis Code    ST elevation (STEMI) myocardial infarction involving right coronary artery ([facility]) I21.11    COPD (chronic obstructive pulmonary disease) ([facility]) J44.9    Benign essential HTN I10    Dyslipidemia, goal LDL below 70 E78.5    Elevated troponin R74.8    CAD (coronary artery disease) I25.10       Subjective:    Complaints of cough. No CP/SOB.     Review of systems:    Constitutional: denies fevers, chills, myalgias  Respiratory: denies SOB, +cough  Cardiovascular: denies chest pain, palpitations  Gastrointestinal: denies nausea, vomiting, diarrhea      Vital signs/Intake and Output:  Visit Vitals    /47 (BP 1 Location: Left arm, BP Patient Position: At rest)    Pulse 74    Temp 98.8 °F (37.1 °C)    Resp 17    Ht 5' 5\" (1.651 m)    Wt 77.2 kg (170 lb 4.8 oz)    SpO2 98%    Breastfeeding No    BMI 28.34 kg/m2     Current Shift:  05/03 0701 - 05/03 1900  In: 0   Out: 600 [Urine:600]  Last three shifts:  05/01 1901 - 05/03 0700  In: 960 [P.O.:960]  Out: 3600 [Urine:3600]    Exam:    General: Well developed, alert, NAD, OX3  Head/Neck: NCAT, supple, No masses, No lymphadenopathy  CVS:Regular rate and rhythm, no M/R/G, S1/S2 heard, no thrill  Lungs:Clear to auscultation bilaterally, mild end expiratory wheezes, no rhonchi, or rales  Abdomen: Soft, Nontender, No distention, Normal Bowel sounds, No hepatomegaly  Extremities: No C/C/E, pulses palpable 2+  Skin:normal texture and turgor, no rashes, no lesions  Neuro:grossly normal , follows commands  Psych:appropriate                Labs: Results:       Chemistry Recent Labs      05/03/18 0447 05/01/18 2320 05/01/18   1418   GLU  89  125*  100*   NA  141  144  144   K  4.3  3.8  3.8   CL  106  106  104   CO2  30  26  31   BUN  13  14  15   CREA  0.89  0.73  0.77   CA  8.8  8.7  8.7   AGAP  5  12  9   BUCR  15  19  19   AP   --    --   68   TP   --    --   7.0   ALB   --    --   4.0   GLOB   --    --   3.0   AGRAT   --    --   1.3      CBC w/Diff Recent Labs      05/03/18 0447 05/01/18 2320 05/01/18   1418   WBC  4.7  7.0  6.1   RBC  3.49*  3.79*  3.72*   HGB  10.3*  11.3*  11.1*   HCT  32.6*  34.8*  34.4*   PLT  230  248  228   GRANS   --    --   73   LYMPH   --    --   17*   EOS   --    --   1      Cardiac Enzymes Recent Labs      05/02/18   0405  05/01/18   2320   CPK  70  77   CKND1  2.6  2.5      Coagulation No results for input(s): PTP, INR, APTT in the last 72 hours. No lab exists for component: INREXT    Lipid Panel Lab Results   Component Value Date/Time    Cholesterol, total 148 04/21/2016 05:00 AM    HDL Cholesterol 53 04/21/2016 05:00 AM    LDL, calculated 75.6 04/21/2016 05:00 AM    VLDL, calculated 19.4 04/21/2016 05:00 AM    Triglyceride 97 04/21/2016 05:00 AM    CHOL/HDL Ratio 2.8 04/21/2016 05:00 AM      BNP No results for input(s): BNPP in the last 72 hours.    Liver Enzymes Recent Labs      05/01/18   1418   TP  7.0   ALB  4.0   AP  68   SGOT  17      Thyroid Studies No results found for: T4, T3U, TSH, TSHEXT     Procedures/imaging: see electronic medical records for all procedures/Xrays and details which were not copied into this note but were reviewed prior to creation of [address]

## 2018-05-03 NOTE — CONSULTS
[facility] Consult Note      Patient: Wilma Morrell MRN: [id number]  SSN: [id number]    YOB: 1945  Age: 75 y.o. Sex: female    Date of Consultation: 05/01/2018  Referring Physician: Gina MAY  Reason for Consultation: Abnormal troponin    Chief complain: Elevated BP    HPI: 57-year-old female brought to emergency room with elevated blood pressure. Patient is complaining of cough for past few days. She is wearing event monitor and it was started beeping. She checked her blood pressure and it was elevated  She did not feel any chest pain or shortness of breath. She did not feel any dizziness, palpitation, presyncope or syncope. Cardiology consult was called for abnormal troponin. Patient denies history of coronary artery disease and STEMI in April 2016. At that time she did not feel much chest pain.     Past Medical History:   Diagnosis Date    Asthma     CAD (coronary artery disease)     MI    Cancer ([facility] [address] [facility]     Cataract     LEFT and RIGHT    Chronic obstructive pulmonary disease (HCC)     Diverticulitis     Hypertension     Melanoma ([facility] [address] [facility]      Past Surgical History:   Procedure Laterality Date    CARDIAC SURG PROCEDURE UNLIST      04/16 Stent    HX GYN      HX HYSTERECTOMY      HX ORTHOPAEDIC      cervical, lumbar     Current Facility-Administered Medications   Medication Dose Route Frequency    pantoprazole (PROTONIX) tablet 40 mg  40 mg Oral QHS    spironolactone (ALDACTONE) tablet 25 mg  25 mg Oral QHS    fluticasone (FLONASE) 50 mcg/actuation nasal spray 2 Spray  2 Spray Both Nostrils Q12H    enoxaparin (LOVENOX) injection 80 mg  80 mg SubCUTAneous Q12H    fluticasone-vilanterol (BREO ELLIPTA) 100mcg-25mcg/puff  1 Puff Inhalation DAILY    clopidogrel (PLAVIX) tablet 75 mg  75 mg Oral DAILY    metoprolol tartrate (LOPRESSOR) tablet 25 mg  25 mg Oral Q12H    fish oil-omega-3 fatty acids 340-1,000 mg capsule 1 Cap  1 Cap Oral DAILY    umeclidinium (INCRUSE ELLIPTA) 62.5 mcg/actuation  1 Puff Inhalation DAILY    aspirin chewable tablet 81 mg  81 mg Oral DAILY    ondansetron (ZOFRAN) injection 4 mg  4 mg IntraVENous Q6H PRN    acetaminophen (TYLENOL) tablet 650 mg  650 mg Oral Q6H PRN    nitroglycerin (NITROSTAT) tablet 0.4 mg  0.4 mg SubLINGual PRN    hydrALAZINE (APRESOLINE) 20 mg/mL injection 20 mg  20 mg IntraVENous Q6H PRN    amoxicillin-clavulanate (AUGMENTIN) 875-125 mg per tablet 1 Tab  1 Tab Oral BID WITH MEALS    losartan (COZAAR) tablet 25 mg  25 mg Oral BID    atorvastatin (LIPITOR) tablet 40 mg  40 mg Oral DAILY    multivitamin, tx-iron-ca-min (THERA-M w/ IRON) tablet 1 Tab  1 Tab Oral DAILY    ascorbic acid (vitamin C) (VITAMIN C) tablet 500 mg  500 mg Oral DAILY    loratadine (CLARITIN) tablet 10 mg  10 mg Oral DAILY       Allergies and Intolerances: Allergies   Allergen Reactions    Lexapro [Escitalopram] Rash    Zoloft [Sertraline] Rash       Family History:   History reviewed. No pertinent family history. Social History:   She  reports that she has quit smoking. She has never used smokeless tobacco.  She  reports that she does not drink alcohol. Review of Systems:     Gen: No fever, chills, malaise, weight loss/gain. Heent: No headache, rhinorrhea, epistaxis, ear pain, hearing loss, sinus pain, neck pain/stiffness, sore throat. Heart: No chest pain, palpitations, shortness of breath, pnd, or orthopnea. Resp: Positive cough, No hemoptysis, wheezing and dyspnea   GI: No nausea, vomiting, diarrhea, constipation, melena or hematochezia. : No urinary obstruction, dysuria or hematuria. Derm: No rash, new skin lesion or pruritis. Musc/skeletal: no bone or joint complains. Vasc: No edema, cyanosis or claudication. Endo: No heat/cold intolerance, no polyuria,polydipsia or polyphagia. Neuro: No unilateral weakness, numbness, tingling. No seizures. Heme: No easy bruising or bleeding.       Physical:   Patient Vitals for the past 6 hrs:   Temp Pulse Resp BP SpO2   05/02/18 1954 98.3 °F (36.8 °C) 89 18 122/51 100 %         Exam:   General Appearance: Comfortable, not using accessory muscles of respiration. HEENT: MICHEAL. HEAD: Atraumatic  NECK: No JVD, no thyroidomeglay. CAROTIDS: No bruit  LUNGS: bilateral occasional expiratory wheezing, no creps   HEART: S1+S2 audible, no murmur, no pericardial rub. ABD: Non-tender, BS Audible    EXT: No edema, and no cyanosis. VASCULAR EXAM: Pulses are intact. PSYCHIATRIC EXAM: Mood is appropriate. MUSCULOSKELETAL: Grossly no joint deformity. NEUROLOGICAL: AAO times 3, Motor and sensory sytem intact.     Review of Data:   LABS:   Lab Results   Component Value Date/Time    WBC 7.0 05/01/2018 11:20 PM    HGB 11.3 (L) 05/01/2018 11:20 PM    HCT 34.8 (L) 05/01/2018 11:20 PM    PLATELET 219 22/87/7953 11:20 PM     Lab Results   Component Value Date/Time    Sodium 144 05/01/2018 11:20 PM    Potassium 3.8 05/01/2018 11:20 PM    Chloride 106 05/01/2018 11:20 PM    CO2 26 05/01/2018 11:20 PM    Glucose 125 (H) 05/01/2018 11:20 PM    BUN 14 05/01/2018 11:20 PM    Creatinine 0.73 05/01/2018 11:20 PM     Lab Results   Component Value Date/Time    Cholesterol, total 148 04/21/2016 05:00 AM    HDL Cholesterol 53 04/21/2016 05:00 AM    LDL, calculated 75.6 04/21/2016 05:00 AM    Triglyceride 97 04/21/2016 05:00 AM     No results found for: GPT  No results found for: HBA1C, HGBE8, OYA9EQKQ, NSF0MDXI      Cardiology Procedures:   Results for orders placed or performed during the hospital encounter of 05/01/18   EKG, 12 LEAD, INITIAL   Result Value Ref Range    Ventricular Rate 92 BPM    Atrial Rate 92 BPM    P-R Interval 174 ms    QRS Duration 88 ms    Q-T Interval 374 ms    QTC Calculation (Bezet) 462 ms    Calculated P Axis 75 degrees    Calculated R Axis 63 degrees    Calculated T Axis 68 degrees    Diagnosis       Normal sinus rhythm  Normal ECG  Confirmed by Janine Schneider MD, [id number] on 5/1/2018 10:33:19 PM Impression / Plan:    Patient Active Problem List   Diagnosis Code         COPD [facility]chronic obstructive pulmonary disease) ([facility]) J44.9    Benign essential HTN I10    Dyslipidemia, goal LDL below 70 E78.5    Elevated troponin R74.8    CAD (coronary artery disease) I25.10     NSTEMI  COPD on Home oxygen   Cough     Echocardiogram revealed basal inferior hypokinesis with LVEF 55-60%. Continue aspirin, Plavix, beta blocker, statin and full dose of Lovenox. Advise about LEFT heart catheterization, coronary angiography plus or minus PCI. All risks, benefits and alternatives explained to patient and her family member and they verbally understood. Continue management as per hospital medicine. NPO after midnight. 40 minutes of critical care time spent in the direct evaluation and treatment of this high risk patient. The reason for providing this level of medical care for this critically ill patient was due a critical illness that impaired one or more vital organ systems such that there was a high probability of imminent or life threatening deterioration in the patients condition. This care involved high complexity decision making to assess, manipulate, and support vital system functions, to treat this degree vital organ system failure and to prevent further life threatening deterioration of the patients condition.           Signed By: Brody Baird MD     May 2, 2018

## 2018-05-03 NOTE — PROGRESS NOTES
TR Band to left wrist discontinued. Pt tolerated well. C/o minor numbness at base of thumb but reports gradual improvement. Site remains wnl.

## 2018-05-03 NOTE — ROUTINE PROCESS
Bedside and Verbal shift change report given to Jose Weinberg RN  (oncoming nurse) by Chris Quintana RN (offgoing nurse). Report included the following information SBAR, Kardex, ED Summary, Procedure Summary, MAR, Accordion, Recent Results and Med Rec Status.

## 2018-05-03 NOTE — PROGRESS NOTES
0730 Assumed care of pt from Aileen Choudhary RN. Pt resting quietly in bed , no signs of distress, call bell within reach. 0745 Assessment completed. Pt denies chest pain or shortness of breath. Pt is on 2L NC at all times. Per Dr Severo German okay to eat a light breakfast, NPO after that. Pt scheduled to go down for [facility] at 1500. Will continue to monitor. Shift Summary- Shift uneventful. Pt rested throughout shift, denied chest pain or shortness of breath. Pt on 2L NC at all times. [facility] puncture site CDI.

## 2018-05-03 NOTE — PROGRESS NOTES
TRANSFER - OUT REPORT:    Verbal report given to Carolyn Stewart RN on Daniel Rounds  being transferred to [facility] for routine progression of care       Report consisted of patients Situation, Background, Assessment and   Recommendations(SBAR). Information from the following report(s) Procedure Summary, Intake/Output, MAR and Cardiac Rhythm NSR was reviewed with the receiving nurse. Lines:   Peripheral IV 05/01/18 Left Antecubital (Active)   Site Assessment Clean, dry, & intact 5/3/2018 12:50 PM   Phlebitis Assessment 0 5/3/2018 12:50 PM   Infiltration Assessment 0 5/3/2018 12:50 PM   Dressing Status Clean, dry, & intact 5/3/2018 12:50 PM   Dressing Type Tape;Transparent 5/3/2018 12:50 PM   Hub Color/Line Status Pink;Flushed;Capped 5/3/2018 12:50 PM   Action Taken Open ports on tubing capped 5/3/2018 12:50 PM   Alcohol Cap Used Yes 5/3/2018 12:50 PM        Opportunity for questions and clarification was provided.       Patient transported with:   O2 @ 2 liters  Registered Nurse

## 2018-05-04 VITALS
HEIGHT: 65 IN | OXYGEN SATURATION: 100 % | WEIGHT: 158.07 LBS | SYSTOLIC BLOOD PRESSURE: 125 MMHG | BODY MASS INDEX: 26.34 KG/M2 | RESPIRATION RATE: 18 BRPM | TEMPERATURE: 98 F | DIASTOLIC BLOOD PRESSURE: 55 MMHG | HEART RATE: 86 BPM

## 2018-05-04 PROBLEM — I21.4 NSTEMI (NON-ST ELEVATED MYOCARDIAL INFARCTION) (HCC): Status: ACTIVE | Noted: 2018-05-04

## 2018-05-04 LAB
ANION GAP SERPL CALC-SCNC: 9 MMOL/L (ref 3–18)
BUN SERPL-MCNC: 14 MG/DL (ref 7–18)
BUN/CREAT SERPL: 19 (ref 12–20)
CALCIUM SERPL-MCNC: 8.5 MG/DL (ref 8.5–10.1)
CHLORIDE SERPL-SCNC: 105 MMOL/L (ref 100–108)
CO2 SERPL-SCNC: 28 MMOL/L (ref 21–32)
CREAT SERPL-MCNC: 0.74 MG/DL (ref 0.6–1.3)
ERYTHROCYTE [DISTWIDTH] IN BLOOD BY AUTOMATED COUNT: 13.8 % (ref 11.6–14.5)
GLUCOSE SERPL-MCNC: 99 MG/DL (ref 74–99)
HCT VFR BLD AUTO: 33.9 % (ref 35–45)
HGB BLD-MCNC: 11 G/DL (ref 12–16)
MCH RBC QN AUTO: 30 PG (ref 24–34)
MCHC RBC AUTO-ENTMCNC: 32.4 G/DL (ref 31–37)
MCV RBC AUTO: 92.4 FL (ref 74–97)
PLATELET # BLD AUTO: 248 K/UL (ref 135–420)
PMV BLD AUTO: 9.6 FL (ref 9.2–11.8)
POTASSIUM SERPL-SCNC: 3.8 MMOL/L (ref 3.5–5.5)
RBC # BLD AUTO: 3.67 M/UL (ref 4.2–5.3)
SODIUM SERPL-SCNC: 142 MMOL/L (ref 136–145)
WBC # BLD AUTO: 6.5 K/UL (ref 4.6–13.2)

## 2018-05-04 PROCEDURE — 80048 BASIC METABOLIC PNL TOTAL CA: CPT | Performed by: PHYSICIAN ASSISTANT

## 2018-05-04 PROCEDURE — 74011250637 HC RX REV CODE- 250/637: Performed by: PHYSICIAN ASSISTANT

## 2018-05-04 PROCEDURE — 85027 COMPLETE CBC AUTOMATED: CPT | Performed by: PHYSICIAN ASSISTANT

## 2018-05-04 PROCEDURE — 36415 COLL VENOUS BLD VENIPUNCTURE: CPT | Performed by: PHYSICIAN ASSISTANT

## 2018-05-04 PROCEDURE — 74011250637 HC RX REV CODE- 250/637: Performed by: HOSPITALIST

## 2018-05-04 PROCEDURE — 77010033678 HC OXYGEN DAILY

## 2018-05-04 RX ORDER — SPIRONOLACTONE 25 MG/1
12.5 TABLET ORAL 2 TIMES DAILY
Status: DISCONTINUED | OUTPATIENT
Start: 2018-05-04 | End: 2018-05-04 | Stop reason: HOSPADM

## 2018-05-04 RX ORDER — METOPROLOL TARTRATE 25 MG/1
12.5 TABLET, FILM COATED ORAL EVERY 12 HOURS
Status: DISCONTINUED | OUTPATIENT
Start: 2018-05-04 | End: 2018-05-04 | Stop reason: HOSPADM

## 2018-05-04 RX ORDER — METOPROLOL TARTRATE 25 MG/1
12.5 TABLET, FILM COATED ORAL EVERY 12 HOURS
Status: DISCONTINUED | OUTPATIENT
Start: 2018-05-04 | End: 2018-05-04

## 2018-05-04 RX ORDER — GUAIFENESIN 100 MG/5ML
81 LIQUID (ML) ORAL DAILY
Qty: 30 TAB | Refills: 0 | Status: SHIPPED | OUTPATIENT
Start: 2018-05-05 | End: 2019-06-14

## 2018-05-04 RX ORDER — SPIRONOLACTONE 25 MG/1
12.5 TABLET ORAL 2 TIMES DAILY
Qty: 1 TAB | Refills: 0 | Status: SHIPPED
Start: 2018-05-04

## 2018-05-04 RX ORDER — GUAIFENESIN 600 MG/1
600 TABLET, EXTENDED RELEASE ORAL EVERY 12 HOURS
Qty: 10 TAB | Refills: 0 | Status: SHIPPED | OUTPATIENT
Start: 2018-05-04 | End: 2019-06-14

## 2018-05-04 RX ORDER — AMOXICILLIN AND CLAVULANATE POTASSIUM 875; 125 MG/1; MG/1
1 TABLET, FILM COATED ORAL EVERY 12 HOURS
Qty: 10 TAB | Refills: 0 | Status: SHIPPED
Start: 2018-05-04 | End: 2018-05-09

## 2018-05-04 RX ORDER — SPIRONOLACTONE 25 MG/1
12.5 TABLET ORAL 2 TIMES DAILY
Status: DISCONTINUED | OUTPATIENT
Start: 2018-05-04 | End: 2018-05-04

## 2018-05-04 RX ORDER — LOSARTAN POTASSIUM 25 MG/1
25 TABLET ORAL 2 TIMES DAILY
Qty: 1 TAB | Refills: 0 | Status: SHIPPED
Start: 2018-05-04

## 2018-05-04 RX ORDER — METOPROLOL TARTRATE 25 MG/1
12.5 TABLET, FILM COATED ORAL EVERY 12 HOURS
Qty: 1 TAB | Refills: 0 | Status: SHIPPED
Start: 2018-05-04

## 2018-05-04 RX ADMIN — LORATADINE 10 MG: 10 TABLET ORAL at 09:22

## 2018-05-04 RX ADMIN — ASPIRIN 81 MG 81 MG: 81 TABLET ORAL at 09:22

## 2018-05-04 RX ADMIN — FLUTICASONE FUROATE AND VILANTEROL TRIFENATATE 1 PUFF: 100; 25 POWDER RESPIRATORY (INHALATION) at 09:24

## 2018-05-04 RX ADMIN — Medication 500 MG: at 09:22

## 2018-05-04 RX ADMIN — Medication 1 CAPSULE: at 09:22

## 2018-05-04 RX ADMIN — LOSARTAN POTASSIUM 25 MG: 25 TABLET ORAL at 09:22

## 2018-05-04 RX ADMIN — AMOXICILLIN AND CLAVULANATE POTASSIUM 1 TABLET: 875; 125 TABLET, FILM COATED ORAL at 09:22

## 2018-05-04 RX ADMIN — Medication 10 ML: at 06:42

## 2018-05-04 RX ADMIN — UMECLIDINIUM 1 PUFF: 62.5 AEROSOL, POWDER ORAL at 09:24

## 2018-05-04 RX ADMIN — SPIRONOLACTONE 12.5 MG: 25 TABLET ORAL at 11:30

## 2018-05-04 RX ADMIN — CLOPIDOGREL BISULFATE 75 MG: 75 TABLET ORAL at 09:22

## 2018-05-04 RX ADMIN — MULTIPLE VITAMINS W/ MINERALS TAB 1 TABLET: TAB at 09:22

## 2018-05-04 RX ADMIN — GUAIFENESIN 600 MG: 600 TABLET, EXTENDED RELEASE ORAL at 09:22

## 2018-05-04 RX ADMIN — METOPROLOL TARTRATE 12.5 MG: 25 TABLET ORAL at 11:30

## 2018-05-04 RX ADMIN — FLUTICASONE PROPIONATE 2 SPRAY: 50 SPRAY, METERED NASAL at 09:24

## 2018-05-04 NOTE — ROUTINE PROCESS
Dual AVS reviewed with Roman Vanessa RN. All medications reviewed individually with patient. Opportunities for questions and concerns provided. Patient discharged via (mode of transport ie. Car, ambulance or air transport) car  Patient's arm band appropriately discarded.       Pt is being picked up at  by her

## 2018-05-04 NOTE — PROGRESS NOTES
D/c plan: home today  Met with patient she informed cm she is ready for d/c today. Patient will need follow up with pcp, cardiologist. Patient wears o2 @ 2LPM via NC. She declines any other assistance. States she lives with he daughter she lives in the mother in law suite. Denies need for other DME or other needs for safe d/c. Patient states her family will pick her up. Care Management Interventions  PCP Verified by CM:  Yes  Transition of Care Consult (CM Consult): Discharge Planning  Current Support Network: Relative's Home  Confirm Follow Up Transport: Family  Plan discussed with Pt/Family/Caregiver: Yes  Freedom of Choice Offered: Yes  Discharge Location  Discharge Placement: Home with family assistance

## 2018-05-04 NOTE — PROGRESS NOTES
1900: Verbal shift change report received from to werner Crawford RN (off-going nurse) to MATTHEW Christy RN (oncoming nurse) Report included the following information SBAR, Kardex, Intake/Output, MAR, Recent Results, Med Rec Status and anticipated POC. Pt stable. In NAD. 2010: Shift assessment completed. Pt sitting in bed talking with visitors. NAD. Pt denies pain/discomfort. 2130: RN informed by Anshul Norman ([facility]) that the patients IV site had started to leak. RN assessed site. Pt complained the site was no uncomfortable. Site is no longer patent. PIV removed from [address] [place] [address]. 2135Elinirvin Carrillo (PCT) attempted to start new IV line on pt; unsuccessful. Pt stated that she did not want anyone else on the unit to stick her because she had already been stuck several times throughout her stay; Pt demanded that the \"IV team\" complete the task. Pt informed that there was not an IV team at this facility; but requested that she allow the assigned RN to attempt. Pt agreed if a \"small\" needle was used. 2150: IV attempt successful. 22g IV placed in RAC. IV flushed with 10mL NS. Site c/d/i. Scheduled medications administered as ordered. Pt requested to use home med albuterol prn. MD approved. Rx would not allow because the medication is in stock. They will load to pt profile shortly. 2230: Pt resting in bed. Chest rise and fall. NAD. Radial cath site assessed. Dressing c/d/i. Pulses +2    2300: RN called pharmacy to check status of albuterol. Rx tech stated that he thought it had been brought up already on the unit. RX stated that he would bring to unit shortly    2305: Shift reassessment completed. No change from previous assessment. Pt requested status on albuterol. Pt informed that it would should be arriving on the unit shortly. 0002: Albuterol arrived on unit. Pt resting in bed. Chest rise and fall. NAD    0100: Pt resting in bed. Chest rise and fall. NAD     0158: Pt resting in bed. Chest rise and fall.  NAD    0342: Shift reassessment complete. No change from previous assessment. Pt resting in bed. Chest rise and fall. NAD    0430: Pt resting in bed. Chest rise and fall. NAD.     0530: Pt resting in bed. Chest rise and fall. NAD    0630: Pt getting labs drawn, awake, alert, no complaints    0725: Bedside and Verbal shift change report given to Cici Feldman RN (oncoming nurse) by José Patel RN (off-going nurse). Report included the following information SBAR, Kardex, Intake/Output, MAR, Recent Results, Med Rec Status and anticipated POC. Pt stable. In NAD.       Zuleyka Fine RN

## 2018-05-04 NOTE — PROGRESS NOTES
0764 Assumed care of pt from Ciarra Urbina. Pt resting quietly in bed , no signs of distress, call bell within reach. 5550 Assessment completed. Puncture site on rt wrist CDI, placed in immobilizer. Pt denies any chest pain or shortness of breath.   -spoke with LALO Storm, pt takes Metoprolol 12.5 mg twice daily and Spironolactone 12.5 mg twice daily. Shift Summary- Shift uneventful. Pt rested throughout shift. Denied chest pain or shortness of breath.

## 2018-05-04 NOTE — PROGRESS NOTES
Cardiology Progress Note        Patient: Myranda Ambriz        Sex: female          DOA: 5/1/2018  YOB: 1945      Age:  75 y.o.        LOS:  LOS: 3 days    Patient seen and examined, chart reviewed. Assessment/Plan     Patient Active Problem List   Diagnosis Code         COPD (chronic obstructive pulmonary disease) ([facility] J44.9    Benign essential HTN I10    Dyslipidemia, goal LDL below 70 E78.5         CAD (coronary artery disease) I25.10    NSTEMI (non-ST elevated myocardial infarction) ([facility] [address] I21.4      Cough   on home oxygen  Coronary angiogram revealed patent stent in mRCA. Ramus is very small caliber vessel has ostial 80% stenosis. Due to very small size it is not feasible for PTCA or PCI. Plan:    Continue aspirin, Plavix, beta blocker and stain. Ok to discharge from CV stand point. Patient will follow up with  after discharge. Subjective:    cc:  Denies any chest pain or shortness of breath. No pain at procedure side. REVIEW OF SYSTEMS:     General: No fevers or chills. Cardiovascular: No chest pain,No palpitations, No orthopnea, No PND, No leg swelling, No claudication  Pulmonary: No dyspnea. Gastrointestinal: No nausea, vomiting, bleeding  Neurology: No Dizziness    Objective:      Visit Vitals    /55 (BP 1 Location: Right arm, BP Patient Position: At rest)    Pulse 86    Temp 98 °F (36.7 °C)    Resp 18    Ht 5' 5\" (1.651 m)    Wt 71.7 kg (158 lb 1.1 oz)    SpO2 100%    Breastfeeding No    BMI 26.3 kg/m2     Body mass index is 26.3 kg/(m^2). Physical Exam:  General Appearance: Comfortable, not using accessory muscles of respiration. HEENT: MICHEAL. HEAD: Atraumatic  NECK: No JVD, no thyroidomeglay. CAROTIDS: No bruit  LUNGS: Clear bilaterally. HEART: S1+S2 audible, no murmur, no pericardial rub. ABD: Non-tender, BS Audible    EXT: No edema, and no cyanosis.  Left wrist: No swelling, no hematoma, no ecchymosis, no tenderness, left radial pulse +, normal sensory and motor exam of left hand   VASCULAR EXAM: Pulses are intact. PSYCHIATRIC EXAM: Mood is appropriate. MUSCULOSKELETAL: Grossly no joint deformity. NEUROLOGICAL: AAO times 3, No motor and sensory deficit.   Medication:  Current Facility-Administered Medications   Medication Dose Route Frequency    metoprolol tartrate (LOPRESSOR) tablet 12.5 mg  12.5 mg Oral Q12H    spironolactone (ALDACTONE) tablet 12.5 mg  12.5 mg Oral BID    guaiFENesin ER (MUCINEX) tablet 600 mg  600 mg Oral Q12H    sodium chloride (NS) flush 5-10 mL  5-10 mL IntraVENous Q8H    sodium chloride (NS) flush 5-10 mL  5-10 mL IntraVENous PRN    albuterol (PROVENTIL HFA, VENTOLIN HFA, PROAIR HFA) inhaler 1 Puff (Patient Supplied)  1 Puff Inhalation Q2H PRN    pantoprazole (PROTONIX) tablet 40 mg  40 mg Oral QHS    fluticasone (FLONASE) 50 mcg/actuation nasal spray 2 Spray  2 Spray Both Nostrils Q12H    fluticasone-vilanterol (BREO ELLIPTA) 100mcg-25mcg/puff  1 Puff Inhalation DAILY    clopidogrel (PLAVIX) tablet 75 mg  75 mg Oral DAILY    fish oil-omega-3 fatty acids 340-1,000 mg capsule 1 Cap  1 Cap Oral DAILY    umeclidinium (INCRUSE ELLIPTA) 62.5 mcg/actuation  1 Puff Inhalation DAILY    aspirin chewable tablet 81 mg  81 mg Oral DAILY    ondansetron (ZOFRAN) injection 4 mg  4 mg IntraVENous Q6H PRN    acetaminophen (TYLENOL) tablet 650 mg  650 mg Oral Q6H PRN    nitroglycerin (NITROSTAT) tablet 0.4 mg  0.4 mg SubLINGual PRN    hydrALAZINE (APRESOLINE) 20 mg/mL injection 20 mg  20 mg IntraVENous Q6H PRN    amoxicillin-clavulanate (AUGMENTIN) 875-125 mg per tablet 1 Tab  1 Tab Oral BID WITH MEALS    losartan (COZAAR) tablet 25 mg  25 mg Oral BID    atorvastatin (LIPITOR) tablet 40 mg  40 mg Oral DAILY    multivitamin, tx-iron-ca-min (THERA-M w/ IRON) tablet 1 Tab  1 Tab Oral DAILY    ascorbic acid (vitamin C) (VITAMIN C) tablet 500 mg  500 mg Oral DAILY    loratadine (CLARITIN) tablet 10 mg  10 mg Oral DAILY     Current Outpatient Prescriptions   Medication Sig    amoxicillin-clavulanate (AUGMENTIN) 875-125 mg per tablet Take 1 Tab by mouth every twelve (12) hours for 5 days.  losartan (COZAAR) 25 mg tablet Take 1 Tab by mouth two (2) times a day.  metoprolol tartrate (LOPRESSOR) 25 mg tablet Take 0.5 Tabs by mouth every twelve (12) hours.  spironolactone (ALDACTONE) 25 mg tablet Take 0.5 Tabs by mouth two (2) times a day.  [START ON 5/5/2018] aspirin 81 mg chewable tablet Take 1 Tab by mouth daily.  guaiFENesin ER (MUCINEX) 600 mg ER tablet Take 1 Tab by mouth every twelve (12) hours.  fluticasone propionate (FLONASE NA) 2 Sprays by Nasal route.  multivitamin (ONE A DAY) tablet Take 1 Tab by mouth daily.  ascorbic acid, vitamin C, (VITAMIN C) 500 mg tablet Take 800 mg by mouth daily.  atorvastatin (LIPITOR) 40 mg tablet Take 40 mg by mouth daily.  budesonide-formoterol (SYMBICORT) 160-4.5 mcg/actuation HFAA Take 2 Puffs by inhalation two (2) times a day.  clopidogrel (PLAVIX) 75 mg tablet Take 75 mg by mouth daily.  pantoprazole (PROTONIX) 40 mg tablet Take 40 mg by mouth daily.  omega-3 fatty acids-vitamin e (FISH OIL) 1,000 mg cap Take 1 Cap by mouth. Dosage is 1200mg/600mg once daily    albuterol (PROVENTIL HFA, VENTOLIN HFA, PROAIR HFA) 90 mcg/actuation inhaler Take 1 Puff by inhalation as needed for Wheezing.  tiotropium (SPIRIVA) 18 mcg inhalation capsule Take 1 Cap by inhalation daily.  loratadine (CLARITIN) 10 mg tablet Take 10 mg by mouth daily.  allergy injection by SubCUTAneous route.  Does not know dosage               Lab/Data Reviewed:       Recent Labs      05/04/18   0318  05/03/18   0447  05/01/18   2320   WBC  6.5  4.7  7.0   HGB  11.0*  10.3*  11.3*   HCT  33.9*  32.6*  34.8*   PLT  248  230  248     Recent Labs      05/04/18   0318  05/03/18   0447  05/01/18   2320   NA  142  141  144   K 3.8  4.3  3.8   CL  105  106  106   CO2  28  30  26   GLU  99  89  125*   BUN  14  13  14   CREA  0.74  0.89  0.73   CA  8.5  8.8  8.7       Signed By: Armando Antonio MD     May 4, 2018

## 2018-05-04 NOTE — CDMP QUERY
Due to differing documentation concerning the PDx of this patient, please clarify:    =>NSTEMI POA  (noted on Cardiac cath report)  =>Elevated troponin, associated with tachycardia   =>Other Explanation of clinical findings  =>Unable to Determine (no explanation of clinical findings)    The medical record reflects the following:    Risk:Elderly patient    Clinical Indicators:Elderly patient was wearing a Holter monitor and it starting beeping. Pt had elevated BP  CK=70  Troponin=0.02, 0.17, 0.29    Treatment: Echo and cardiac cath done, cardiac meds continued    Please clarify and document your clinical opinion in the progress notes and discharge summary including the definitive and/or presumptive diagnosis, (suspected or probable), related to the above clinical findings. Please include clinical findings supporting your diagnosis. If you DECLINE this query or would like to communicate with [facility], please utilize the \"[employer] message box\" at the TOP of the Progress Note on the right.       Thank you,  Radha Portillo RN [facility]  [phone]

## 2018-05-04 NOTE — DISCHARGE INSTRUCTIONS
HEART CATHETERIZATION/ANGIOGRAPHY DISCHARGE INSTRUCTIONS    1. Check puncture site frequently for swelling or bleeding. If there is any bleeding, lie down and apply pressure over the area with a clean towel or washcloth. Notify your doctor for any redness, swelling, drainage, or oozing from the puncture site. Notify your doctor for any fever or chills. 2. If the extremity becomes cold, numb, or painful call Dr. Yumiko Morelos office. 3. Activity should be limited for the next 48 hours. Climb stairs as little as possible and avoid any stooping, bending, or strenuous activity for 48 hours. No heavy lifting (anything over 10 pounds) for 3 days. 4. You may resume your usual diet. Drink more fluids than usual.  5. Have a responsible person drive you home and stay with you for at least 24 hours after your heart catheterization/angiography. 6. You may remove bandage from your Left and Arm in 24 hours. You may shower in 24 hours. No tub baths, hot tubs, or swimming for 1 week. Do not place any lotions, creams, powders, or ointments over puncture site for 1 week. You may place a clean band-aid over the puncture site each day for 5 days. Change daily. I have read the above instructions and have had the opportunity to ask questions. Patient: ________________________   Date: 5/3/2018    Witness: _______________________   Date: 5/3/2018           DISCHARGE SUMMARY from Nurse    PATIENT INSTRUCTIONS:    After general anesthesia or intravenous sedation, for 24 hours or while taking prescription Narcotics:  · Limit your activities  · Do not drive and operate hazardous machinery  · Do not make important personal or business decisions  · Do  not drink alcoholic beverages  · If you have not urinated within 8 hours after discharge, please contact your surgeon on call.     Report the following to your surgeon:  · Excessive pain, swelling, redness or odor of or around the surgical area  · Temperature over 100.5  · Nausea and vomiting lasting longer than 4 hours or if unable to take medications  · Any signs of decreased circulation or nerve impairment to extremity: change in color, persistent  numbness, tingling, coldness or increase pain  · Any questions    What to do at Home:  Recommended activity: Activity as tolerated    *  Please give a list of your current medications to your Primary Care Provider. *  Please update this list whenever your medications are discontinued, doses are      changed, or new medications (including over-the-counter products) are added. *  Please carry medication information at all times in case of emergency situations. These are general instructions for a healthy lifestyle:    No smoking/ No tobacco products/ Avoid exposure to second hand smoke  Surgeon General's Warning:  Quitting smoking now greatly reduces serious risk to your health. Obesity, smoking, and sedentary lifestyle greatly increases your risk for illness    A healthy diet, regular physical exercise & weight monitoring are important for maintaining a healthy lifestyle    You may be retaining fluid if you have a history of heart failure or if you experience any of the following symptoms:  Weight gain of 3 pounds or more overnight or 5 pounds in a week, increased swelling in our hands or feet or shortness of breath while lying flat in bed. Please call your doctor as soon as you notice any of these symptoms; do not wait until your next office visit. Recognize signs and symptoms of STROKE:    F-face looks uneven    A-arms unable to move or move unevenly    S-speech slurred or non-existent    T-time-call 911 as soon as signs and symptoms begin-DO NOT go       Back to bed or wait to see if you get better-TIME IS BRAIN. Warning Signs of HEART ATTACK     Call 911 if you have these symptoms:   Chest discomfort. Most heart attacks involve discomfort in the center of the chest that lasts more than a few minutes, or that goes away and comes back. It can feel like uncomfortable pressure, squeezing, fullness, or pain.  Discomfort in other areas of the upper body. Symptoms can include pain or discomfort in one or both arms, the back, neck, jaw, or stomach.  Shortness of breath with or without chest discomfort.  Other signs may include breaking out in a cold sweat, nausea, or lightheadedness. Don't wait more than five minutes to call 911 - MINUTES MATTER! Fast action can save your life. Calling 911 is almost always the fastest way to get lifesaving treatment. Emergency Medical Services staff can begin treatment when they arrive -- up to an hour sooner than if someone gets to the hospital by car. The discharge information has been reviewed with the patient. The patient verbalized understanding. Discharge medications reviewed with the patient and appropriate educational materials and side effects teaching were provided.   ___________________________________________________________________________________________________________________________________    Patient armband removed and shredded

## 2018-05-04 NOTE — DISCHARGE SUMMARY
Discharge Summary    Patient: Cornell Dunlap MRN: [id number]  CSN: [id number]    YOB: 1945  Age: 75 y.o. Sex: female    DOA: 5/1/2018 LOS:  LOS: 3 days   Discharge Date:      Primary Care Provider:  Deepthi Garcia MD    Admission Diagnoses: Elevated troponin    Discharge Diagnoses:    Problem List as of 5/4/2018  Date Reviewed: 5/1/2018          Codes Class Noted - Resolved    * (Principal)Elevated troponin ICD-10-CM: R74.8  ICD-9-CM: 790.6  5/1/2018 - Present        CAD (coronary artery disease) ICD-10-CM: I25.10  ICD-9-CM: 414.00  5/1/2018 - Present        ST elevation (STEMI) myocardial infarction involving right coronary artery ([facility] ICD-10-CM: I21.11  ICD-9-CM: 410.31  4/20/2016 - Present        COPD (chronic obstructive pulmonary disease) ([facility] ICD-10-CM: J44.9  ICD-9-CM: 496  4/20/2016 - Present        Benign essential HTN ICD-10-CM: I10  ICD-9-CM: 401.1  4/20/2016 - Present        Dyslipidemia, goal LDL below 70 ICD-10-CM: E78.5  ICD-9-CM: 272.4  4/20/2016 - Present              Discharge Medications:     Current Discharge Medication List      START taking these medications    Details   aspirin 81 mg chewable tablet Take 1 Tab by mouth daily. Qty: 30 Tab, Refills: 0      guaiFENesin ER (MUCINEX) 600 mg ER tablet Take 1 Tab by mouth every twelve (12) hours. Qty: 10 Tab, Refills: 0         CONTINUE these medications which have CHANGED    Details   amoxicillin-clavulanate (AUGMENTIN) 875-125 mg per tablet Take 1 Tab by mouth every twelve (12) hours for 5 days. Qty: 10 Tab, Refills: 0      losartan (COZAAR) 25 mg tablet Take 1 Tab by mouth two (2) times a day. Qty: 1 Tab, Refills: 0      metoprolol tartrate (LOPRESSOR) 25 mg tablet Take 0.5 Tabs by mouth every twelve (12) hours. Qty: 1 Tab, Refills: 0      spironolactone (ALDACTONE) 25 mg tablet Take 0.5 Tabs by mouth two (2) times a day.   Qty: 1 Tab, Refills: 0         CONTINUE these medications which have NOT CHANGED    Details fluticasone propionate (FLONASE NA) 2 Sprays by Nasal route. multivitamin (ONE A DAY) tablet Take 1 Tab by mouth daily. ascorbic acid, vitamin C, (VITAMIN C) 500 mg tablet Take 800 mg by mouth daily. atorvastatin (LIPITOR) 40 mg tablet Take 40 mg by mouth daily. budesonide-formoterol (SYMBICORT) 160-4.5 mcg/actuation HFAA Take 2 Puffs by inhalation two (2) times a day. clopidogrel (PLAVIX) 75 mg tablet Take 75 mg by mouth daily. pantoprazole (PROTONIX) 40 mg tablet Take 40 mg by mouth daily. omega-3 fatty acids-vitamin e (FISH OIL) 1,000 mg cap Take 1 Cap by mouth. Dosage is 1200mg/600mg once daily      albuterol (PROVENTIL HFA, VENTOLIN HFA, PROAIR HFA) 90 mcg/actuation inhaler Take 1 Puff by inhalation as needed for Wheezing. tiotropium (SPIRIVA) 18 mcg inhalation capsule Take 1 Cap by inhalation daily. loratadine (CLARITIN) 10 mg tablet Take 10 mg by mouth daily. allergy injection by SubCUTAneous route. Does not know dosage             Discharge Condition: Stable    Procedures : Left Heart Catheterization and Coronary Angiogram    Consults: Cardiology      PHYSICAL EXAM    Visit Vitals    /52    Pulse 98    Temp 98.4 °F (36.9 °C)    Resp 18    Ht 5' 5\" (1.651 m)    Wt 71.7 kg (158 lb 1.1 oz)    SpO2 93%    Breastfeeding No    BMI 26.3 kg/m2     General: Awake, cooperative, no acute distress    HEENT: NC, Atraumatic. PERRLA, EOMI. Anicteric sclerae. Lungs:  CTA Bilaterally. No Wheezing/Rhonchi/Rales. Heart:  Regular  rhythm,  No murmur, No Rubs, No Gallops  Abdomen: Soft, Non distended, Non tender. +Bowel sounds,   Extremities: No c/c/e. Left wrist immobilizer in place. Psych:   Not anxious or agitated. Neurologic:  No acute neurological deficits. Admission HPI : This is a 59-year-old lady who is followed by Dr. Elyse Pryor as an outpatient.   She has coronary disease and she had a stent placed after having had an MI in 2016. She also has COPD and is chronically on 24-hour oxygen. She has hypertension. She recently started taking Augmentin and Flonase 2 days ago for a cold. Prior to that for about 3 or 4 days she has been taking Yvonne-Boerne Plus over-the-counter for cold symptoms. She is hooked up to a Holter monitor that she has been on for about 14 days now, and this morning, the monitor notified her that her pulse was running high. At that point, she checked her own blood pressure and it was elevated, and it kept rising into the 094 systolic range, which is unusual for her. She had a brief moment of dizziness earlier in the morning, but that resolved quickly. She denies any episodes of chest pain, shortness of breath or syncopal symptoms. Her heart rate had been elevated, apparently on the monitor, at least 3 episodes this morning, and again elevated blood pressures, so she was advised to come to the emergency room. She received a dose of Lovenox here, and she is being admitted by the hospitalist service per the recommendation of Cardiology. Hospital Course : This patient was admitted to medical services on May 2, 2018 after evaluation in the ED revealed an elevated troponin. Cardiology was consulted and Dr Callie Rankin saw the patient. She had an echocardiogram with a preserved EF of 55-60%, and grade one diastolic dysfunction. She underwent a left heart catheterization and coronary angiogram without any stent placement. She did not have any chest pain throughout the duration of her hospitalization. After the catheterization she remained stable with no signs of hematoma formation at the site of entry. She will be discharged on her current medication regimen and will have outpatient follow up with cardiology. She was instructed to follow up with her PCP as well. CDMP - NSTEMI as noted on cath report    Activity: Activity as tolerated    Diet: Cardiac Diet    Follow-up: PCP; Cardiology.     Disposition: Home, stable condition. Minutes spent on discharge: 35       Labs: Results:       Chemistry Recent Labs      05/04/18 0318 05/03/18 0447 05/01/18   2320  05/01/18   1418   GLU  99  89  125*  100*   NA  142  141  144  144   K  3.8  4.3  3.8  3.8   CL  105  106  106  104   CO2  28  30  26  31   BUN  14  13  14  15   CREA  0.74  0.89  0.73  0.77   CA  8.5  8.8  8.7  8.7   AGAP  9  5  12  9   BUCR  19  15  19  19   AP   --    --    --   68   TP   --    --    --   7.0   ALB   --    --    --   4.0   GLOB   --    --    --   3.0   AGRAT   --    --    --   1.3      CBC w/Diff Recent Labs      05/04/18 0318 05/03/18 0447 05/01/18 2320 05/01/18   1418   WBC  6.5  4.7  7.0  6.1   RBC  3.67*  3.49*  3.79*  3.72*   HGB  11.0*  10.3*  11.3*  11.1*   HCT  33.9*  32.6*  34.8*  34.4*   PLT  248  230  248  228   GRANS   --    --    --   73   LYMPH   --    --    --   17*   EOS   --    --    --   1      Cardiac Enzymes Recent Labs      05/02/18 0405 05/01/18   2320   CPK  70  77   CKND1  2.6  2.5      Coagulation No results for input(s): PTP, INR, APTT in the last 72 hours. No lab exists for component: INREXT    Lipid Panel Lab Results   Component Value Date/Time    Cholesterol, total 148 04/21/2016 05:00 AM    HDL Cholesterol 53 04/21/2016 05:00 AM    LDL, calculated 75.6 04/21/2016 05:00 AM    VLDL, calculated 19.4 04/21/2016 05:00 AM    Triglyceride 97 04/21/2016 05:00 AM    CHOL/HDL Ratio 2.8 04/21/2016 05:00 AM      BNP No results for input(s): BNPP in the last 72 hours. Liver Enzymes Recent Labs      05/01/18   1418   TP  7.0   ALB  4.0   AP  68   SGOT  17      Thyroid Studies No results found for: T4, T3U, TSH, TSHEXT         Significant Diagnostic Studies: Xr Chest Port    Result Date: 5/1/2018  EXAM: One-view chest CLINICAL HISTORY: Hypertension , COMPARISON: None FINDINGS: Frontal view of the chest demonstrate clear lungs. Cardiac silhouette is normal in size and contour.  No acute bony or soft tissue abnormality. IMPRESSION: No acute pulmonary process identified. No results found for this or any previous visit.         CC: Nikki Wilson MD

## 2018-05-04 NOTE — PROGRESS NOTES
Problem: Falls - Risk of  Goal: *Absence of Falls  Document Devendra Fall Risk and appropriate interventions in the flowsheet.    Outcome: Progressing Towards Goal  Fall Risk Interventions:            Medication Interventions: Bed/chair exit alarm         History of Falls Interventions: Consult care management for discharge planning, Room close to nurse's station

## 2019-06-14 ENCOUNTER — HOSPITAL ENCOUNTER (EMERGENCY)
Age: 74
Discharge: HOME | End: 2019-06-14
Attending: EMERGENCY MEDICINE
Payer: MEDICARE

## 2019-06-14 ENCOUNTER — APPOINTMENT (OUTPATIENT)
Dept: GENERAL RADIOLOGY | Age: 74
End: 2019-06-14
Attending: EMERGENCY MEDICINE
Payer: MEDICARE

## 2019-06-14 VITALS
BODY MASS INDEX: 27.97 KG/M2 | TEMPERATURE: 98.6 F | HEART RATE: 90 BPM | HEIGHT: 66 IN | RESPIRATION RATE: 13 BRPM | SYSTOLIC BLOOD PRESSURE: 127 MMHG | DIASTOLIC BLOOD PRESSURE: 51 MMHG | WEIGHT: 174 LBS | OXYGEN SATURATION: 98 %

## 2019-06-14 DIAGNOSIS — J44.9 CHRONIC OBSTRUCTIVE PULMONARY DISEASE, UNSPECIFIED COPD TYPE (HCC): ICD-10-CM

## 2019-06-14 DIAGNOSIS — R07.9 CHEST PAIN, UNSPECIFIED TYPE: Primary | ICD-10-CM

## 2019-06-14 DIAGNOSIS — K21.9 GASTROESOPHAGEAL REFLUX DISEASE WITHOUT ESOPHAGITIS: ICD-10-CM

## 2019-06-14 LAB
ALBUMIN SERPL-MCNC: 3.9 G/DL (ref 3.4–5)
ALBUMIN/GLOB SERPL: 1.4 {RATIO} (ref 0.8–1.7)
ALP SERPL-CCNC: 61 U/L (ref 45–117)
ALT SERPL-CCNC: 19 U/L (ref 13–56)
ANION GAP SERPL CALC-SCNC: 1 MMOL/L (ref 3–18)
APPEARANCE UR: CLEAR
AST SERPL-CCNC: 16 U/L (ref 15–37)
BASOPHILS # BLD: 0 K/UL (ref 0–0.1)
BASOPHILS NFR BLD: 0 % (ref 0–2)
BILIRUB SERPL-MCNC: 0.3 MG/DL (ref 0.2–1)
BILIRUB UR QL: NEGATIVE
BUN SERPL-MCNC: 10 MG/DL (ref 7–18)
BUN/CREAT SERPL: 14 (ref 12–20)
CALCIUM SERPL-MCNC: 9 MG/DL (ref 8.5–10.1)
CHLORIDE SERPL-SCNC: 105 MMOL/L (ref 100–108)
CK MB CFR SERPL CALC: 1.5 % (ref 0–4)
CK MB CFR SERPL CALC: 1.6 % (ref 0–4)
CK MB SERPL-MCNC: 1 NG/ML (ref 5–25)
CK MB SERPL-MCNC: 1.1 NG/ML (ref 5–25)
CK SERPL-CCNC: 68 U/L (ref 26–192)
CK SERPL-CCNC: 70 U/L (ref 26–192)
CO2 SERPL-SCNC: 35 MMOL/L (ref 21–32)
COLOR UR: YELLOW
CREAT SERPL-MCNC: 0.74 MG/DL (ref 0.6–1.3)
D DIMER PPP FEU-MCNC: 0.41 UG/ML(FEU)
DIFFERENTIAL METHOD BLD: ABNORMAL
EOSINOPHIL # BLD: 0.1 K/UL (ref 0–0.4)
EOSINOPHIL NFR BLD: 1 % (ref 0–5)
ERYTHROCYTE [DISTWIDTH] IN BLOOD BY AUTOMATED COUNT: 13.1 % (ref 11.6–14.5)
GLOBULIN SER CALC-MCNC: 2.8 G/DL (ref 2–4)
GLUCOSE SERPL-MCNC: 138 MG/DL (ref 74–99)
GLUCOSE UR STRIP.AUTO-MCNC: NEGATIVE MG/DL
HCT VFR BLD AUTO: 32.6 % (ref 35–45)
HGB BLD-MCNC: 10.3 G/DL (ref 12–16)
HGB UR QL STRIP: NEGATIVE
KETONES UR QL STRIP.AUTO: NEGATIVE MG/DL
LEUKOCYTE ESTERASE UR QL STRIP.AUTO: NEGATIVE
LIPASE SERPL-CCNC: 134 U/L (ref 73–393)
LYMPHOCYTES # BLD: 1.1 K/UL (ref 0.9–3.6)
LYMPHOCYTES NFR BLD: 20 % (ref 21–52)
MCH RBC QN AUTO: 29.2 PG (ref 24–34)
MCHC RBC AUTO-ENTMCNC: 31.6 G/DL (ref 31–37)
MCV RBC AUTO: 92.4 FL (ref 74–97)
MONOCYTES # BLD: 0.4 K/UL (ref 0.05–1.2)
MONOCYTES NFR BLD: 7 % (ref 3–10)
NEUTS SEG # BLD: 4.2 K/UL (ref 1.8–8)
NEUTS SEG NFR BLD: 72 % (ref 40–73)
NITRITE UR QL STRIP.AUTO: NEGATIVE
PH UR STRIP: 7.5 [PH] (ref 5–8)
PLATELET # BLD AUTO: 265 K/UL (ref 135–420)
PMV BLD AUTO: 9.2 FL (ref 9.2–11.8)
POTASSIUM SERPL-SCNC: 3.7 MMOL/L (ref 3.5–5.5)
PROT SERPL-MCNC: 6.7 G/DL (ref 6.4–8.2)
PROT UR STRIP-MCNC: NEGATIVE MG/DL
RBC # BLD AUTO: 3.53 M/UL (ref 4.2–5.3)
SODIUM SERPL-SCNC: 141 MMOL/L (ref 136–145)
SP GR UR REFRACTOMETRY: 1.01 (ref 1–1.03)
TROPONIN I SERPL-MCNC: <0.02 NG/ML (ref 0–0.04)
TROPONIN I SERPL-MCNC: <0.02 NG/ML (ref 0–0.04)
UROBILINOGEN UR QL STRIP.AUTO: 0.2 EU/DL (ref 0.2–1)
WBC # BLD AUTO: 5.8 K/UL (ref 4.6–13.2)

## 2019-06-14 PROCEDURE — 81003 URINALYSIS AUTO W/O SCOPE: CPT

## 2019-06-14 PROCEDURE — 94640 AIRWAY INHALATION TREATMENT: CPT

## 2019-06-14 PROCEDURE — 74011250636 HC RX REV CODE- 250/636: Performed by: EMERGENCY MEDICINE

## 2019-06-14 PROCEDURE — 77030029684 HC NEB SM VOL KT MONA -A

## 2019-06-14 PROCEDURE — 80053 COMPREHEN METABOLIC PANEL: CPT

## 2019-06-14 PROCEDURE — 74011000250 HC RX REV CODE- 250: Performed by: EMERGENCY MEDICINE

## 2019-06-14 PROCEDURE — 93005 ELECTROCARDIOGRAM TRACING: CPT

## 2019-06-14 PROCEDURE — 83690 ASSAY OF LIPASE: CPT

## 2019-06-14 PROCEDURE — 99285 EMERGENCY DEPT VISIT HI MDM: CPT

## 2019-06-14 PROCEDURE — 96374 THER/PROPH/DIAG INJ IV PUSH: CPT

## 2019-06-14 PROCEDURE — 85379 FIBRIN DEGRADATION QUANT: CPT

## 2019-06-14 PROCEDURE — 71045 X-RAY EXAM CHEST 1 VIEW: CPT

## 2019-06-14 PROCEDURE — 82550 ASSAY OF CK (CPK): CPT

## 2019-06-14 PROCEDURE — 85025 COMPLETE CBC W/AUTO DIFF WBC: CPT

## 2019-06-14 RX ORDER — MAG HYDROX/ALUMINUM HYD/SIMETH 200-200-20
30 SUSPENSION, ORAL (FINAL DOSE FORM) ORAL
Qty: 354 ML | Refills: 0 | Status: SHIPPED | OUTPATIENT
Start: 2019-06-14

## 2019-06-14 RX ORDER — PREDNISONE 20 MG/1
40 TABLET ORAL DAILY
Qty: 10 TAB | Refills: 0 | Status: SHIPPED | OUTPATIENT
Start: 2019-06-14 | End: 2019-06-19

## 2019-06-14 RX ORDER — IPRATROPIUM BROMIDE AND ALBUTEROL SULFATE 2.5; .5 MG/3ML; MG/3ML
3 SOLUTION RESPIRATORY (INHALATION)
Status: COMPLETED | OUTPATIENT
Start: 2019-06-14 | End: 2019-06-14

## 2019-06-14 RX ORDER — ALBUTEROL SULFATE 90 UG/1
2 AEROSOL, METERED RESPIRATORY (INHALATION)
Qty: 1 INHALER | Refills: 0 | Status: SHIPPED | OUTPATIENT
Start: 2019-06-14

## 2019-06-14 RX ORDER — PANTOPRAZOLE SODIUM 40 MG/1
40 TABLET, DELAYED RELEASE ORAL DAILY
Qty: 20 TAB | Refills: 0 | Status: SHIPPED | OUTPATIENT
Start: 2019-06-14 | End: 2019-07-04

## 2019-06-14 RX ADMIN — IPRATROPIUM BROMIDE AND ALBUTEROL SULFATE 3 ML: .5; 3 SOLUTION RESPIRATORY (INHALATION) at 15:30

## 2019-06-14 RX ADMIN — METHYLPREDNISOLONE SODIUM SUCCINATE 125 MG: 125 INJECTION, POWDER, FOR SOLUTION INTRAMUSCULAR; INTRAVENOUS at 15:30

## 2019-06-14 NOTE — ED NOTES
EMERGENCY DEPARTMENT HISTORY AND PHYSICAL EXAM    Date: 6/14/2019  Patient Name: Fransisco Cardenas    History of Presenting Illness     Chief Complaint   Patient presents with    Chest Pain         History Provided By: Patient    Chief Complaint: chest pain  Duration: Days  Timing:  Acute  Location: Chest  Quality: Sharp  Severity: 7 out of 10  Modifying Factors: Movement breathing  Associated Symptoms: SOB    Additional History (Context):   3:12 PM  Fransisco Cardenas is a 75 y.o. female with PMHX of CAD, COPD who presents to the emergency department C/O chest pain. Associated sxs include SOB. Pt denies fevers, vomiting or abdominal pain, and any other sxs or complaints. Patient has a history of COPD followed by Dr. Jordan Robbins pulmonology at [facility]. She was seen by Dr. 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 She was given antibiotic and a fungal mouthwash which was stopped a week ago. She had improvement. Monday, she had endoscopy with Dr. Jameson Phillip. She had upper and lower endoscopy with biopsies of her stomach. On the following day on Tuesday, 3 days ago she was with her daughter who is having a baby on 7 PM at night and she had sudden onset of lower precordial chest pain which wrapped around her back lasted for about 5 seconds associated with shortness of breath. .  Pain was worse with movement and breathing. Pain resolved on its own. Since then, she has had intermittent episodes of pain and now presents with recurrent pain for evaluation. PCP: Luz Johnson MD    Current Outpatient Medications   Medication Sig Dispense Refill    pantoprazole (PROTONIX) 40 mg tablet Take 1 Tab by mouth daily for 20 days. 20 Tab 0    albuterol (PROVENTIL HFA, VENTOLIN HFA, PROAIR HFA) 90 mcg/actuation inhaler Take 2 Puffs by inhalation every four (4) hours as needed for Wheezing. 1 Inhaler 0    predniSONE (DELTASONE) 20 mg tablet Take 40 mg by mouth daily for 5 days.  10 Tab 0  alum-mag hydroxide-simeth (MYLANTA) 200-200-20 mg/5 mL susp Take 30 mL by mouth three (3) times daily (with meals). 354 mL 0    losartan (COZAAR) 25 mg tablet Take 1 Tab by mouth two (2) times a day. 1 Tab 0    metoprolol tartrate (LOPRESSOR) 25 mg tablet Take 0.5 Tabs by mouth every twelve (12) hours. 1 Tab 0    spironolactone (ALDACTONE) 25 mg tablet Take 0.5 Tabs by mouth two (2) times a day. 1 Tab 0    fluticasone propionate (FLONASE NA) 2 Sprays by Nasal route.  atorvastatin (LIPITOR) 40 mg tablet Take 40 mg by mouth daily.  budesonide-formoterol (SYMBICORT) 160-4.5 mcg/actuation HFAA Take 2 Puffs by inhalation two (2) times a day.  clopidogrel (PLAVIX) 75 mg tablet Take 75 mg by mouth daily.  pantoprazole (PROTONIX) 40 mg tablet Take 40 mg by mouth daily.  albuterol (PROVENTIL HFA, VENTOLIN HFA, PROAIR HFA) 90 mcg/actuation inhaler Take 1 Puff by inhalation as needed for Wheezing.  tiotropium (SPIRIVA) 18 mcg inhalation capsule Take 1 Cap by inhalation daily.  loratadine (CLARITIN) 10 mg tablet Take 10 mg by mouth daily.  multivitamin (ONE A DAY) tablet Take 1 Tab by mouth daily.  omega-3 fatty acids-vitamin e (FISH OIL) 1,000 mg cap Take 1 Cap by mouth. Dosage is 1200mg/600mg once daily         Past History     Past Medical History:  Past Medical History:   Diagnosis Date    Asthma     CAD (coronary artery disease)     MI    Cancer ([facility] [address]     Cataract     LEFT and RIGHT    Chronic obstructive pulmonary disease (HCC)     Diverticulitis     Emphysema lung (HCC)     Hypertension     Melanoma ([facility] [address]        Past Surgical History:  Past Surgical History:   Procedure Laterality Date    CARDIAC SURG PROCEDURE UNLIST      04/16 Stent    HX GYN      HX HYSTERECTOMY      HX ORTHOPAEDIC      cervical, lumbar       Family History:  History reviewed. No pertinent family history.     Social History:  Social History     Tobacco Use    Smoking status: Former Smoker    Smokeless tobacco: Never Used   Substance Use Topics    Alcohol use: Yes     Comment: rarely    Drug use: No       Allergies: Allergies   Allergen Reactions    Lexapro [Escitalopram] Rash    Zoloft [Sertraline] Rash         Review of Systems   Review of Systems   Constitutional: Negative for activity change, appetite change, fever and unexpected weight change. HENT: Negative for congestion and sore throat. Eyes: Negative for pain and redness. Respiratory: Negative for cough, shortness of breath and wheezing. Cardiovascular: Positive for chest pain. Negative for palpitations. Gastrointestinal: Negative for abdominal pain, diarrhea, nausea and vomiting. Endocrine: Negative for polydipsia and polyuria. Genitourinary: Negative for difficulty urinating and dysuria. Musculoskeletal: Negative for back pain and neck pain. Skin: Negative for pallor and rash. Neurological: Negative for headaches. All other systems reviewed and are negative. Physical Exam     Vitals:    06/14/19 1800 06/14/19 1830 06/14/19 1900 06/14/19 1930   BP: 133/57 125/60 133/58 127/51   Pulse: 89 87 93 90   Resp: (!) 32 11 16 13   Temp:       SpO2: 99% 99% 99% 98%   Weight:       Height:         Physical Exam   Constitutional: She is oriented to person, place, and time. She appears well-developed and well-nourished. HENT:   Head: Normocephalic and atraumatic. Right Ear: External ear normal.   Left Ear: External ear normal.   Nose: Nose normal.   Mouth/Throat: Oropharynx is clear and moist.   Eyes: Pupils are equal, round, and reactive to light. Conjunctivae and EOM are normal.   Neck: Normal range of motion. Neck supple. No JVD present. No tracheal deviation present. Cardiovascular: Normal rate, regular rhythm, normal heart sounds and intact distal pulses. Exam reveals no gallop and no friction rub. No murmur heard. Pulmonary/Chest: Effort normal. No respiratory distress. She has no wheezes.  She has no rales. Coarse breath sounds bilaterally   Abdominal: Soft. Bowel sounds are normal. She exhibits no distension and no mass. There is tenderness (epigastric tenderness). There is no rebound and no guarding. Musculoskeletal: Normal range of motion. She exhibits no edema or tenderness. Neurological: She is alert and oriented to person, place, and time. She has normal reflexes. No cranial nerve deficit. Skin: Skin is warm and dry. No rash noted. Psychiatric: She has a normal mood and affect. Her behavior is normal.   Nursing note and vitals reviewed. Diagnostic Study Results     Labs -     Recent Results (from the past 24 hour(s))   EKG, 12 LEAD, INITIAL    Collection Time: 06/14/19  2:55 PM   Result Value Ref Range    Ventricular Rate 81 BPM    Atrial Rate 81 BPM    P-R Interval 160 ms    QRS Duration 90 ms    Q-T Interval 364 ms    QTC Calculation (Bezet) 422 ms    Calculated P Axis 85 degrees    Calculated R Axis 38 degrees    Calculated T Axis 63 degrees    Diagnosis       Sinus rhythm with premature atrial complexes  Otherwise normal ECG  When compared with ECG of 01-MAY-2018 20:13,  premature atrial complexes are now present     CBC WITH AUTOMATED DIFF    Collection Time: 06/14/19  3:05 PM   Result Value Ref Range    WBC 5.8 4.6 - 13.2 K/uL    RBC 3.53 (L) 4.20 - 5.30 M/uL    HGB 10.3 (L) 12.0 - 16.0 g/dL    HCT 32.6 (L) 35.0 - 45.0 %    MCV 92.4 74.0 - 97.0 FL    MCH 29.2 24.0 - 34.0 PG    MCHC 31.6 31.0 - 37.0 g/dL    RDW 13.1 11.6 - 14.5 %    PLATELET 475 364 - 613 K/uL    MPV 9.2 9.2 - 11.8 FL    NEUTROPHILS 72 40 - 73 %    LYMPHOCYTES 20 (L) 21 - 52 %    MONOCYTES 7 3 - 10 %    EOSINOPHILS 1 0 - 5 %    BASOPHILS 0 0 - 2 %    ABS. NEUTROPHILS 4.2 1.8 - 8.0 K/UL    ABS. LYMPHOCYTES 1.1 0.9 - 3.6 K/UL    ABS. MONOCYTES 0.4 0.05 - 1.2 K/UL    ABS. EOSINOPHILS 0.1 0.0 - 0.4 K/UL    ABS.  BASOPHILS 0.0 0.0 - 0.1 K/UL    DF AUTOMATED     D DIMER    Collection Time: 06/14/19  3:05 PM   Result Value Ref Range    D DIMER 0.41 <0.46 ug/ml(FEU)   METABOLIC PANEL, COMPREHENSIVE    Collection Time: 06/14/19  3:05 PM   Result Value Ref Range    Sodium 141 136 - 145 mmol/L    Potassium 3.7 3.5 - 5.5 mmol/L    Chloride 105 100 - 108 mmol/L    CO2 35 (H) 21 - 32 mmol/L    Anion gap 1 (L) 3.0 - 18 mmol/L    Glucose 138 (H) 74 - 99 mg/dL    BUN 10 7.0 - 18 MG/DL    Creatinine 0.74 0.6 - 1.3 MG/DL    BUN/Creatinine ratio 14 12 - 20      GFR est AA >60 >60 ml/min/1.73m2    GFR est non-AA >60 >60 ml/min/1.73m2    Calcium 9.0 8.5 - 10.1 MG/DL    Bilirubin, total 0.3 0.2 - 1.0 MG/DL    ALT (SGPT) 19 13 - 56 U/L    AST (SGOT) 16 15 - 37 U/L    Alk.  phosphatase 61 45 - 117 U/L    Protein, total 6.7 6.4 - 8.2 g/dL    Albumin 3.9 3.4 - 5.0 g/dL    Globulin 2.8 2.0 - 4.0 g/dL    A-G Ratio 1.4 0.8 - 1.7     LIPASE    Collection Time: 06/14/19  3:05 PM   Result Value Ref Range    Lipase 134 73 - 393 U/L   CARDIAC PANEL,(CK, CKMB & TROPONIN)    Collection Time: 06/14/19  3:05 PM   Result Value Ref Range    CK 70 26 - 192 U/L    CK - MB 1.1 <3.6 ng/ml    CK-MB Index 1.6 0.0 - 4.0 %    Troponin-I, QT <0.02 0.0 - 0.045 NG/ML   URINALYSIS W/ RFLX MICROSCOPIC    Collection Time: 06/14/19  3:35 PM   Result Value Ref Range    Color YELLOW      Appearance CLEAR      Specific gravity 1.007 1.003 - 1.030      pH (UA) 7.5 5.0 - 8.0      Protein NEGATIVE  NEG mg/dL    Glucose NEGATIVE  NEG mg/dL    Ketone NEGATIVE  NEG mg/dL    Bilirubin NEGATIVE  NEG      Blood NEGATIVE  NEG      Urobilinogen 0.2 0.2 - 1.0 EU/dL    Nitrites NEGATIVE  NEG      Leukocyte Esterase NEGATIVE  NEG     EKG, 12 LEAD, SUBSEQUENT    Collection Time: 06/14/19  5:24 PM   Result Value Ref Range    Ventricular Rate 80 BPM    Atrial Rate 80 BPM    P-R Interval 160 ms    QRS Duration 92 ms    Q-T Interval 378 ms    QTC Calculation (Bezet) 435 ms    Calculated P Axis 69 degrees    Calculated R Axis 35 degrees    Calculated T Axis 58 degrees    Diagnosis       Sinus rhythm with marked sinus arrhythmia  Otherwise normal ECG  When compared with ECG of 14-JUN-2019 14:55,  premature atrial complexes are no longer present         Radiologic Studies -  XR CHEST PORT   Final Result   IMPRESSION:      No acute radiographic cardiopulmonary abnormality. CT Results  (Last 48 hours)    None        CXR Results  (Last 48 hours)    None          Medications given in the ED-  Medications   albuterol-ipratropium (DUO-NEB) 2.5 MG-0.5 MG/3 ML (3 mL Nebulization Given 6/14/19 1530)   methylPREDNISolone (PF) (Solu-MEDROL) injection 125 mg (125 mg IntraVENous Given 6/14/19 1530)         Medical Decision Making   I am the first provider for this patient. I reviewed the vital signs, available nursing notes, past medical history, past surgical history, family history and social history. Vital Signs-Reviewed the patient's vital signs. EKG interpretation: (Preliminary)  14:55  Normal sinus rhythm at 81 with normal axis normal intervals and no ST-T wave changes suggestive of ischemia  17:24  NSR @ 80 with normal axis normal intervals and no ST-T wave changes suggestive of ischemia  ECG read by Isaias Valdovinos MD    Records Reviewed: Nursing Notes    Provider Notes (Medical Decision Making):   DDx-acute coronary syndrome, PE, GERD, pancreatitis, COPD, pneumonia    Procedures:  Procedures    ED Course:   Initial assessment performed. The patients presenting problems have been discussed, and they are in agreement with the care plan formulated and outlined with them. I have encouraged them to ask questions as they arise throughout their visit. 18:30  Patient feels better with resolution of chest pain. Patient chest pain resolved after DuoNeb, solumedrol and GI cocktail. 19:50  Case discussed with Dr. Greg Rodriguez cardiology who recommends outpatient follow-up. Patient has no evidence of acute coronary syndrome at this time. Her chest pain is resolved.       Diagnosis and Disposition DISCUSSION:  The patient was stable in the emergency department. She had residual chest discomfort which resolved after GI cocktail DuoNeb and Solu-Medrol. Serial ECGs and troponins showed no evidence of acute coronary syndrome. D-dimer was normal, doubt PE. Chest x-ray was unremarkable. Labs unremarkable. Case was discussed with Dr. Salbador Grayson from cardiology who recommends outpatient follow-up for stress testing. Patient advised to return to the emergency department if she develops recurrent pain, shortness of breath, fevers or vomiting. Patient was given prescriptions for Protonix, albuterol, prednisone and Mylanta. DISCHARGE NOTE:  Tyler Chandra's  results have been reviewed with her. She has been counseled regarding her diagnosis, treatment, and plan. She verbally conveys understanding and agreement of the signs, symptoms, diagnosis, treatment and prognosis and additionally agrees to follow up as discussed. She also agrees with the care-plan and conveys that all of her questions have been answered. I have also provided discharge instructions for her that include: educational information regarding their diagnosis and treatment, and list of reasons why they would want to return to the ED prior to their follow-up appointment, should her condition change. She has been provided with education for proper emergency department utilization. CLINICAL IMPRESSION:    1. Chest pain, unspecified type    2. Chronic obstructive pulmonary disease, unspecified COPD type ([address]    3. Gastroesophageal reflux disease without esophagitis        PLAN:  1. D/C Home  2. Discharge Medication List as of 6/14/2019  8:06 PM      START taking these medications    Details   ! ! pantoprazole (PROTONIX) 40 mg tablet Take 1 Tab by mouth daily for 20 days. , Print, Disp-20 Tab, R-0      !! albuterol (PROVENTIL HFA, VENTOLIN HFA, PROAIR HFA) 90 mcg/actuation inhaler Take 2 Puffs by inhalation every four (4) hours as needed for Wheezing., Print, Disp-1 Inhaler, R-0      predniSONE (DELTASONE) 20 mg tablet Take 40 mg by mouth daily for 5 days. , Print, Disp-10 Tab, R-0      alum-mag hydroxide-simeth (MYLANTA) 200-200-20 mg/5 mL susp Take 30 mL by mouth three (3) times daily (with meals). , Print, Disp-354 mL, R-0       !! - Potential duplicate medications found. Please discuss with provider. CONTINUE these medications which have NOT CHANGED    Details   losartan (COZAAR) 25 mg tablet Take 1 Tab by mouth two (2) times a day., No Print, Disp-1 Tab, R-0      metoprolol tartrate (LOPRESSOR) 25 mg tablet Take 0.5 Tabs by mouth every twelve (12) hours. , No Print, Disp-1 Tab, R-0      spironolactone (ALDACTONE) 25 mg tablet Take 0.5 Tabs by mouth two (2) times a day., No Print, Disp-1 Tab, R-0      fluticasone propionate (FLONASE NA) 2 Sprays by Nasal route., Historical Med      atorvastatin (LIPITOR) 40 mg tablet Take 40 mg by mouth daily. , Historical Med      budesonide-formoterol (SYMBICORT) 160-4.5 mcg/actuation HFAA Take 2 Puffs by inhalation two (2) times a day., Historical Med      clopidogrel (PLAVIX) 75 mg tablet Take 75 mg by mouth daily. , Historical Med      !! pantoprazole (PROTONIX) 40 mg tablet Take 40 mg by mouth daily. , Historical Med      !! albuterol (PROVENTIL HFA, VENTOLIN HFA, PROAIR HFA) 90 mcg/actuation inhaler Take 1 Puff by inhalation as needed for Wheezing., Historical Med      tiotropium (SPIRIVA) 18 mcg inhalation capsule Take 1 Cap by inhalation daily. , Historical Med      loratadine (CLARITIN) 10 mg tablet Take 10 mg by mouth daily. , Historical Med      multivitamin (ONE A DAY) tablet Take 1 Tab by mouth daily. , Historical Med      omega-3 fatty acids-vitamin e (FISH OIL) 1,000 mg cap Take 1 Cap by mouth. Dosage is 1200mg/600mg once daily, Historical Med       !! - Potential duplicate medications found. Please discuss with provider.         3.   Follow-up Information     Follow up With Specialties Details Why Contact Info    Eliza Rivas MD Internal Medicine Call in 2 days For further evaluation [id number] [place]      Americo Tomas MD Cardiology, Internal Medicine Call in 3 days For further evaluation [address] [place] [address]  [phone]                Please note that this dictation was completed with [employer], the computer voice recognition software. Quite often unanticipated grammatical, syntax, homophones, and other interpretive errors are inadvertently transcribed by the computer software. Please disregard these errors. Please excuse any errors that have escaped final proofreading.

## 2019-06-14 NOTE — ED TRIAGE NOTES
Pt arrived per EMS with c/o of intermittent CP since Tuesday. Pt reports the pain is in bilateral lower ribs radiating to back. Pt has hx of COPD but with \"some increased SOB'.

## 2019-06-15 NOTE — DISCHARGE INSTRUCTIONS
Patient Education        Chest Pain: Care Instructions  Your Care Instructions    There are many things that can cause chest pain. Some are not serious and will get better on their own in a few days. But some kinds of chest pain need more testing and treatment. Your doctor may have recommended a follow-up visit in the next 8 to 12 hours. If you are not getting better, you may need more tests or treatment. Even though your doctor has released you, you still need to watch for any problems. The doctor carefully checked you, but sometimes problems can develop later. If you have new symptoms or if your symptoms do not get better, get medical care right away. If you have worse or different chest pain or pressure that lasts more than 5 minutes or you passed out (lost consciousness), call 911 or seek other emergency help right away. A medical visit is only one step in your treatment. Even if you feel better, you still need to do what your doctor recommends, such as going to all suggested follow-up appointments and taking medicines exactly as directed. This will help you recover and help prevent future problems. How can you care for yourself at home? · Rest until you feel better. · Take your medicine exactly as prescribed. Call your doctor if you think you are having a problem with your medicine. · Do not drive after taking a prescription pain medicine. When should you call for help? Call 911 if:    · You passed out (lost consciousness).     · You have severe difficulty breathing.     · You have symptoms of a heart attack. These may include:  ? Chest pain or pressure, or a strange feeling in your chest.  ? Sweating. ? Shortness of breath. ? Nausea or vomiting. ? Pain, pressure, or a strange feeling in your back, neck, jaw, or upper belly or in one or both shoulders or arms. ? Lightheadedness or sudden weakness. ? A fast or irregular heartbeat.   After you call 911, the  may tell you to chew 1 adult-strength or 2 to 4 low-dose aspirin. Wait for an ambulance. Do not try to drive yourself.    Call your doctor today if:    · You have any trouble breathing.     · Your chest pain gets worse.     · You are dizzy or lightheaded, or you feel like you may faint.     · You are not getting better as expected.     · You are having new or different chest pain. Where can you learn more? Go to http://devendra-adam.info/. Enter A120 in the search box to learn more about \"Chest Pain: Care Instructions. \"  Current as of: September 23, 2018  Content Version: 11.9  © 0547-6512 [employer]. Care instructions adapted under license by [employer] (which disclaims liability or warranty for this information). If you have questions about a medical condition or this instruction, always ask your healthcare professional. [place] [address] any warranty or liability for your use of this information. Patient Education        Chronic Obstructive Pulmonary Disease (COPD) Flare-Ups: Care Instructions  Your Care Instructions    Chronic obstructive pulmonary disease (COPD) is a lung disease that makes it hard to breathe. It is caused by damage to the lungs over many years, usually from smoking. COPD is often a mix of two diseases:  · Chronic bronchitis: The airways that carry air to the lungs (bronchial tubes) get inflamed and make a lot of mucus. This can narrow or block the airways. · Emphysema: In a healthy person, the tiny air sacs in the lungs are like balloons. As you breathe in and out, they get bigger and smaller to move air through your lungs. But with emphysema, these air sacs are damaged and lose their stretch. Less air gets in and out of the lungs. Many people with COPD have attacks called flare-ups or exacerbations. This is when your usual symptoms quickly get worse and stay worse. The doctor has checked you carefully. But problems can develop later.  If you notice any problems or new symptoms, get medical treatment right away. Follow-up care is a key part of your treatment and safety. Be sure to make and go to all appointments, and call your doctor if you are having problems. It's also a good idea to know your test results and keep a list of the medicines you take. How can you care for yourself at home? · Be safe with medicines. Take your medicines exactly as prescribed. Call your doctor if you think you are having a problem with your medicine. You may be taking medicines such as:  ? Bronchodilators. These help open your airways and make breathing easier. ? Corticosteroids. These reduce airway inflammation. They may be given as pills, in a vein, or in an inhaled form. You may go home with pills in addition to an inhaler that you already use. · A spacer may help you get more inhaled medicine to your lungs. Ask your doctor or pharmacist if a spacer is right for you. If it is, ask how to use it properly. · If your doctor prescribed antibiotics, take them as directed. Do not stop taking them just because you feel better. You need to take the full course of antibiotics. · If your doctor prescribed oxygen, use the flow rate your doctor has recommended. Do not change it without talking to your doctor first.  · Do not smoke. Smoking makes COPD worse. If you need help quitting, talk to your doctor about stop-smoking programs and medicines. These can increase your chances of quitting for good. When should you call for help? Call 911 anytime you think you may need emergency care.  For example, call if:    · You have severe trouble breathing.    Call your doctor now or seek immediate medical care if:    · You have new or worse trouble breathing.     · Your coughing or wheezing gets worse.     · You cough up dark brown or bloody mucus (sputum).     · You have a new or higher fever.    Watch closely for changes in your health, and be sure to contact your doctor if:    · You notice more mucus or a change in the color of your mucus.     · You need to use your antibiotic or steroid pills.     · You do not get better as expected. Where can you learn more? Go to http://devendra-adam.info/. Enter V803 in the search box to learn more about \"Chronic Obstructive Pulmonary Disease (COPD) Flare-Ups: Care Instructions. \"  Current as of: September 5, 2018  Content Version: 11.9  © 6600-5352 [employer]. Care instructions adapted under license by [employer] (which disclaims liability or warranty for this information). If you have questions about a medical condition or this instruction, always ask your healthcare professional. [place] [address] any warranty or liability for your use of this information. Patient Education        Gastroesophageal Reflux Disease (GERD): Care Instructions  Your Care Instructions    Gastroesophageal reflux disease (GERD) is the backward flow of stomach acid into the esophagus. The esophagus is the tube that leads from your throat to your stomach. A one-way valve prevents the stomach acid from moving up into this tube. When you have GERD, this valve does not close tightly enough. If you have mild GERD symptoms including heartburn, you may be able to control the problem with antacids or over-the-counter medicine. Changing your diet, losing weight, and making other lifestyle changes can also help reduce symptoms. Follow-up care is a key part of your treatment and safety. Be sure to make and go to all appointments, and call your doctor if you are having problems. It's also a good idea to know your test results and keep a list of the medicines you take. How can you care for yourself at home? · Take your medicines exactly as prescribed. Call your doctor if you think you are having a problem with your medicine. · Your doctor may recommend over-the-counter medicine.  For mild or occasional indigestion, antacids, such as Tums, Gaviscon, Mylanta, or Maalox, may help. Your doctor also may recommend over-the-counter acid reducers, such as Pepcid AC, Tagamet HB, Zantac 75, or Prilosec. Read and follow all instructions on the label. If you use these medicines often, talk with your doctor. · Change your eating habits. ? It's best to eat several small meals instead of two or three large meals. ? After you eat, wait 2 to 3 hours before you lie down. ? Chocolate, mint, and alcohol can make GERD worse. ? Spicy foods, foods that have a lot of acid (like tomatoes and oranges), and coffee can make GERD symptoms worse in some people. If your symptoms are worse after you eat a certain food, you may want to stop eating that food to see if your symptoms get better. · Do not smoke or chew tobacco. Smoking can make GERD worse. If you need help quitting, talk to your doctor about stop-smoking programs and medicines. These can increase your chances of quitting for good. · If you have GERD symptoms at night, raise the head of your bed 6 to 8 inches by putting the frame on blocks or placing a foam wedge under the head of your mattress. (Adding extra pillows does not work.)  · Do not wear tight clothing around your middle. · Lose weight if you need to. Losing just 5 to 10 pounds can help. When should you call for help? Call your doctor now or seek immediate medical care if:    · You have new or different belly pain.     · Your stools are black and tarlike or have streaks of blood.    Watch closely for changes in your health, and be sure to contact your doctor if:    · Your symptoms have not improved after 2 days.     · Food seems to catch in your throat or chest.   Where can you learn more? Go to http://devendra-adam.info/. Enter U039 in the search box to learn more about \"Gastroesophageal Reflux Disease (GERD): Care Instructions. \"  Current as of: March 27, 2018  Content Version: 11.9  © 2273-1927 [employer]. Care instructions adapted under license by [employer] (which disclaims liability or warranty for this information). If you have questions about a medical condition or this instruction, always ask your healthcare professional. [place] [address] any warranty or liability for your use of this information.

## 2019-06-16 LAB
ATRIAL RATE: 80 BPM
ATRIAL RATE: 81 BPM
CALCULATED P AXIS, ECG09: 69 DEGREES
CALCULATED P AXIS, ECG09: 85 DEGREES
CALCULATED R AXIS, ECG10: 35 DEGREES
CALCULATED R AXIS, ECG10: 38 DEGREES
CALCULATED T AXIS, ECG11: 58 DEGREES
CALCULATED T AXIS, ECG11: 63 DEGREES
DIAGNOSIS, 93000: NORMAL
DIAGNOSIS, 93000: NORMAL
P-R INTERVAL, ECG05: 160 MS
P-R INTERVAL, ECG05: 160 MS
Q-T INTERVAL, ECG07: 364 MS
Q-T INTERVAL, ECG07: 378 MS
QRS DURATION, ECG06: 90 MS
QRS DURATION, ECG06: 92 MS
QTC CALCULATION (BEZET), ECG08: 422 MS
QTC CALCULATION (BEZET), ECG08: 435 MS
VENTRICULAR RATE, ECG03: 80 BPM
VENTRICULAR RATE, ECG03: 81 BPM

## 2019-07-03 ENCOUNTER — HOSPITAL ENCOUNTER (EMERGENCY)
Age: 74
Discharge: HOME | End: 2019-07-03
Attending: EMERGENCY MEDICINE
Payer: MEDICARE

## 2019-07-03 ENCOUNTER — APPOINTMENT (OUTPATIENT)
Dept: GENERAL RADIOLOGY | Age: 74
End: 2019-07-03
Attending: EMERGENCY MEDICINE
Payer: MEDICARE

## 2019-07-03 VITALS
WEIGHT: 169 LBS | BODY MASS INDEX: 26.53 KG/M2 | HEIGHT: 67 IN | TEMPERATURE: 98.9 F | RESPIRATION RATE: 17 BRPM | SYSTOLIC BLOOD PRESSURE: 111 MMHG | OXYGEN SATURATION: 98 % | HEART RATE: 104 BPM | DIASTOLIC BLOOD PRESSURE: 45 MMHG

## 2019-07-03 DIAGNOSIS — J18.9 PNEUMONIA OF RIGHT UPPER LOBE DUE TO INFECTIOUS ORGANISM: Primary | ICD-10-CM

## 2019-07-03 LAB
ALBUMIN SERPL-MCNC: 3.2 G/DL (ref 3.4–5)
ALBUMIN/GLOB SERPL: 0.9 {RATIO} (ref 0.8–1.7)
ALP SERPL-CCNC: 78 U/L (ref 45–117)
ALT SERPL-CCNC: 15 U/L (ref 13–56)
ANION GAP SERPL CALC-SCNC: 5 MMOL/L (ref 3–18)
APPEARANCE UR: CLEAR
AST SERPL-CCNC: 12 U/L (ref 15–37)
ATRIAL RATE: 102 BPM
BACTERIA URNS QL MICRO: ABNORMAL /HPF
BASOPHILS # BLD: 0 K/UL (ref 0–0.1)
BASOPHILS NFR BLD: 0 % (ref 0–2)
BILIRUB SERPL-MCNC: 0.6 MG/DL (ref 0.2–1)
BILIRUB UR QL: NEGATIVE
BUN SERPL-MCNC: 22 MG/DL (ref 7–18)
BUN/CREAT SERPL: 24 (ref 12–20)
CALCIUM SERPL-MCNC: 9 MG/DL (ref 8.5–10.1)
CALCULATED P AXIS, ECG09: 73 DEGREES
CALCULATED R AXIS, ECG10: 51 DEGREES
CALCULATED T AXIS, ECG11: 62 DEGREES
CHLORIDE SERPL-SCNC: 101 MMOL/L (ref 100–108)
CK MB CFR SERPL CALC: NORMAL % (ref 0–4)
CK MB SERPL-MCNC: <1 NG/ML (ref 5–25)
CK SERPL-CCNC: 35 U/L (ref 26–192)
CO2 SERPL-SCNC: 31 MMOL/L (ref 21–32)
COLOR UR: YELLOW
CREAT SERPL-MCNC: 0.92 MG/DL (ref 0.6–1.3)
DIAGNOSIS, 93000: NORMAL
DIFFERENTIAL METHOD BLD: ABNORMAL
EOSINOPHIL # BLD: 0 K/UL (ref 0–0.4)
EOSINOPHIL NFR BLD: 0 % (ref 0–5)
EPITH CASTS URNS QL MICRO: ABNORMAL /LPF (ref 0–5)
ERYTHROCYTE [DISTWIDTH] IN BLOOD BY AUTOMATED COUNT: 13.8 % (ref 11.6–14.5)
GLOBULIN SER CALC-MCNC: 3.5 G/DL (ref 2–4)
GLUCOSE SERPL-MCNC: 121 MG/DL (ref 74–99)
GLUCOSE UR STRIP.AUTO-MCNC: NEGATIVE MG/DL
HCT VFR BLD AUTO: 30.5 % (ref 35–45)
HGB BLD-MCNC: 9.8 G/DL (ref 12–16)
HGB UR QL STRIP: ABNORMAL
KETONES UR QL STRIP.AUTO: NEGATIVE MG/DL
LACTATE BLD-SCNC: 1.12 MMOL/L (ref 0.4–2)
LEUKOCYTE ESTERASE UR QL STRIP.AUTO: ABNORMAL
LYMPHOCYTES # BLD: 0.5 K/UL (ref 0.9–3.6)
LYMPHOCYTES NFR BLD: 2 % (ref 21–52)
MAGNESIUM SERPL-MCNC: 2.6 MG/DL (ref 1.6–2.6)
MCH RBC QN AUTO: 30.2 PG (ref 24–34)
MCHC RBC AUTO-ENTMCNC: 32.1 G/DL (ref 31–37)
MCV RBC AUTO: 93.8 FL (ref 74–97)
MONOCYTES # BLD: 1.5 K/UL (ref 0.05–1.2)
MONOCYTES NFR BLD: 7 % (ref 3–10)
NEUTS SEG # BLD: 19.3 K/UL (ref 1.8–8)
NEUTS SEG NFR BLD: 91 % (ref 40–73)
NITRITE UR QL STRIP.AUTO: NEGATIVE
P-R INTERVAL, ECG05: 162 MS
PH UR STRIP: 7 [PH] (ref 5–8)
PLATELET # BLD AUTO: 258 K/UL (ref 135–420)
PMV BLD AUTO: 9.5 FL (ref 9.2–11.8)
POTASSIUM SERPL-SCNC: 3.4 MMOL/L (ref 3.5–5.5)
PROT SERPL-MCNC: 6.7 G/DL (ref 6.4–8.2)
PROT UR STRIP-MCNC: ABNORMAL MG/DL
Q-T INTERVAL, ECG07: 326 MS
QRS DURATION, ECG06: 92 MS
QTC CALCULATION (BEZET), ECG08: 424 MS
RBC # BLD AUTO: 3.25 M/UL (ref 4.2–5.3)
RBC #/AREA URNS HPF: ABNORMAL /HPF (ref 0–5)
SODIUM SERPL-SCNC: 137 MMOL/L (ref 136–145)
SP GR UR REFRACTOMETRY: 1.01 (ref 1–1.03)
TROPONIN I SERPL-MCNC: <0.02 NG/ML (ref 0–0.04)
UROBILINOGEN UR QL STRIP.AUTO: 1 EU/DL (ref 0.2–1)
VENTRICULAR RATE, ECG03: 102 BPM
WBC # BLD AUTO: 21.3 K/UL (ref 4.6–13.2)
WBC URNS QL MICRO: ABNORMAL /HPF (ref 0–5)

## 2019-07-03 PROCEDURE — 71046 X-RAY EXAM CHEST 2 VIEWS: CPT

## 2019-07-03 PROCEDURE — 74011250637 HC RX REV CODE- 250/637: Performed by: EMERGENCY MEDICINE

## 2019-07-03 PROCEDURE — 82550 ASSAY OF CK (CPK): CPT

## 2019-07-03 PROCEDURE — 99285 EMERGENCY DEPT VISIT HI MDM: CPT

## 2019-07-03 PROCEDURE — 80053 COMPREHEN METABOLIC PANEL: CPT

## 2019-07-03 PROCEDURE — 93005 ELECTROCARDIOGRAM TRACING: CPT

## 2019-07-03 PROCEDURE — 83735 ASSAY OF MAGNESIUM: CPT

## 2019-07-03 PROCEDURE — 83605 ASSAY OF LACTIC ACID: CPT

## 2019-07-03 PROCEDURE — 81001 URINALYSIS AUTO W/SCOPE: CPT

## 2019-07-03 PROCEDURE — 96365 THER/PROPH/DIAG IV INF INIT: CPT

## 2019-07-03 PROCEDURE — 85025 COMPLETE CBC W/AUTO DIFF WBC: CPT

## 2019-07-03 PROCEDURE — 74011250636 HC RX REV CODE- 250/636: Performed by: EMERGENCY MEDICINE

## 2019-07-03 PROCEDURE — 96361 HYDRATE IV INFUSION ADD-ON: CPT

## 2019-07-03 PROCEDURE — 96375 TX/PRO/DX INJ NEW DRUG ADDON: CPT

## 2019-07-03 PROCEDURE — 74011000250 HC RX REV CODE- 250: Performed by: EMERGENCY MEDICINE

## 2019-07-03 RX ORDER — ONDANSETRON 4 MG/1
4 TABLET, ORALLY DISINTEGRATING ORAL
Qty: 12 TAB | Refills: 0 | Status: SHIPPED | OUTPATIENT
Start: 2019-07-03

## 2019-07-03 RX ORDER — DOXYCYCLINE HYCLATE 100 MG
100 TABLET ORAL 2 TIMES DAILY
Qty: 14 TAB | Refills: 0 | Status: SHIPPED | OUTPATIENT
Start: 2019-07-03 | End: 2019-07-10

## 2019-07-03 RX ORDER — ONDANSETRON 4 MG/1
4 TABLET, ORALLY DISINTEGRATING ORAL
Status: COMPLETED | OUTPATIENT
Start: 2019-07-03 | End: 2019-07-03

## 2019-07-03 RX ADMIN — SODIUM CHLORIDE 1000 ML: 900 INJECTION, SOLUTION INTRAVENOUS at 15:23

## 2019-07-03 RX ADMIN — CEFTRIAXONE SODIUM 2 G: 2 INJECTION, POWDER, FOR SOLUTION INTRAMUSCULAR; INTRAVENOUS at 16:12

## 2019-07-03 RX ADMIN — ONDANSETRON 4 MG: 4 TABLET, ORALLY DISINTEGRATING ORAL at 16:52

## 2019-07-03 RX ADMIN — SODIUM CHLORIDE 500 MG: 900 INJECTION, SOLUTION INTRAVENOUS at 16:40

## 2019-07-03 NOTE — DISCHARGE INSTRUCTIONS
You were seen and evaluated in the Emergency Department. Please understand that your work up is not all encompassing and you should follow up with your primary care physician for further management and continuity of care. Please return to Emergency Department or seek medical attention immediately if you have acute worsening in your symptoms or develop chest pain, shortness of breath, repeated vomiting, fever, altered level of consciousness, coughing up blood, or start sweating and feel clammy. If you were prescribed any medicine for home, please take as prescribed by your health-care provider. If you were given any follow-up appointments or numbers to call, please do so as instructed. Avoid any tobacco products or excessive alcohol. Patient Education        Pneumonia: Care Instructions  Your Care Instructions    Pneumonia is an infection of the lungs. Most cases are caused by infections from bacteria or viruses. Pneumonia may be mild or very severe. If it is caused by bacteria, you will be treated with antibiotics. It may take a few weeks to a few months to recover fully from pneumonia, depending on how sick you were and whether your overall health is good. Follow-up care is a key part of your treatment and safety. Be sure to make and go to all appointments, and call your doctor if you are having problems. It's also a good idea to know your test results and keep a list of the medicines you take. How can you care for yourself at home? · Take your antibiotics exactly as directed. Do not stop taking the medicine just because you are feeling better. You need to take the full course of antibiotics. · Take your medicines exactly as prescribed. Call your doctor if you think you are having a problem with your medicine. · Get plenty of rest and sleep. You may feel weak and tired for a while, but your energy level will improve with time.   · To prevent dehydration, drink plenty of fluids, enough so that your urine is light yellow or clear like water. Choose water and other caffeine-free clear liquids until you feel better. If you have kidney, heart, or liver disease and have to limit fluids, talk with your doctor before you increase the amount of fluids you drink. · Take care of your cough so you can rest. A cough that brings up mucus from your lungs is common with pneumonia. It is one way your body gets rid of the infection. But if coughing keeps you from resting or causes severe fatigue and chest-wall pain, talk to your doctor. He or she may suggest that you take a medicine to reduce the cough. · Use a vaporizer or humidifier to add moisture to your bedroom. Follow the directions for cleaning the machine. · Do not smoke or allow others to smoke around you. Smoke will make your cough last longer. If you need help quitting, talk to your doctor about stop-smoking programs and medicines. These can increase your chances of quitting for good. · Take an over-the-counter pain medicine, such as acetaminophen (Tylenol), ibuprofen (Advil, Motrin), or naproxen (Aleve). Read and follow all instructions on the label. · Do not take two or more pain medicines at the same time unless the doctor told you to. Many pain medicines have acetaminophen, which is Tylenol. Too much acetaminophen (Tylenol) can be harmful. · If you were given a spirometer to measure how well your lungs are working, use it as instructed. This can help your doctor tell how your recovery is going. · To prevent pneumonia in the future, talk to your doctor about getting a flu vaccine (once a year) and a pneumococcal vaccine (one time only for most people). When should you call for help? Call 911 anytime you think you may need emergency care.  For example, call if:    · You have severe trouble breathing.    Call your doctor now or seek immediate medical care if:    · You cough up dark brown or bloody mucus (sputum).     · You have new or worse trouble breathing.     · You are dizzy or lightheaded, or you feel like you may faint.    Watch closely for changes in your health, and be sure to contact your doctor if:    · You have a new or higher fever.     · You are coughing more deeply or more often.     · You are not getting better after 2 days (48 hours).     · You do not get better as expected. Where can you learn more? Go to http://devendra-adam.info/. Enter [id number] in the search box to learn more about \"Pneumonia: Care Instructions. \"  Current as of: September 5, 2018  Content Version: 11.9  © 7729-5775 [employer]. Care instructions adapted under license by [employer] (which disclaims liability or warranty for this information). If you have questions about a medical condition or this instruction, always ask your healthcare professional. [place] [address] any warranty or liability for your use of this information. Patient Education        Learning About COPD and How to Prevent Lung Infections  How do lung infections affect COPD? Lung infections like pneumonia and acute bronchitis are common causes of COPD flare-ups. And people who have COPD are more likely to get these lung infections, especially if they smoke. When you have COPD, it is important to know the symptoms of pneumonia and acute bronchitis and call your doctor if you have them. Symptoms include:  · A cough that brings up more mucus than usual.  · Fever. · Shortness of breath. What can you do to prevent these infections? Stay healthy  · Get a flu shot every year. · Get a pneumococcal vaccine shot. If you have had one before, ask your doctor whether you need another dose. Two different types of pneumococcal vaccines are recommended for people ages 72 and older. · If you must be around people with colds or the flu, wash your hands often. · Do not smoke.  This is the most important step you can take to prevent more damage to your lungs. If you need help quitting, talk to your doctor about stop-smoking programs and medicines. These can increase your chances of quitting for good. · Avoid secondhand smoke, air pollution, and high altitudes. Also avoid cold, dry air and hot, humid air. Stay at home with your windows closed when air pollution is bad. Exercise and eat well  · If your doctor recommends it, get more exercise. Walking is a good choice. Bit by bit, increase the amount you walk every day. Try for at least 30 minutes on most days of the week. · Eat regular, well-balanced meals. Eating right keeps your energy levels up and helps your body fight infection. · Get plenty of rest and sleep. Follow-up care is a key part of your treatment and safety. Be sure to make and go to all appointments, and call your doctor if you are having problems. It's also a good idea to know your test results and keep a list of the medicines you take. Where can you learn more? Go to http://devendra-adam.info/. Enter S397 in the search box to learn more about \"Learning About COPD and How to Prevent Lung Infections. \"  Current as of: September 5, 2018  Content Version: 11.9  © 6746-2366 [employer]. Care instructions adapted under license by [employer] (which disclaims liability or warranty for this information). If you have questions about a medical condition or this instruction, always ask your healthcare professional. [place] [address] any warranty or liability for your use of this information.

## 2019-07-03 NOTE — ED NOTES
I have reviewed discharge instructions with the patient. The patient verbalized understanding. Patient armband removed and shredded  Patient daughter present upon review of paperwork. Antibiotics still infusing, patient to be discharged after complete.

## 2019-07-03 NOTE — ED TRIAGE NOTES
Patient ambulate to ED with generalized weakness since Sunday, a/ox4,moves all extremities, speaking in complete sentences

## 2019-07-03 NOTE — ED PROVIDER NOTES
EMERGENCY DEPARTMENT HISTORY AND PHYSICAL EXAM    Date: 7/3/2019  Patient Name: Avis Peterson    History of Presenting Illness     Chief Complaint   Patient presents with    Nausea    Fatigue         History Provided By: Patient    Additional History (Context):   Avis Peterson is a 75 y.o. female with PMHX oxygen dependent COPD on chronic 3 L at home presents to the emergency department C/O malaise, fatigue. Reports that she experienced nausea 5 days ago after eating a meal at a restaurant, and since that time has been feeling fatigued. Patient denies any diarrhea or vomiting. Pt denies chest pain, shortness of breath, dysuria, and any other sxs or complaints. PCP: Val Caldwell MD    Current Facility-Administered Medications   Medication Dose Route Frequency Provider Last Rate Last Dose    sodium chloride 0.9 % bolus infusion 1,000 mL  1,000 mL IntraVENous ONCE Angelito Stack,  1,000 mL/hr at 07/03/19 1523 1,000 mL at 07/03/19 1523    cefTRIAXone (ROCEPHIN) 2 g in sterile water (preservative free) 20 mL IV syringe  2 g IntraVENous Q24H Angelito Stack, DO   2 g at 07/03/19 1612    azithromycin (ZITHROMAX) 500 mg in 0.9% sodium chloride 250 mL IVPB  500 mg IntraVENous Q24H Angelito Stack, DO         Current Outpatient Medications   Medication Sig Dispense Refill    doxycycline (VIBRA-TABS) 100 mg tablet Take 1 Tab by mouth two (2) times a day for 7 days. 14 Tab 0    pantoprazole (PROTONIX) 40 mg tablet Take 1 Tab by mouth daily for 20 days. 20 Tab 0    albuterol (PROVENTIL HFA, VENTOLIN HFA, PROAIR HFA) 90 mcg/actuation inhaler Take 2 Puffs by inhalation every four (4) hours as needed for Wheezing. 1 Inhaler 0    alum-mag hydroxide-simeth (MYLANTA) 200-200-20 mg/5 mL susp Take 30 mL by mouth three (3) times daily (with meals). 354 mL 0    losartan (COZAAR) 25 mg tablet Take 1 Tab by mouth two (2) times a day.  1 Tab 0    metoprolol tartrate (LOPRESSOR) 25 mg tablet Take 0.5 Tabs by mouth every twelve (12) hours. 1 Tab 0    spironolactone (ALDACTONE) 25 mg tablet Take 0.5 Tabs by mouth two (2) times a day. 1 Tab 0    fluticasone propionate (FLONASE NA) 2 Sprays by Nasal route.  multivitamin (ONE A DAY) tablet Take 1 Tab by mouth daily.  atorvastatin (LIPITOR) 40 mg tablet Take 40 mg by mouth daily.  budesonide-formoterol (SYMBICORT) 160-4.5 mcg/actuation HFAA Take 2 Puffs by inhalation two (2) times a day.  clopidogrel (PLAVIX) 75 mg tablet Take 75 mg by mouth daily.  pantoprazole (PROTONIX) 40 mg tablet Take 40 mg by mouth daily.  omega-3 fatty acids-vitamin e (FISH OIL) 1,000 mg cap Take 1 Cap by mouth. Dosage is 1200mg/600mg once daily      albuterol (PROVENTIL HFA, VENTOLIN HFA, PROAIR HFA) 90 mcg/actuation inhaler Take 1 Puff by inhalation as needed for Wheezing.  tiotropium (SPIRIVA) 18 mcg inhalation capsule Take 1 Cap by inhalation daily.  loratadine (CLARITIN) 10 mg tablet Take 10 mg by mouth daily. Past History     Past Medical History:  Past Medical History:   Diagnosis Date    Asthma     CAD (coronary artery disease)     MI    [facility]     Cataract     LEFT and RIGHT    Chronic obstructive pulmonary disease (HCC)     Diverticulitis     Emphysema lung ([facility] [facility]     Hypertension     Melanoma ([facility] [address]        Past Surgical History:  Past Surgical History:   Procedure Laterality Date    CARDIAC SURG PROCEDURE UNLIST      04/16 Stent    HX GYN      HX HYSTERECTOMY      HX ORTHOPAEDIC      cervical, lumbar       Family History:  History reviewed. No pertinent family history. Social History:  Social History     Tobacco Use    Smoking status: Former Smoker    Smokeless tobacco: Never Used   Substance Use Topics    Alcohol use: Yes     Comment: rarely    Drug use: No       Allergies:   Allergies   Allergen Reactions    Lexapro [Escitalopram] Rash    Zoloft [Sertraline] Rash Review of Systems   Review of Systems   Constitutional: Positive for appetite change and fatigue. Negative for chills and fever. HENT: Negative for congestion, ear pain, sinus pain and sore throat. Eyes: Negative for pain and visual disturbance. Respiratory: Negative for cough and shortness of breath. Cardiovascular: Negative for chest pain and leg swelling. Gastrointestinal: Negative for abdominal pain, constipation, diarrhea, nausea and vomiting. Genitourinary: Negative for dysuria, hematuria, vaginal bleeding and vaginal discharge. Musculoskeletal: Negative for back pain and neck pain. Skin: Negative for rash and wound. Neurological: Negative for dizziness, tremors, weakness, light-headedness and numbness. All other systems reviewed and are negative.       Physical Exam     Vitals:    07/03/19 1409   BP: 124/42   Pulse: 96   Resp: 18   Temp: 98.9 °F (37.2 °C)   SpO2: 99%   Weight: 76.7 kg (169 lb)   Height: 5' 7\" (1.702 m)     Physical Exam    Nursing note and vitals reviewed    Constitutional: Elderly  female, anxious but not appearing acutely ill  Head: Normocephalic, Atraumatic  Eyes: Pupils are equal, round, and reactive to light, EOMI  Neck: Supple, non-tender  Cardiovascular: Tachycardic, no murmurs, rubs, or gallops  Chest: Normal work of breathing and chest excursion bilaterally  Lungs: Clear to ausculation bilaterally, no wheezes, no rhonchi  Abdomen: Soft, non tender, non distended, normoactive bowel sounds  Back: No evidence of trauma or deformity  Extremities: No evidence of trauma or deformity, no LE edema  Skin: Warm and dry, normal cap refill  Neuro: Alert and appropriate, CN intact, normal speech, moving all 4 extremities freely and symmetrically  Psychiatric: Normal mood and affect       Diagnostic Study Results     Labs -     Recent Results (from the past 12 hour(s))   EKG, 12 LEAD, INITIAL    Collection Time: 07/03/19  2:31 PM   Result Value Ref Range Ventricular Rate 102 BPM    Atrial Rate 102 BPM    P-R Interval 162 ms    QRS Duration 92 ms    Q-T Interval 326 ms    QTC Calculation (Bezet) 424 ms    Calculated P Axis 73 degrees    Calculated R Axis 51 degrees    Calculated T Axis 62 degrees    Diagnosis       Sinus tachycardia  Otherwise normal ECG  Confirmed by Puja Garcia MD, [facility] ([id number]) on 7/3/2019 3:01:01 PM     CBC WITH AUTOMATED DIFF    Collection Time: 07/03/19  2:58 PM   Result Value Ref Range    WBC 21.3 (H) 4.6 - 13.2 K/uL    RBC 3.25 (L) 4.20 - 5.30 M/uL    HGB 9.8 (L) 12.0 - 16.0 g/dL    HCT 30.5 (L) 35.0 - 45.0 %    MCV 93.8 74.0 - 97.0 FL    MCH 30.2 24.0 - 34.0 PG    MCHC 32.1 31.0 - 37.0 g/dL    RDW 13.8 11.6 - 14.5 %    PLATELET 791 956 - 566 K/uL    MPV 9.5 9.2 - 11.8 FL    NEUTROPHILS 91 (H) 40 - 73 %    LYMPHOCYTES 2 (L) 21 - 52 %    MONOCYTES 7 3 - 10 %    EOSINOPHILS 0 0 - 5 %    BASOPHILS 0 0 - 2 %    ABS. NEUTROPHILS 19.3 (H) 1.8 - 8.0 K/UL    ABS. LYMPHOCYTES 0.5 (L) 0.9 - 3.6 K/UL    ABS. MONOCYTES 1.5 (H) 0.05 - 1.2 K/UL    ABS. EOSINOPHILS 0.0 0.0 - 0.4 K/UL    ABS. BASOPHILS 0.0 0.0 - 0.1 K/UL    DF AUTOMATED     METABOLIC PANEL, COMPREHENSIVE    Collection Time: 07/03/19  2:58 PM   Result Value Ref Range    Sodium 137 136 - 145 mmol/L    Potassium 3.4 (L) 3.5 - 5.5 mmol/L    Chloride 101 100 - 108 mmol/L    CO2 31 21 - 32 mmol/L    Anion gap 5 3.0 - 18 mmol/L    Glucose 121 (H) 74 - 99 mg/dL    BUN 22 (H) 7.0 - 18 MG/DL    Creatinine 0.92 0.6 - 1.3 MG/DL    BUN/Creatinine ratio 24 (H) 12 - 20      GFR est AA >60 >60 ml/min/1.73m2    GFR est non-AA 60 (L) >60 ml/min/1.73m2    Calcium 9.0 8.5 - 10.1 MG/DL    Bilirubin, total 0.6 0.2 - 1.0 MG/DL    ALT (SGPT) 15 13 - 56 U/L    AST (SGOT) 12 (L) 15 - 37 U/L    Alk.  phosphatase 78 45 - 117 U/L    Protein, total 6.7 6.4 - 8.2 g/dL    Albumin 3.2 (L) 3.4 - 5.0 g/dL    Globulin 3.5 2.0 - 4.0 g/dL    A-G Ratio 0.9 0.8 - 1.7     MAGNESIUM    Collection Time: 07/03/19  2:58 PM   Result Value Ref Range    Magnesium 2.6 1.6 - 2.6 mg/dL   CARDIAC PANEL,(CK, CKMB & TROPONIN)    Collection Time: 07/03/19  2:58 PM   Result Value Ref Range    CK 35 26 - 192 U/L    CK - MB <1.0 <3.6 ng/ml    CK-MB Index  0.0 - 4.0 %     CALCULATION NOT PERFORMED WHEN RESULT IS BELOW LINEAR LIMIT    Troponin-I, QT <0.02 0.0 - 0.045 NG/ML   POC LACTIC ACID    Collection Time: 07/03/19  3:55 PM   Result Value Ref Range    Lactic Acid (POC) 1.12 0.40 - 2.00 mmol/L       Radiologic Studies -   XR CHEST PA LAT   Final Result   IMPRESSION:      Patchy pneumonic consolidation in the periphery of the right upper lobe with   likely small associated pleural effusion. Recommend radiographic follow-up to   document expected resolution in 4-6 weeks' time. CT Results  (Last 48 hours)    None        CXR Results  (Last 48 hours)               07/03/19 1449  XR CHEST PA LAT Final result    Impression:  IMPRESSION:       Patchy pneumonic consolidation in the periphery of the right upper lobe with   likely small associated pleural effusion. Recommend radiographic follow-up to   document expected resolution in 4-6 weeks' time. Narrative:  EXAM: XR CHEST PA LAT       CLINICAL INDICATION/HISTORY: Chest pain and cough   -Additional: None       COMPARISON: Several prior chest radiographs, most recently June 14, 2019       TECHNIQUE: PA and lateral views of the chest       _______________       FINDINGS:       HEART AND MEDIASTINUM: Cardiac size and mediastinal contours are within normal   limits       LUNGS AND PLEURAL SPACES: Linear area of consolidation is present within the   periphery of the right upper lobe. Likely small right pleural effusion. Left   lung appears clear. No pneumothorax. BONY THORAX AND SOFT TISSUES: Multilevel thoracic spondylosis present without   acute osseous abnormality. _______________                   Medical Decision Making   I am the first provider for this patient.     I reviewed the vital signs, available nursing notes, past medical history, past surgical history, family history and social history. Vital Signs-Reviewed the patient's vital signs. Pulse Oximetry Analysis -99 % on room air    Cardiac Monitor:  Rate: 96 bpm  Rhythm: Regular    EKG interpretation: (Preliminary)  2:17 PM   Sinus tachycardia 102 bpm.  QTc 424 ms. No acute ST elevation    Records Reviewed: Nursing Notes and Old Medical Records    Provider Notes:   75 y.o. female presenting with generalized fatigue. Reports resolved nausea. On exam patient is afebrile with appropriate vital signs. 99% on her 3 L. Will check basic labs and evaluate for any metabolic derangements. We will also evaluate for ACS although very low on my differential.      Procedures:  Procedures    ED Course:   2:17 PM   Initial assessment performed. The patients presenting problems have been discussed, and they are in agreement with the care plan formulated and outlined with them. I have encouraged them to ask questions as they arise throughout their visit. 4:13 PM  Labs with a leukocytosis of 21,000. However no bandemia. Mild elevation in her BUN at 22. Given IV fluids in the emergency department. Chest x-ray concerning for upper lobe pneumonia. Point-of-care lactate obtained was within normal limits. Patient given a dose of azithromycin and Rocephin in the emergency department. Patient appropriate for discharge as she is not septic, saturating 99% on her chronic 3 L of oxygen and with a PSI pneumonia score of 65. Discussed strict return precautions with the patient. Diagnosis and Disposition       DISCHARGE NOTE:  4:13 PM    Patrick Chandra's  results have been reviewed with her. She has been counseled regarding her diagnosis, treatment, and plan. She verbally conveys understanding and agreement of the signs, symptoms, diagnosis, treatment and prognosis and additionally agrees to follow up as discussed.   She also agrees with the care-plan and conveys that all of her questions have been answered. I have also provided discharge instructions for her that include: educational information regarding their diagnosis and treatment, and list of reasons why they would want to return to the ED prior to their follow-up appointment, should her condition change. She has been provided with education for proper emergency department utilization. CLINICAL IMPRESSION:    1. Pneumonia of right upper lobe due to infectious organism ([address]        PLAN:  1. D/C Home  2. Current Discharge Medication List      START taking these medications    Details   doxycycline (VIBRA-TABS) 100 mg tablet Take 1 Tab by mouth two (2) times a day for 7 days. Qty: 14 Tab, Refills: 0           3. Follow-up Information     Follow up With Specialties Details Why Contact Info    Tamra Carlton MD Internal Medicine Schedule an appointment as soon as possible for a visit in 2 days  [facility] [id number] [facility] EMERGENCY DEPT Emergency Medicine  As needed if symptoms worsen [address] [place] [address]  [phone]        ____________________________________     Please note that this dictation was completed with [employer], the computer voice recognition software. Quite often unanticipated grammatical, syntax, homophones, and other interpretive errors are inadvertently transcribed by the computer software. Please disregard these errors. Please excuse any errors that have escaped final proofreading.

## 2019-07-04 NOTE — PROGRESS NOTES
Waiting for culture and sensitivity if ordered. Patient is already being treated for pneumonia with doxycycline and was given Rocephin here in the emergency room.

## 2022-03-18 PROBLEM — I21.4 NSTEMI (NON-ST ELEVATED MYOCARDIAL INFARCTION) (HCC): Status: ACTIVE | Noted: 2018-05-04

## 2022-03-18 PROBLEM — I25.10 CAD (CORONARY ARTERY DISEASE): Status: ACTIVE | Noted: 2018-05-01

## 2022-03-19 PROBLEM — R79.89 ELEVATED TROPONIN: Status: ACTIVE | Noted: 2018-05-01

## 2022-03-19 PROBLEM — R77.8 ELEVATED TROPONIN: Status: ACTIVE | Noted: 2018-05-01

## 2023-06-04 ENCOUNTER — HOSPITAL ENCOUNTER (EMERGENCY)
Facility: HOSPITAL | Age: 78
Discharge: HOME | End: 2023-06-04
Attending: EMERGENCY MEDICINE
Payer: MEDICARE

## 2023-06-04 ENCOUNTER — APPOINTMENT (OUTPATIENT)
Facility: HOSPITAL | Age: 78
End: 2023-06-04
Payer: MEDICARE

## 2023-06-04 VITALS
HEART RATE: 88 BPM | BODY MASS INDEX: 27.48 KG/M2 | DIASTOLIC BLOOD PRESSURE: 85 MMHG | OXYGEN SATURATION: 100 % | TEMPERATURE: 97.3 F | SYSTOLIC BLOOD PRESSURE: 161 MMHG | RESPIRATION RATE: 22 BRPM | HEIGHT: 66 IN | WEIGHT: 171 LBS

## 2023-06-04 DIAGNOSIS — J44.1 COPD EXACERBATION (HCC): Primary | ICD-10-CM

## 2023-06-04 PROCEDURE — 99284 EMERGENCY DEPT VISIT MOD MDM: CPT

## 2023-06-04 PROCEDURE — 71046 X-RAY EXAM CHEST 2 VIEWS: CPT

## 2023-06-04 PROCEDURE — 94640 AIRWAY INHALATION TREATMENT: CPT

## 2023-06-04 PROCEDURE — 6370000000 HC RX 637 (ALT 250 FOR IP): Performed by: EMERGENCY MEDICINE

## 2023-06-04 RX ORDER — ALBUTEROL SULFATE 90 UG/1
2 AEROSOL, METERED RESPIRATORY (INHALATION) ONCE
Status: DISCONTINUED | OUTPATIENT
Start: 2023-06-04 | End: 2023-06-04

## 2023-06-04 RX ORDER — AZITHROMYCIN 250 MG/1
250 TABLET, FILM COATED ORAL SEE ADMIN INSTRUCTIONS
Qty: 6 TABLET | Refills: 0 | Status: SHIPPED | OUTPATIENT
Start: 2023-06-04 | End: 2023-06-09

## 2023-06-04 RX ORDER — ALBUTEROL SULFATE 90 UG/1
2 AEROSOL, METERED RESPIRATORY (INHALATION) ONCE
Status: COMPLETED | OUTPATIENT
Start: 2023-06-04 | End: 2023-06-04

## 2023-06-04 RX ADMIN — ALBUTEROL SULFATE 2 PUFF: 108 AEROSOL, METERED RESPIRATORY (INHALATION) at 03:18

## 2023-06-04 ASSESSMENT — PAIN SCALES - GENERAL: PAINLEVEL_OUTOF10: 0

## 2023-06-04 ASSESSMENT — PAIN - FUNCTIONAL ASSESSMENT: PAIN_FUNCTIONAL_ASSESSMENT: NONE - DENIES PAIN

## 2023-11-07 ENCOUNTER — HOSPITAL ENCOUNTER (OUTPATIENT)
Facility: HOSPITAL | Age: 78
Discharge: HOME | End: 2023-11-10
Payer: MEDICARE

## 2023-11-07 ENCOUNTER — TRANSCRIBE ORDERS (OUTPATIENT)
Facility: HOSPITAL | Age: 78
End: 2023-11-07

## 2023-11-07 DIAGNOSIS — D80.8 OTHER IMMUNODEFICIENCIES WITH PREDOMINANTLY ANTIBODY DEFECTS (HCC): Primary | ICD-10-CM

## 2023-11-07 DIAGNOSIS — D80.8 OTHER IMMUNODEFICIENCIES WITH PREDOMINANTLY ANTIBODY DEFECTS (HCC): ICD-10-CM

## 2023-11-07 PROCEDURE — 86735 MUMPS ANTIBODY: CPT

## 2023-11-07 PROCEDURE — 82785 ASSAY OF IGE: CPT

## 2023-11-07 PROCEDURE — 86162 COMPLEMENT TOTAL (CH50): CPT

## 2023-11-07 PROCEDURE — 82784 ASSAY IGA/IGD/IGG/IGM EACH: CPT

## 2023-11-07 PROCEDURE — 86317 IMMUNOASSAY INFECTIOUS AGENT: CPT

## 2023-11-07 PROCEDURE — 36415 COLL VENOUS BLD VENIPUNCTURE: CPT

## 2023-11-07 PROCEDURE — 86765 RUBEOLA ANTIBODY: CPT

## 2023-11-07 PROCEDURE — 86762 RUBELLA ANTIBODY: CPT

## 2023-11-07 PROCEDURE — 86160 COMPLEMENT ANTIGEN: CPT

## 2023-11-07 PROCEDURE — 82787 IGG 1 2 3 OR 4 EACH: CPT

## 2023-11-08 LAB
C DIPHTHERIAE AB SER IA-ACNC: 0.16 IU/ML
C TETANI TOXOID IGG SERPL IA-ACNC: <0.1 IU/ML
C4 SERPL-MCNC: 22 MG/DL (ref 12–38)
IGA SERPL-MCNC: 159 MG/DL (ref 70–400)
IGG SERPL-MCNC: 544 MG/DL (ref 700–1600)
IGM SERPL-MCNC: 48 MG/DL (ref 40–230)
MEV IGG SER IA-ACNC: >300 AU/ML
MUV IGG SER IA-ACNC: >300 AU/ML
RUBV IGG SER-IMP: REACTIVE

## 2023-11-09 LAB
CH50 SERPL-ACNC: >60 U/ML
IGG SERPL-MCNC: 555 MG/DL (ref 586–1602)
IGG1 SER-MCNC: 301 MG/DL (ref 248–810)
IGG2 SER-MCNC: 196 MG/DL (ref 130–555)
IGG3 SER-MCNC: 49 MG/DL (ref 15–102)
IGG4 SER-MCNC: 7 MG/DL (ref 2–96)

## 2023-11-10 LAB — IGE SERPL-ACNC: 11 IU/ML (ref 6–495)

## 2023-11-13 LAB
S PN DA SERO 19F IGG SER IA-MCNC: 14.6 UG/ML
S PNEUM DA 1 IGG SER IA-MCNC: 11.2 UG/ML
S PNEUM DA 10A IGG SER IA-MCNC: >19.5 UG/ML
S PNEUM DA 11A IGG SER IA-MCNC: 3.6 UG/ML
S PNEUM DA 12F IGG SER IA-MCNC: 3.4 UG/ML
S PNEUM DA 14 IGG SER IA-MCNC: 6.2 UG/ML
S PNEUM DA 15B IGG SER IA-MCNC: 2.7 UG/ML
S PNEUM DA 17F IGG SER IA-MCNC: 1.4 UG/ML
S PNEUM DA 18C IGG SER IA-MCNC: >8.1 UG/ML
S PNEUM DA 19A IGG SER IA-MCNC: 1.9 UG/ML
S PNEUM DA 2 IGG SER IA-MCNC: 1.8 UG/ML
S PNEUM DA 20A IGG SER IA-MCNC: 11.7 UG/ML
S PNEUM DA 22F IGG SER IA-MCNC: 1.5 UG/ML
S PNEUM DA 23F IGG SER IA-MCNC: 5.4 UG/ML
S PNEUM DA 3 IGG SER IA-MCNC: 0.8 UG/ML
S PNEUM DA 33F IGG SER IA-MCNC: >10.1 UG/ML
S PNEUM DA 4 IGG SER IA-MCNC: 0.2 UG/ML
S PNEUM DA 5 IGG SER IA-MCNC: 9.4 UG/ML
S PNEUM DA 6B IGG SER IA-MCNC: 4.5 UG/ML
S PNEUM DA 7F IGG SER IA-MCNC: 1 UG/ML
S PNEUM DA 8 IGG SER IA-MCNC: 0.5 UG/ML
S PNEUM DA 9N IGG SER IA-MCNC: 0.3 UG/ML
S PNEUM DA 9V IGG SER IA-MCNC: 2.6 UG/ML

## 2023-11-14 LAB
FAX TO NUMBER: NORMAL
TEST RESULTS FAXED TO: NORMAL

## 2023-12-15 ENCOUNTER — HOSPITAL ENCOUNTER (EMERGENCY)
Facility: HOSPITAL | Age: 78
Discharge: HOME | End: 2023-12-15
Payer: MEDICARE

## 2023-12-15 ENCOUNTER — APPOINTMENT (OUTPATIENT)
Facility: HOSPITAL | Age: 78
End: 2023-12-15
Payer: MEDICARE

## 2023-12-15 VITALS
WEIGHT: 171 LBS | OXYGEN SATURATION: 100 % | TEMPERATURE: 98.6 F | HEIGHT: 66 IN | HEART RATE: 89 BPM | DIASTOLIC BLOOD PRESSURE: 90 MMHG | BODY MASS INDEX: 27.48 KG/M2 | RESPIRATION RATE: 20 BRPM | SYSTOLIC BLOOD PRESSURE: 136 MMHG

## 2023-12-15 DIAGNOSIS — N30.00 ACUTE CYSTITIS WITHOUT HEMATURIA: ICD-10-CM

## 2023-12-15 DIAGNOSIS — Z86.59 HISTORY OF ANXIETY: ICD-10-CM

## 2023-12-15 DIAGNOSIS — R42 EPISODIC LIGHTHEADEDNESS: Primary | ICD-10-CM

## 2023-12-15 LAB
ALBUMIN SERPL-MCNC: 3.5 G/DL (ref 3.4–5)
ALBUMIN/GLOB SERPL: 1.2 (ref 0.8–1.7)
ALP SERPL-CCNC: 57 U/L (ref 45–117)
ALT SERPL-CCNC: 17 U/L (ref 13–56)
ANION GAP SERPL CALC-SCNC: 4 MMOL/L (ref 3–18)
APPEARANCE UR: CLEAR
AST SERPL-CCNC: 13 U/L (ref 10–38)
BACTERIA URNS QL MICRO: NEGATIVE /HPF
BASOPHILS # BLD: 0 K/UL (ref 0–0.1)
BASOPHILS NFR BLD: 0 % (ref 0–2)
BILIRUB SERPL-MCNC: 0.4 MG/DL (ref 0.2–1)
BILIRUB UR QL: NEGATIVE
BUN SERPL-MCNC: 13 MG/DL (ref 7–18)
BUN/CREAT SERPL: 20 (ref 12–20)
CALCIUM SERPL-MCNC: 8.7 MG/DL (ref 8.5–10.1)
CHLORIDE SERPL-SCNC: 106 MMOL/L (ref 100–111)
CO2 SERPL-SCNC: 33 MMOL/L (ref 21–32)
COLOR UR: YELLOW
CREAT SERPL-MCNC: 0.66 MG/DL (ref 0.6–1.3)
DIFFERENTIAL METHOD BLD: ABNORMAL
EKG ATRIAL RATE: 82 BPM
EKG DIAGNOSIS: NORMAL
EKG P AXIS: 76 DEGREES
EKG P-R INTERVAL: 168 MS
EKG Q-T INTERVAL: 358 MS
EKG QRS DURATION: 90 MS
EKG QTC CALCULATION (BAZETT): 418 MS
EKG R AXIS: 0 DEGREES
EKG T AXIS: 70 DEGREES
EKG VENTRICULAR RATE: 82 BPM
EOSINOPHIL # BLD: 0.2 K/UL (ref 0–0.4)
EOSINOPHIL NFR BLD: 3 % (ref 0–5)
EPITH CASTS URNS QL MICRO: NORMAL /LPF (ref 0–5)
ERYTHROCYTE [DISTWIDTH] IN BLOOD BY AUTOMATED COUNT: 12.8 % (ref 11.6–14.5)
GLOBULIN SER CALC-MCNC: 2.9 G/DL (ref 2–4)
GLUCOSE BLD STRIP.AUTO-MCNC: 100 MG/DL (ref 70–110)
GLUCOSE SERPL-MCNC: 90 MG/DL (ref 74–99)
GLUCOSE UR STRIP.AUTO-MCNC: NEGATIVE MG/DL
HCT VFR BLD AUTO: 34.5 % (ref 35–45)
HGB BLD-MCNC: 11 G/DL (ref 12–16)
HGB UR QL STRIP: NEGATIVE
IMM GRANULOCYTES # BLD AUTO: 0.1 K/UL (ref 0–0.04)
IMM GRANULOCYTES NFR BLD AUTO: 1 % (ref 0–0.5)
KETONES UR QL STRIP.AUTO: ABNORMAL MG/DL
LEUKOCYTE ESTERASE UR QL STRIP.AUTO: ABNORMAL
LYMPHOCYTES # BLD: 1.3 K/UL (ref 0.9–3.6)
LYMPHOCYTES NFR BLD: 18 % (ref 21–52)
MAGNESIUM SERPL-MCNC: 1.9 MG/DL (ref 1.6–2.6)
MCH RBC QN AUTO: 30.8 PG (ref 24–34)
MCHC RBC AUTO-ENTMCNC: 31.9 G/DL (ref 31–37)
MCV RBC AUTO: 96.6 FL (ref 78–100)
MONOCYTES # BLD: 0.6 K/UL (ref 0.05–1.2)
MONOCYTES NFR BLD: 8 % (ref 3–10)
NEUTS SEG # BLD: 5.1 K/UL (ref 1.8–8)
NEUTS SEG NFR BLD: 70 % (ref 40–73)
NITRITE UR QL STRIP.AUTO: NEGATIVE
NRBC # BLD: 0 K/UL (ref 0–0.01)
NRBC BLD-RTO: 0 PER 100 WBC
PH UR STRIP: 6 (ref 5–8)
PLATELET # BLD AUTO: 305 K/UL (ref 135–420)
PMV BLD AUTO: 9.4 FL (ref 9.2–11.8)
POTASSIUM SERPL-SCNC: 3.6 MMOL/L (ref 3.5–5.5)
PROT SERPL-MCNC: 6.4 G/DL (ref 6.4–8.2)
PROT UR STRIP-MCNC: NEGATIVE MG/DL
RBC # BLD AUTO: 3.57 M/UL (ref 4.2–5.3)
RBC #/AREA URNS HPF: NORMAL /HPF (ref 0–5)
SODIUM SERPL-SCNC: 143 MMOL/L (ref 136–145)
SP GR UR REFRACTOMETRY: 1.02 (ref 1–1.03)
TROPONIN I SERPL HS-MCNC: 7 NG/L (ref 0–54)
UROBILINOGEN UR QL STRIP.AUTO: 1 EU/DL (ref 0.2–1)
WBC # BLD AUTO: 7.2 K/UL (ref 4.6–13.2)
WBC URNS QL MICRO: NORMAL /HPF (ref 0–5)

## 2023-12-15 PROCEDURE — 87086 URINE CULTURE/COLONY COUNT: CPT

## 2023-12-15 PROCEDURE — 6370000000 HC RX 637 (ALT 250 FOR IP): Performed by: PHYSICIAN ASSISTANT

## 2023-12-15 PROCEDURE — 93005 ELECTROCARDIOGRAM TRACING: CPT | Performed by: PHYSICIAN ASSISTANT

## 2023-12-15 PROCEDURE — 81001 URINALYSIS AUTO W/SCOPE: CPT

## 2023-12-15 PROCEDURE — 99285 EMERGENCY DEPT VISIT HI MDM: CPT

## 2023-12-15 PROCEDURE — 84484 ASSAY OF TROPONIN QUANT: CPT

## 2023-12-15 PROCEDURE — 82962 GLUCOSE BLOOD TEST: CPT

## 2023-12-15 PROCEDURE — 71045 X-RAY EXAM CHEST 1 VIEW: CPT

## 2023-12-15 PROCEDURE — 80053 COMPREHEN METABOLIC PANEL: CPT

## 2023-12-15 PROCEDURE — 85025 COMPLETE CBC W/AUTO DIFF WBC: CPT

## 2023-12-15 PROCEDURE — 83735 ASSAY OF MAGNESIUM: CPT

## 2023-12-15 PROCEDURE — 93010 ELECTROCARDIOGRAM REPORT: CPT | Performed by: INTERNAL MEDICINE

## 2023-12-15 PROCEDURE — 94762 N-INVAS EAR/PLS OXIMTRY CONT: CPT

## 2023-12-15 RX ORDER — CEPHALEXIN 250 MG/1
500 CAPSULE ORAL
Status: COMPLETED | OUTPATIENT
Start: 2023-12-15 | End: 2023-12-15

## 2023-12-15 RX ORDER — CEPHALEXIN 500 MG/1
500 CAPSULE ORAL 2 TIMES DAILY
Qty: 14 CAPSULE | Refills: 0 | Status: SHIPPED | OUTPATIENT
Start: 2023-12-15 | End: 2023-12-22

## 2023-12-15 RX ADMIN — CEPHALEXIN 500 MG: 250 CAPSULE ORAL at 17:19

## 2023-12-15 NOTE — ED PROVIDER NOTES
ACS/MI, or CVA/TIA    Pt feeling better. Ambulatory and asymptomatic. No FND. Labs reassuring. Will tx for mild UTI. Doubt CVA/TIA. No indication for intracranial imaging. Suspect anxiety. Reasons to RTED discussed with pt. All questions answered. Pt feels comfortable going home at this time. Pt expressed understanding and she agrees with plan. FINAL IMPRESSION     1. Episodic lightheadedness    2. Acute cystitis without hematuria    3. History of anxiety            DISPOSITION/PLAN   DISPOSITION Decision To Discharge 12/15/2023 05:41:27 PM           PATIENT REFERRED TO:  Nely Nova, [address] [address] [phone]    Schedule an appointment as soon as possible for a visit       [facility] EMERGENCY DEPT  [address] [place]  [phone]             DISCHARGE MEDICATIONS:     Medication List        START taking these medications      cephALEXin 500 MG capsule  Commonly known as: Keflex  Take 1 capsule by mouth 2 times daily for 7 days               Where to Get Your Medications        These medications were sent to [facility]/pharmacy #[id number] [address]      Phone: [phone]   cephALEXin 500 MG capsule                  I am the Primary Clinician of Record. (Please note that parts of this dictation were completed with voice recognition software. Quite often unanticipated grammatical, syntax, homophones, and other interpretive errors are inadvertently transcribed by the computer software. Please disregards these errors.  Please excuse any errors that have escaped final proofreading.)       Sid Torres  12/15/23 3987

## 2023-12-15 NOTE — ED TRIAGE NOTES
Patient arrived via EMS from [facility] as walk-in c/o dizziness/light headed and nausea onset this morning. Patient states that lately she has been having lower BP's.      /100 per EMS     Pt normally on 3LPM, hx COPD

## 2023-12-16 LAB
BACTERIA SPEC CULT: NORMAL
SERVICE CMNT-IMP: NORMAL

## 2024-01-26 ENCOUNTER — APPOINTMENT (OUTPATIENT)
Facility: HOSPITAL | Age: 79
End: 2024-01-26
Payer: MEDICARE

## 2024-01-26 ENCOUNTER — HOSPITAL ENCOUNTER (EMERGENCY)
Facility: HOSPITAL | Age: 79
Discharge: HOME | End: 2024-01-26
Attending: STUDENT IN AN ORGANIZED HEALTH CARE EDUCATION/TRAINING PROGRAM
Payer: MEDICARE

## 2024-01-26 VITALS
DIASTOLIC BLOOD PRESSURE: 70 MMHG | HEART RATE: 99 BPM | WEIGHT: 173 LBS | HEIGHT: 66 IN | RESPIRATION RATE: 24 BRPM | TEMPERATURE: 98.5 F | BODY MASS INDEX: 27.8 KG/M2 | OXYGEN SATURATION: 97 % | SYSTOLIC BLOOD PRESSURE: 168 MMHG

## 2024-01-26 DIAGNOSIS — R42 LIGHTHEADEDNESS: ICD-10-CM

## 2024-01-26 DIAGNOSIS — R00.0 TACHYCARDIA: Primary | ICD-10-CM

## 2024-01-26 LAB
ALBUMIN SERPL-MCNC: 4.2 G/DL (ref 3.4–5)
ALBUMIN/GLOB SERPL: 1.4 (ref 0.8–1.7)
ALP SERPL-CCNC: 63 U/L (ref 45–117)
ALT SERPL-CCNC: 23 U/L (ref 13–56)
ANION GAP SERPL CALC-SCNC: 4 MMOL/L (ref 3–18)
AST SERPL-CCNC: 16 U/L (ref 10–38)
BASOPHILS # BLD: 0 K/UL (ref 0–0.1)
BASOPHILS NFR BLD: 1 % (ref 0–2)
BILIRUB SERPL-MCNC: 0.6 MG/DL (ref 0.2–1)
BUN SERPL-MCNC: 18 MG/DL (ref 7–18)
BUN/CREAT SERPL: 20 (ref 12–20)
CALCIUM SERPL-MCNC: 9.7 MG/DL (ref 8.5–10.1)
CHLORIDE SERPL-SCNC: 101 MMOL/L (ref 100–111)
CO2 SERPL-SCNC: 32 MMOL/L (ref 21–32)
CREAT SERPL-MCNC: 0.88 MG/DL (ref 0.6–1.3)
D DIMER PPP FEU-MCNC: 0.39 UG/ML(FEU)
DIFFERENTIAL METHOD BLD: ABNORMAL
EOSINOPHIL # BLD: 0.1 K/UL (ref 0–0.4)
EOSINOPHIL NFR BLD: 2 % (ref 0–5)
ERYTHROCYTE [DISTWIDTH] IN BLOOD BY AUTOMATED COUNT: 13.4 % (ref 11.6–14.5)
FLUAV RNA SPEC QL NAA+PROBE: NOT DETECTED
FLUBV RNA SPEC QL NAA+PROBE: NOT DETECTED
GLOBULIN SER CALC-MCNC: 3 G/DL (ref 2–4)
GLUCOSE SERPL-MCNC: 162 MG/DL (ref 74–99)
HCT VFR BLD AUTO: 35.6 % (ref 35–45)
HGB BLD-MCNC: 11.4 G/DL (ref 12–16)
IMM GRANULOCYTES # BLD AUTO: 0 K/UL (ref 0–0.04)
IMM GRANULOCYTES NFR BLD AUTO: 0 % (ref 0–0.5)
LIPASE SERPL-CCNC: 42 U/L (ref 13–75)
LYMPHOCYTES # BLD: 1 K/UL (ref 0.9–3.6)
LYMPHOCYTES NFR BLD: 16 % (ref 21–52)
MCH RBC QN AUTO: 30.6 PG (ref 24–34)
MCHC RBC AUTO-ENTMCNC: 32 G/DL (ref 31–37)
MCV RBC AUTO: 95.4 FL (ref 78–100)
MONOCYTES # BLD: 0.5 K/UL (ref 0.05–1.2)
MONOCYTES NFR BLD: 8 % (ref 3–10)
NEUTS SEG # BLD: 4.7 K/UL (ref 1.8–8)
NEUTS SEG NFR BLD: 74 % (ref 40–73)
NRBC # BLD: 0 K/UL (ref 0–0.01)
NRBC BLD-RTO: 0 PER 100 WBC
PLATELET # BLD AUTO: 266 K/UL (ref 135–420)
PMV BLD AUTO: 9.5 FL (ref 9.2–11.8)
POTASSIUM SERPL-SCNC: 3.7 MMOL/L (ref 3.5–5.5)
PROT SERPL-MCNC: 7.2 G/DL (ref 6.4–8.2)
RBC # BLD AUTO: 3.73 M/UL (ref 4.2–5.3)
SARS-COV-2 RNA RESP QL NAA+PROBE: NOT DETECTED
SODIUM SERPL-SCNC: 137 MMOL/L (ref 136–145)
TROPONIN I SERPL HS-MCNC: 6 NG/L (ref 0–54)
WBC # BLD AUTO: 6.4 K/UL (ref 4.6–13.2)

## 2024-01-26 PROCEDURE — 93005 ELECTROCARDIOGRAM TRACING: CPT | Performed by: STUDENT IN AN ORGANIZED HEALTH CARE EDUCATION/TRAINING PROGRAM

## 2024-01-26 PROCEDURE — 99285 EMERGENCY DEPT VISIT HI MDM: CPT

## 2024-01-26 PROCEDURE — 84484 ASSAY OF TROPONIN QUANT: CPT

## 2024-01-26 PROCEDURE — 96361 HYDRATE IV INFUSION ADD-ON: CPT

## 2024-01-26 PROCEDURE — 80053 COMPREHEN METABOLIC PANEL: CPT

## 2024-01-26 PROCEDURE — 85025 COMPLETE CBC W/AUTO DIFF WBC: CPT

## 2024-01-26 PROCEDURE — 96360 HYDRATION IV INFUSION INIT: CPT

## 2024-01-26 PROCEDURE — 83690 ASSAY OF LIPASE: CPT

## 2024-01-26 PROCEDURE — 2580000003 HC RX 258: Performed by: STUDENT IN AN ORGANIZED HEALTH CARE EDUCATION/TRAINING PROGRAM

## 2024-01-26 PROCEDURE — 87636 SARSCOV2 & INF A&B AMP PRB: CPT

## 2024-01-26 PROCEDURE — 85379 FIBRIN DEGRADATION QUANT: CPT

## 2024-01-26 PROCEDURE — 71045 X-RAY EXAM CHEST 1 VIEW: CPT

## 2024-01-26 RX ORDER — 0.9 % SODIUM CHLORIDE 0.9 %
1000 INTRAVENOUS SOLUTION INTRAVENOUS ONCE
Status: COMPLETED | OUTPATIENT
Start: 2024-01-26 | End: 2024-01-26

## 2024-01-26 RX ADMIN — SODIUM CHLORIDE 1000 ML: 9 INJECTION, SOLUTION INTRAVENOUS at 18:53

## 2024-01-26 ASSESSMENT — PAIN DESCRIPTION - LOCATION: LOCATION: HEAD

## 2024-01-26 ASSESSMENT — PAIN SCALES - GENERAL: PAINLEVEL_OUTOF10: 3

## 2024-01-26 ASSESSMENT — PAIN - FUNCTIONAL ASSESSMENT: PAIN_FUNCTIONAL_ASSESSMENT: 0-10

## 2024-01-26 NOTE — ED TRIAGE NOTES
Patient presents to the ed with generalized weakness. Pt states that she has been extremely weak all day. Pt states that she decided to monitor her BP and pulse and while doing so her machine alerted her that her HR had dropped down to the 30's and she felt extremely weak as though she was going to pass out. Pt states that the highest it had got was in the 60's. Patient's HR at this time 107. Patient states that she was diagnosed with bradycardia by her cardiologist. Patient at this time complaining of a slight headache rating it a 3/10. Care ongoing.

## 2024-01-26 NOTE — ED PROVIDER NOTES
[facility] EMERGENCY DEPT  EMERGENCY DEPARTMENT ENCOUNTER    Patient Name: Hortensia Carpenter  MRN: [id number]  YOB: 1945  Provider: Wilfredo Castillo DO  PCP: Priscila Conway MD   Time/Date of evaluation: 5:41 PM EST on 1/26/24    History of Presenting Illness     Chief Complaint   Patient presents with    Fatigue    Irregular Heart Beat       History Provided By: Patient     History (Narative):   Hortensia Carpenter is a 79 y.o. female hx copd on 2.5L, CAD, \"bradycardia\", told she had anxiety, htn, diverticulitis, presents CC she felt lightheaded today several times, took her pulse and it seemed to be in 30s at least on two occassions. She says she follows with Dr. Katlyn smart/ cardiology was told she had bradycardia after a monitor was done 2-3 years ago.She says she gets this lighteadedness sensation on and off the last few years but today was worse. No new chest pain, dyspnea. Abdomen feels uncomfortable to her but not notable change from her baseline, does not feel emergent to her. No vomiting. No new pain elsewhere. Compliant on her meds. Wearing her 2.5L oxygen as baseline. Doesn't feel anxious at this time.     Nursing Notes were all reviewed and agreed with or any disagreements were addressed in the HPI.    Past History     Past Medical History:  Past Medical History:   Diagnosis Date    Asthma     CAD (coronary artery disease)     MI    Cancer (HCC)     Cataract     LEFT and RIGHT    Chronic obstructive pulmonary disease (HCC)     Diverticulitis     Emphysema lung (HCC)     Hypertension     Melanoma (HCC)        Past Surgical History:  Past Surgical History:   Procedure Laterality Date    GYN      HYSTERECTOMY (CERVIX STATUS UNKNOWN)      ORTHOPEDIC SURGERY      cervical, lumbar    [place] UNLISTED PROCEDURE CARDIAC SURGERY      04/16 Stent       Family History:  No family history on file.    Social History:  Social History     Tobacco Use    Smoking status: Former    Smokeless tobacco: Never   Substance Use

## 2024-01-27 LAB
EKG ATRIAL RATE: 100 BPM
EKG ATRIAL RATE: 102 BPM
EKG DIAGNOSIS: NORMAL
EKG DIAGNOSIS: NORMAL
EKG P AXIS: 72 DEGREES
EKG P AXIS: 72 DEGREES
EKG P-R INTERVAL: 172 MS
EKG P-R INTERVAL: 178 MS
EKG Q-T INTERVAL: 322 MS
EKG Q-T INTERVAL: 338 MS
EKG QRS DURATION: 84 MS
EKG QRS DURATION: 88 MS
EKG QTC CALCULATION (BAZETT): 419 MS
EKG QTC CALCULATION (BAZETT): 436 MS
EKG R AXIS: 29 DEGREES
EKG R AXIS: 9 DEGREES
EKG T AXIS: 60 DEGREES
EKG T AXIS: 66 DEGREES
EKG VENTRICULAR RATE: 100 BPM
EKG VENTRICULAR RATE: 102 BPM

## 2024-01-27 PROCEDURE — 93010 ELECTROCARDIOGRAM REPORT: CPT | Performed by: INTERNAL MEDICINE

## 2024-01-27 NOTE — ED NOTES
Pt given verbal and written dc instructions. Pt verbalized understanding of all instructions and exited ED via wheelchair to waiting room to await Son in law arrival.

## 2024-01-27 NOTE — DISCHARGE INSTRUCTIONS
Follow up with your cardiologist, or Dr. Bermudez or Dr. Armand smart/ cardiology. You can also discuss situation with primary care doctor. Return to ER if persistent fevers, severe pain, difficulty breathing, or fainting. At time of discharge, your covid and flu test will likely still be pending but you can call ER later for results or look on patient portal.

## 2024-06-26 NOTE — ED NOTES
Pt able to ambulate without any issues. Pt denies dizziness or lightheadedness upon walking.       Romi Nur RN  12/15/23 1196
No

## 2025-05-25 ENCOUNTER — HOSPITAL ENCOUNTER (INPATIENT)
Facility: HOSPITAL | Age: 80
LOS: 1 days | Discharge: HOME HEALTH | DRG: 287 | End: 2025-05-27
Attending: EMERGENCY MEDICINE | Admitting: INTERNAL MEDICINE
Payer: MEDICARE

## 2025-05-25 ENCOUNTER — APPOINTMENT (OUTPATIENT)
Facility: HOSPITAL | Age: 80
DRG: 287 | End: 2025-05-25
Payer: MEDICARE

## 2025-05-25 DIAGNOSIS — R07.9 CHEST PAIN, UNSPECIFIED TYPE: ICD-10-CM

## 2025-05-25 DIAGNOSIS — I21.4 NSTEMI (NON-ST ELEVATED MYOCARDIAL INFARCTION) (HCC): Primary | ICD-10-CM

## 2025-05-25 DIAGNOSIS — R06.02 SHORTNESS OF BREATH: ICD-10-CM

## 2025-05-25 LAB
BASOPHILS # BLD: 0.04 K/UL (ref 0–0.1)
BASOPHILS NFR BLD: 0.5 % (ref 0–2)
DIFFERENTIAL METHOD BLD: ABNORMAL
EOSINOPHIL # BLD: 0.13 K/UL (ref 0–0.4)
EOSINOPHIL NFR BLD: 1.5 % (ref 0–5)
ERYTHROCYTE [DISTWIDTH] IN BLOOD BY AUTOMATED COUNT: 12.3 % (ref 11.6–14.5)
HCT VFR BLD AUTO: 32.9 % (ref 35–45)
HGB BLD-MCNC: 10.6 G/DL (ref 12–16)
IMM GRANULOCYTES # BLD AUTO: 0.02 K/UL (ref 0–0.04)
IMM GRANULOCYTES NFR BLD AUTO: 0.2 % (ref 0–0.5)
LYMPHOCYTES # BLD: 1.54 K/UL (ref 0.9–3.3)
LYMPHOCYTES NFR BLD: 18.3 % (ref 21–52)
MCH RBC QN AUTO: 31.8 PG (ref 24–34)
MCHC RBC AUTO-ENTMCNC: 32.2 G/DL (ref 31–37)
MCV RBC AUTO: 98.8 FL (ref 78–100)
MONOCYTES # BLD: 0.79 K/UL (ref 0.05–1.2)
MONOCYTES NFR BLD: 9.4 % (ref 3–10)
NEUTS SEG # BLD: 5.88 K/UL (ref 1.8–8)
NEUTS SEG NFR BLD: 70.1 % (ref 40–73)
NRBC # BLD: 0 K/UL (ref 0–0.01)
NRBC BLD-RTO: 0 PER 100 WBC
PLATELET # BLD AUTO: 260 K/UL (ref 135–420)
PMV BLD AUTO: 9.8 FL (ref 9.2–11.8)
RBC # BLD AUTO: 3.33 M/UL (ref 4.2–5.3)
WBC # BLD AUTO: 8.4 K/UL (ref 4.6–13.2)

## 2025-05-25 PROCEDURE — 99291 CRITICAL CARE FIRST HOUR: CPT

## 2025-05-25 PROCEDURE — 84484 ASSAY OF TROPONIN QUANT: CPT

## 2025-05-25 PROCEDURE — 71045 X-RAY EXAM CHEST 1 VIEW: CPT

## 2025-05-25 PROCEDURE — 80053 COMPREHEN METABOLIC PANEL: CPT

## 2025-05-25 PROCEDURE — 93005 ELECTROCARDIOGRAM TRACING: CPT | Performed by: EMERGENCY MEDICINE

## 2025-05-25 PROCEDURE — 85025 COMPLETE CBC W/AUTO DIFF WBC: CPT

## 2025-05-25 PROCEDURE — 83735 ASSAY OF MAGNESIUM: CPT

## 2025-05-25 RX ORDER — METOPROLOL TARTRATE 25 MG/1
12.5 TABLET, FILM COATED ORAL 2 TIMES DAILY
Status: DISCONTINUED | OUTPATIENT
Start: 2025-05-26 | End: 2025-05-26

## 2025-05-25 RX ORDER — SPIRONOLACTONE 25 MG/1
12.5 TABLET ORAL DAILY
Status: DISCONTINUED | OUTPATIENT
Start: 2025-05-26 | End: 2025-05-26

## 2025-05-25 RX ORDER — ASPIRIN 81 MG/1
162 TABLET, CHEWABLE ORAL
Status: COMPLETED | OUTPATIENT
Start: 2025-05-26 | End: 2025-05-26

## 2025-05-25 RX ORDER — LOSARTAN POTASSIUM 25 MG/1
25 TABLET ORAL DAILY
Status: DISCONTINUED | OUTPATIENT
Start: 2025-05-26 | End: 2025-05-27 | Stop reason: HOSPADM

## 2025-05-25 RX ORDER — SPIRONOLACTONE 25 MG/1
12.5 TABLET ORAL DAILY
Status: DISCONTINUED | OUTPATIENT
Start: 2025-05-26 | End: 2025-05-25

## 2025-05-25 RX ORDER — LOSARTAN POTASSIUM 50 MG/1
25 TABLET ORAL DAILY
Status: DISCONTINUED | OUTPATIENT
Start: 2025-05-26 | End: 2025-05-25

## 2025-05-25 ASSESSMENT — LIFESTYLE VARIABLES
HOW OFTEN DO YOU HAVE A DRINK CONTAINING ALCOHOL: NEVER
HOW MANY STANDARD DRINKS CONTAINING ALCOHOL DO YOU HAVE ON A TYPICAL DAY: PATIENT DOES NOT DRINK

## 2025-05-26 ENCOUNTER — APPOINTMENT (OUTPATIENT)
Facility: HOSPITAL | Age: 80
DRG: 287 | End: 2025-05-26
Payer: MEDICARE

## 2025-05-26 PROBLEM — R07.9 CHEST PAIN WITH HIGH RISK FOR CARDIAC ETIOLOGY: Status: ACTIVE | Noted: 2025-05-26

## 2025-05-26 PROBLEM — I25.10 CAD (CORONARY ARTERY DISEASE): Chronic | Status: ACTIVE | Noted: 2018-05-01

## 2025-05-26 LAB
ALBUMIN SERPL-MCNC: 4 G/DL (ref 3.4–5)
ALBUMIN/GLOB SERPL: 1.5 (ref 0.8–1.7)
ALP SERPL-CCNC: 60 U/L (ref 45–117)
ALT SERPL-CCNC: 17 U/L (ref 10–35)
ANION GAP SERPL CALC-SCNC: 13 MMOL/L (ref 3–18)
APTT PPP: 26.3 SEC (ref 23–36.4)
APTT PPP: 86.7 SEC (ref 23–36.4)
APTT PPP: 95.4 SEC (ref 23–36.4)
AST SERPL-CCNC: 25 U/L (ref 10–38)
BILIRUB SERPL-MCNC: 0.2 MG/DL (ref 0.2–1)
BUN SERPL-MCNC: 16 MG/DL (ref 6–23)
BUN/CREAT SERPL: 24 (ref 12–20)
CALCIUM SERPL-MCNC: 9.2 MG/DL (ref 8.5–10.1)
CHLORIDE SERPL-SCNC: 102 MMOL/L (ref 98–107)
CO2 SERPL-SCNC: 26 MMOL/L (ref 21–32)
CREAT SERPL-MCNC: 0.68 MG/DL (ref 0.6–1.3)
GLOBULIN SER CALC-MCNC: 2.6 G/DL (ref 2–4)
GLUCOSE SERPL-MCNC: 123 MG/DL (ref 74–108)
MAGNESIUM SERPL-MCNC: 2 MG/DL (ref 1.6–2.6)
POTASSIUM SERPL-SCNC: 3.7 MMOL/L (ref 3.5–5.5)
PROT SERPL-MCNC: 6.6 G/DL (ref 6.4–8.2)
SODIUM SERPL-SCNC: 141 MMOL/L (ref 136–145)
TROPONIN T SERPL HS-MCNC: 17.6 NG/L (ref 0–14)
TROPONIN T SERPL HS-MCNC: 27.8 NG/L (ref 0–14)
TROPONIN T SERPL HS-MCNC: 34.7 NG/L (ref 0–14)
TROPONIN T SERPL HS-MCNC: 36 NG/L (ref 0–14)
TROPONIN T SERPL HS-MCNC: 40.8 NG/L (ref 0–14)
TROPONIN T SERPL HS-MCNC: 45.1 NG/L (ref 0–14)

## 2025-05-26 PROCEDURE — 6370000000 HC RX 637 (ALT 250 FOR IP): Performed by: EMERGENCY MEDICINE

## 2025-05-26 PROCEDURE — 36415 COLL VENOUS BLD VENIPUNCTURE: CPT

## 2025-05-26 PROCEDURE — 6370000000 HC RX 637 (ALT 250 FOR IP): Performed by: INTERNAL MEDICINE

## 2025-05-26 PROCEDURE — 96374 THER/PROPH/DIAG INJ IV PUSH: CPT

## 2025-05-26 PROCEDURE — 1100000000 HC RM PRIVATE

## 2025-05-26 PROCEDURE — 6360000004 HC RX CONTRAST MEDICATION: Performed by: EMERGENCY MEDICINE

## 2025-05-26 PROCEDURE — 85730 THROMBOPLASTIN TIME PARTIAL: CPT

## 2025-05-26 PROCEDURE — 2500000003 HC RX 250 WO HCPCS: Performed by: INTERNAL MEDICINE

## 2025-05-26 PROCEDURE — 71275 CT ANGIOGRAPHY CHEST: CPT

## 2025-05-26 PROCEDURE — 6360000002 HC RX W HCPCS: Performed by: EMERGENCY MEDICINE

## 2025-05-26 PROCEDURE — 84484 ASSAY OF TROPONIN QUANT: CPT

## 2025-05-26 PROCEDURE — 93005 ELECTROCARDIOGRAM TRACING: CPT | Performed by: EMERGENCY MEDICINE

## 2025-05-26 RX ORDER — SPIRONOLACTONE 25 MG/1
12.5 TABLET ORAL 2 TIMES DAILY
COMMUNITY

## 2025-05-26 RX ORDER — BUDESONIDE, GLYCOPYRROLATE, AND FORMOTEROL FUMARATE 160; 9; 4.8 UG/1; UG/1; UG/1
2 AEROSOL, METERED RESPIRATORY (INHALATION) DAILY
COMMUNITY

## 2025-05-26 RX ORDER — OMEPRAZOLE 20 MG/1
20 CAPSULE, DELAYED RELEASE ORAL DAILY
COMMUNITY

## 2025-05-26 RX ORDER — IOPAMIDOL 755 MG/ML
75 INJECTION, SOLUTION INTRAVASCULAR
Status: COMPLETED | OUTPATIENT
Start: 2025-05-26 | End: 2025-05-26

## 2025-05-26 RX ORDER — SODIUM CHLORIDE 0.9 % (FLUSH) 0.9 %
5-40 SYRINGE (ML) INJECTION PRN
Status: DISCONTINUED | OUTPATIENT
Start: 2025-05-26 | End: 2025-05-27 | Stop reason: HOSPADM

## 2025-05-26 RX ORDER — ACETAMINOPHEN 325 MG/1
650 TABLET ORAL EVERY 6 HOURS PRN
Status: DISCONTINUED | OUTPATIENT
Start: 2025-05-26 | End: 2025-05-27 | Stop reason: HOSPADM

## 2025-05-26 RX ORDER — ATORVASTATIN CALCIUM 40 MG/1
40 TABLET, FILM COATED ORAL DAILY
COMMUNITY

## 2025-05-26 RX ORDER — BISACODYL 10 MG
10 SUPPOSITORY, RECTAL RECTAL DAILY PRN
Status: DISCONTINUED | OUTPATIENT
Start: 2025-05-26 | End: 2025-05-27 | Stop reason: HOSPADM

## 2025-05-26 RX ORDER — SPIRONOLACTONE 25 MG/1
12.5 TABLET ORAL DAILY
Status: DISCONTINUED | OUTPATIENT
Start: 2025-05-26 | End: 2025-05-27 | Stop reason: HOSPADM

## 2025-05-26 RX ORDER — ALBUTEROL SULFATE 0.83 MG/ML
2.5 SOLUTION RESPIRATORY (INHALATION) EVERY 6 HOURS PRN
Status: DISCONTINUED | OUTPATIENT
Start: 2025-05-26 | End: 2025-05-27 | Stop reason: HOSPADM

## 2025-05-26 RX ORDER — POTASSIUM CHLORIDE 750 MG/1
10 TABLET, EXTENDED RELEASE ORAL ONCE
Status: COMPLETED | OUTPATIENT
Start: 2025-05-26 | End: 2025-05-26

## 2025-05-26 RX ORDER — BISACODYL 5 MG/1
5 TABLET, DELAYED RELEASE ORAL DAILY
Status: DISCONTINUED | OUTPATIENT
Start: 2025-05-26 | End: 2025-05-27 | Stop reason: HOSPADM

## 2025-05-26 RX ORDER — SODIUM CHLORIDE 0.9 % (FLUSH) 0.9 %
5-40 SYRINGE (ML) INJECTION EVERY 12 HOURS SCHEDULED
Status: DISCONTINUED | OUTPATIENT
Start: 2025-05-26 | End: 2025-05-27 | Stop reason: HOSPADM

## 2025-05-26 RX ORDER — LOSARTAN POTASSIUM 25 MG/1
25 TABLET ORAL DAILY
COMMUNITY

## 2025-05-26 RX ORDER — METOPROLOL TARTRATE 25 MG/1
25 TABLET, FILM COATED ORAL 2 TIMES DAILY
COMMUNITY

## 2025-05-26 RX ORDER — ACETAMINOPHEN 650 MG/1
650 SUPPOSITORY RECTAL EVERY 6 HOURS PRN
Status: DISCONTINUED | OUTPATIENT
Start: 2025-05-26 | End: 2025-05-27 | Stop reason: HOSPADM

## 2025-05-26 RX ORDER — MORPHINE SULFATE 2 MG/ML
2 INJECTION, SOLUTION INTRAMUSCULAR; INTRAVENOUS
Status: DISCONTINUED | OUTPATIENT
Start: 2025-05-26 | End: 2025-05-27 | Stop reason: HOSPADM

## 2025-05-26 RX ORDER — POLYETHYLENE GLYCOL 3350 17 G/17G
17 POWDER, FOR SOLUTION ORAL DAILY PRN
Status: DISCONTINUED | OUTPATIENT
Start: 2025-05-26 | End: 2025-05-27 | Stop reason: HOSPADM

## 2025-05-26 RX ORDER — POTASSIUM CHLORIDE 750 MG/1
10 TABLET, EXTENDED RELEASE ORAL ONCE
Status: DISCONTINUED | OUTPATIENT
Start: 2025-05-26 | End: 2025-05-26

## 2025-05-26 RX ORDER — ASPIRIN 81 MG/1
81 TABLET, CHEWABLE ORAL DAILY
Status: DISCONTINUED | OUTPATIENT
Start: 2025-05-27 | End: 2025-05-27 | Stop reason: HOSPADM

## 2025-05-26 RX ORDER — ASPIRIN 81 MG/1
81 TABLET, CHEWABLE ORAL DAILY
COMMUNITY

## 2025-05-26 RX ORDER — HEPARIN SODIUM 1000 [USP'U]/ML
4000 INJECTION, SOLUTION INTRAVENOUS; SUBCUTANEOUS PRN
Status: DISCONTINUED | OUTPATIENT
Start: 2025-05-26 | End: 2025-05-27

## 2025-05-26 RX ORDER — CHLORAL HYDRATE 500 MG
1000 CAPSULE ORAL DAILY
COMMUNITY

## 2025-05-26 RX ORDER — ATORVASTATIN CALCIUM 20 MG/1
80 TABLET, FILM COATED ORAL NIGHTLY
Status: DISCONTINUED | OUTPATIENT
Start: 2025-05-26 | End: 2025-05-27 | Stop reason: HOSPADM

## 2025-05-26 RX ORDER — MORPHINE SULFATE 2 MG/ML
1 INJECTION, SOLUTION INTRAMUSCULAR; INTRAVENOUS
Status: DISCONTINUED | OUTPATIENT
Start: 2025-05-26 | End: 2025-05-27 | Stop reason: HOSPADM

## 2025-05-26 RX ORDER — CARVEDILOL 3.12 MG/1
3.12 TABLET ORAL 2 TIMES DAILY WITH MEALS
Status: DISCONTINUED | OUTPATIENT
Start: 2025-05-26 | End: 2025-05-27

## 2025-05-26 RX ORDER — HEPARIN SODIUM 1000 [USP'U]/ML
2000 INJECTION, SOLUTION INTRAVENOUS; SUBCUTANEOUS PRN
Status: DISCONTINUED | OUTPATIENT
Start: 2025-05-26 | End: 2025-05-27

## 2025-05-26 RX ORDER — MULTIVIT-MIN/FERROUS GLUCONATE 9 MG/15 ML
15 LIQUID (ML) ORAL DAILY
COMMUNITY

## 2025-05-26 RX ORDER — ONDANSETRON 2 MG/ML
4 INJECTION INTRAMUSCULAR; INTRAVENOUS EVERY 6 HOURS PRN
Status: DISCONTINUED | OUTPATIENT
Start: 2025-05-26 | End: 2025-05-27 | Stop reason: HOSPADM

## 2025-05-26 RX ORDER — LORATADINE 10 MG/1
10 TABLET ORAL DAILY
COMMUNITY

## 2025-05-26 RX ORDER — ASCORBIC ACID 500 MG
1000 TABLET ORAL DAILY
COMMUNITY

## 2025-05-26 RX ORDER — HEPARIN SODIUM 1000 [USP'U]/ML
4000 INJECTION, SOLUTION INTRAVENOUS; SUBCUTANEOUS ONCE
Status: COMPLETED | OUTPATIENT
Start: 2025-05-26 | End: 2025-05-26

## 2025-05-26 RX ORDER — HEPARIN SODIUM 10000 [USP'U]/100ML
5-30 INJECTION, SOLUTION INTRAVENOUS CONTINUOUS
Status: DISCONTINUED | OUTPATIENT
Start: 2025-05-26 | End: 2025-05-27

## 2025-05-26 RX ORDER — SODIUM CHLORIDE 9 MG/ML
INJECTION, SOLUTION INTRAVENOUS PRN
Status: DISCONTINUED | OUTPATIENT
Start: 2025-05-26 | End: 2025-05-27 | Stop reason: HOSPADM

## 2025-05-26 RX ORDER — ALBUTEROL SULFATE 0.63 MG/3ML
1 SOLUTION RESPIRATORY (INHALATION) EVERY 6 HOURS PRN
COMMUNITY

## 2025-05-26 RX ADMIN — ATORVASTATIN CALCIUM 80 MG: 20 TABLET, FILM COATED ORAL at 08:21

## 2025-05-26 RX ADMIN — SPIRONOLACTONE 12.5 MG: 25 TABLET ORAL at 08:11

## 2025-05-26 RX ADMIN — SODIUM CHLORIDE, PRESERVATIVE FREE 10 ML: 5 INJECTION INTRAVENOUS at 08:12

## 2025-05-26 RX ADMIN — IOPAMIDOL 75 ML: 755 INJECTION, SOLUTION INTRAVENOUS at 04:00

## 2025-05-26 RX ADMIN — METOPROLOL TARTRATE 12.5 MG: 25 TABLET, FILM COATED ORAL at 00:06

## 2025-05-26 RX ADMIN — NITROGLYCERIN 0.5 INCH: 20 OINTMENT TOPICAL at 08:20

## 2025-05-26 RX ADMIN — SPIRONOLACTONE 12.5 MG: 25 TABLET ORAL at 00:06

## 2025-05-26 RX ADMIN — NITROGLYCERIN 0.5 INCH: 20 OINTMENT TOPICAL at 20:27

## 2025-05-26 RX ADMIN — POTASSIUM CHLORIDE 10 MEQ: 750 TABLET, EXTENDED RELEASE ORAL at 08:12

## 2025-05-26 RX ADMIN — ASPIRIN 162 MG: 81 TABLET, CHEWABLE ORAL at 00:09

## 2025-05-26 RX ADMIN — HEPARIN SODIUM 12 UNITS/KG/HR: 10000 INJECTION, SOLUTION INTRAVENOUS at 03:42

## 2025-05-26 RX ADMIN — CARVEDILOL 3.12 MG: 3.12 TABLET, FILM COATED ORAL at 08:11

## 2025-05-26 RX ADMIN — CARVEDILOL 3.12 MG: 3.12 TABLET, FILM COATED ORAL at 17:29

## 2025-05-26 RX ADMIN — NITROGLYCERIN 0.5 INCH: 20 OINTMENT TOPICAL at 14:34

## 2025-05-26 RX ADMIN — LOSARTAN POTASSIUM 25 MG: 50 TABLET, FILM COATED ORAL at 00:06

## 2025-05-26 RX ADMIN — BISACODYL 5 MG: 5 TABLET, COATED ORAL at 08:11

## 2025-05-26 RX ADMIN — HEPARIN SODIUM 4000 UNITS: 1000 INJECTION INTRAVENOUS; SUBCUTANEOUS at 03:38

## 2025-05-26 ASSESSMENT — PAIN SCALES - GENERAL
PAINLEVEL_OUTOF10: 0

## 2025-05-26 NOTE — PLAN OF CARE
Problem: Pain  Goal: Verbalizes/displays adequate comfort level or baseline comfort level  Outcome: Progressing  Flowsheets (Taken 5/26/2025 2948)  Verbalizes/displays adequate comfort level or baseline comfort level:   Encourage patient to monitor pain and request assistance   Assess pain using appropriate pain scale

## 2025-05-26 NOTE — PROGRESS NOTES
Hospitalist Progress Note    Patient: Hortensia Carpenter MRN: [id number]  CSN: [id number]    YOB: 1945  Age: 80 y.o.  Sex: female    DOA: 5/25/2025 LOS:  LOS: 0 days          Chief Complain :  Chest pain  80 y.o.  female w/ PMH of oCAD/MI, HLD, cataracts, COPD/asthma, hypertension, and melanoma (remission) who presents with chest discomfort.   Subjective:   Patient laying in bed, comfortable.    Assessment/Plan     Active Hospital Problems    Diagnosis     Chest pain with high risk for cardiac etiology [R07.9]     Melanoma (HCC) [C43.9]     CAD (coronary artery disease) [I25.10]     ST elevation (STEMI) myocardial infarction involving right coronary artery (HCC) [I21.11]     Benign essential HTN [I10]     COPD (chronic obstructive pulmonary disease) (HCC) [J44.9]     Dyslipidemia, goal LDL below 70 [E78.5]           Chest pain, high-risk  Elevated troponin  CAD, by history s/p PCI  on ASA, spironolactone and losartan.  Heparin drip  Troponin T peaked at 42, now trending down  CXR: negative for acute findings.  ECG: w/o acute current of injury pattern.   CTA chest: No acute pulmonary embolus. Emphysema with diffuse bronchial wall thickening and scarring along the right major/minor fissure. No pulmonary edema or pneumonia. Nonobstructing left renal calculus.  TTE: ordered.   C/W GDMT for possible NSTEMI.       COPD, not in exacerbation (non on home oxygen)  Emphysema, incidentally found  CTA chest: emphysema noted     HLD  Check lipid panel and A1C.     HTN  Monitor BP     Melanoma, in remission      DVT Prophylaxis:  []Lovenox SQ  [] Heparin SQ  [] SCDs  [] Coumadin, Eliquis, Xarelto,   [x] On Heparin gtt or therapeutic

## 2025-05-26 NOTE — CONSULTS
lesion or pruritis.   Musc/skeletal: no bone or joint complains.  Vasc: No edema, cyanosis or claudication.   Endo: No heat/cold intolerance, no polyuria,polydipsia or polyphagia.   Neuro: No unilateral weakness, numbness, tingling. No seizures.   Heme: No easy bruising or bleeding.        Physical:   Patient Vitals for the past 6 hrs:   Temp Pulse Resp BP SpO2   05/26/25 0900 -- 73 -- -- --   05/26/25 0711 98.1 °F (36.7 °C) 62 12 (!) 146/67 100 %         Exam:   General Appearance: Comfortable, not using accessory muscles of respiration.  HEENT: DRAKE.   HEAD: Atraumatic  NECK: No JVD, no thyroidomeglay.   CAROTIDS: clear  LUNGS: Bilateral air entry positive, no crackles  HEART: S1+S2     ABD: Non-tender, BS Audible    EXT: No edema, and no cysnosis.  VASCULAR EXAM: Pulses are intact.    PSYCHIATRIC EXAM: Mood is appropriate.  MUSCULOSKELETAL: Grossly no joint deformity.  NEUROLOGICAL: Motor and sensory sytem intact and Cranial nerves II-XII intact.    Review of Data:   LABS:   Lab Results   Component Value Date/Time    WBC 8.4 05/25/2025 11:14 PM    HGB 10.6 05/25/2025 11:14 PM    HCT 32.9 05/25/2025 11:14 PM     05/25/2025 11:14 PM     Lab Results   Component Value Date/Time     05/25/2025 11:14 PM    K 3.7 05/25/2025 11:14 PM     05/25/2025 11:14 PM    CO2 26 05/25/2025 11:14 PM    BUN 16 05/25/2025 11:14 PM     No results found for: \"CHOL\", \"CHLST\", \"CHOLV\", \"HDL\", \"HDLC\", \"LDL\", \"LDLC\"  No components found for: \"GPT\"  No results found for: \"LABA1C\"    RADIOLOGY:  [unfilled]  [unfilled]      Cardiology Procedures: [unfilled] [unfilled] Cardiolite (Tc-99m Sestamibi) stress test        Impression / Plan:    Patient Active Problem List   Diagnosis    NSTEMI (non-ST elevated myocardial infarction) (HCC)    CAD (coronary artery disease)    Elevated troponin    Benign essential HTN    ST elevation (STEMI) myocardial infarction involving right coronary artery (HCC)    Dyslipidemia, goal LDL below 70

## 2025-05-26 NOTE — ED NOTES
ED TO INPATIENT SBAR HANDOFF    Patient Name: Hortensia Carpenter   Preferred Name: Hortensia  : 1945  80 y.o.   Family/Caregiver Present: no   Code Status Order: Full Code  PO Status: NPO:NA  Telemetry Order: Yes  C-SSRS: Risk of Suicide: No Risk  Sitter no NA  Restraints:     Sepsis Risk Score Sepsis V2 Risk Score: 3.2    Situation  Chief Complaint   Patient presents with    Anxiety     Brief Description of Patient's Condition:   Mental Status: A+OX4  Arrived from:HOME- INDEPENDENT  Imaging:   CTA CHEST W WO CONTRAST   Final Result      1. No acute pulmonary embolus.   2. Emphysema with diffuse bronchial wall thickening and scarring along the right   major/minor fissure. No pulmonary edema or pneumonia.   3. Nonobstructing left renal calculus.            Electronically signed by Fabrice Franks      XR CHEST PORTABLE   Final Result   No acute process.      Electronically signed by Fabrice Franks        Abnormal labs:   Abnormal Labs Reviewed   TROPONIN - Abnormal; Notable for the following components:       Result Value    Troponin T 17.6 (*)     All other components within normal limits   TROPONIN - Abnormal; Notable for the following components:    Troponin T 34.7 (*)     All other components within normal limits   CBC WITH AUTO DIFFERENTIAL - Abnormal; Notable for the following components:    RBC 3.33 (*)     Hemoglobin 10.6 (*)     Hematocrit 32.9 (*)     Lymphocytes % 18.3 (*)     All other components within normal limits   COMPREHENSIVE METABOLIC PANEL - Abnormal; Notable for the following components:    Glucose 123 (*)     BUN/Creatinine Ratio 24 (*)     All other components within normal limits   TROPONIN - Abnormal; Notable for the following components:    Troponin T 45.1 (*)     All other components within normal limits       Background  Allergies:   Allergies   Allergen Reactions    Lexapro [Escitalopram]     Zoloft [Sertraline]      History:   Past Medical History:   Diagnosis Date    Asthma     CAD

## 2025-05-26 NOTE — ED TRIAGE NOTES
Pt to ED via EMS from home c/o anxiety. Pt believes her anxiety today is related to a previous heart attack.

## 2025-05-26 NOTE — CARE COORDINATION
05/26/25 1300   Service Assessment   Patient Orientation Alert and Oriented   Cognition Alert   History Provided By Patient   Primary Caregiver Self   Support Systems Family Members   PCP Verified by CM Yes   Last Visit to PCP Within last 6 months  (sees every 6 months; will see again in July)   Prior Functional Level Independent in ADLs/IADLs   Current Functional Level Independent in ADLs/IADLs   Can patient return to prior living arrangement Yes   Ability to make needs known: Good   Family able to assist with home care needs: Yes   Would you like for me to discuss the discharge plan with any other family members/significant others, and if so, who? Yes  (daughter Maria Elena)   Financial Resources Medicare   Social/Functional History   Lives With Alone   Type of Home House   Home Layout Two level   Home Equipment None   Receives Help From Family   Prior Level of Assist for ADLs Independent   Prior Level of Assist for Homemaking Independent   Homemaking Responsibilities Yes   Ambulation Assistance Independent   Prior Level of Assist for Transfers Independent   Active  Yes   Mode of Transportation Car   Occupation Retired   Discharge Planning   Type of Residence House   Living Arrangements Alone  (but lives in adjoining part of daughter's house)   Current Services Prior To Admission Oxygen Therapy  (2,5 L - [facility])   Potential Assistance Needed N/A   DME Ordered? No   Potential Assistance Purchasing Medications No   Type of Home Care Services None   Patient expects to be discharged to: House   Follow Up Appointment: Best Day/Time  Monday AM   One/Two Story Residence Two story   # of Interior Steps 14   Interior Rails Right   Lift Chair Available No   History of falls? 0   Services At/After Discharge   Transition of Care Consult (CM Consult) Discharge Planning   Services At/After Discharge None    Resource Information Provided? No   Mode of Transport at Discharge Self   Confirm Follow Up Transport Family

## 2025-05-26 NOTE — H&P
CONTRAST  Result Date: 5/26/2025  INDICATION:  Tachycardia, chest pain EXAM:  CTA Chest with contrast for Pulmonary Embolus COMPARISON:  None TECHNIQUE:  Following the intravenous administration of IV contrast thin helical axial images were obtained through the chest. 3D image postprocessing was performed.  CT dose reduction was achieved through use of a standardized protocol tailored for this examination and automatic exposure control for dose modulation. FINDINGS: MEDIASTINUM/ARTIE: No mass or lymphadenopathy. HEART/PERICARDIUM: Unremarkable.  AORTA:  No aneurysm. PULMONARY ARTERIES:  No pulmonary embolism.  Proximal level of embolus N/A. LUNGS/PLEURA: Emphysema. Thickening along the right major and minor fissures. Bronchial wall thickening. No pulmonary edema or pleural effusion. No pneumonia. INCIDENTALLY IMAGED UPPER ABDOMEN: Nonobstructing left renal calculus. BONES: No destructive bone lesion. ADDITIONAL COMMENTS:  N/A     1. No acute pulmonary embolus. 2. Emphysema with diffuse bronchial wall thickening and scarring along the right major/minor fissure. No pulmonary edema or pneumonia. 3. Nonobstructing left renal calculus. Electronically signed by [employer] CHEST PORTABLE  Result Date: 5/25/2025  INDICATION: Chest Pain  COMPARISON: January 6, 2024 FINDINGS: AP portable view of the chest demonstrates a normal cardiomediastinal silhouette. There is no edema, effusion, consolidation, or pneumothorax. The osseous structures are unremarkable.     No acute process. Electronically signed by [employer] in [place]    Imaging results impression onlyCTA CHEST W WO CONTRAST  Result Date: 5/26/2025  1. No acute pulmonary embolus. 2. Emphysema with diffuse bronchial wall thickening and scarring along the right major/minor fissure. No pulmonary edema or pneumonia. 3. Nonobstructing left renal calculus. Electronically signed by [employer] in [place]    XR CHEST PORTABLE  Result Date: 5/25/2025  No acute process. Electronically signed

## 2025-05-26 NOTE — H&P (VIEW-ONLY)
[facility]      Patient: Hortensia Carpenter MRN: [id number]  SSN: [id number]    YOB: 1945  Age: 80 y.o.  Sex: female    Date of Consultation: 5/26/2025    Reason for Consultation: Chest pain, elevated troponin  Non-STEMI    HPI:  I was asked by Dr. Farmer to see this pleasant patient for chest pain.  Hortensia Carpenter is a 80-year-old pleasant patient admitted in the hospital with chest fullness/pressure, uncontrolled hypertension and mild troponin elevation.  Patient ruled in for non-STEMI.  Pulmonary embolism was ruled out by CT scan.  Patient have known history of CAD remote history of STEMI and PCI with history of paroxysmal atrial tachycardia and also intermittent mild edema with COPD and asthma and panic attack also  At the time of consultation patient is feeling better  Troponin elevation is stable  Patient was seen in room 329 and her daughter was also present during consultation.  I have reviewed in detail with patient and daughter further ischemic testing with stress test versus cardiac catheterization.  Both of them agreed to proceed.               Past Medical History:   Diagnosis Date    Asthma     CAD (coronary artery disease)     MI    Cancer (HCC)     Cataract     LEFT and RIGHT    Chronic obstructive pulmonary disease (HCC)     Diverticulitis     Emphysema lung (HCC)     Hypertension     Melanoma (HCC)      Past Surgical History:   Procedure Laterality Date    GYN      HYSTERECTOMY (CERVIX STATUS UNKNOWN)      ORTHOPEDIC SURGERY      cervical, lumbar    SC UNLISTED PROCEDURE CARDIAC SURGERY      04/16 Stent     Current Facility-Administered Medications   Medication Dose Route Frequency    heparin (porcine) injection 4,000 Units  4,000 Units IntraVENous PRN    heparin (porcine) injection 2,000 Units  2,000 Units IntraVENous PRN    heparin 25,000 units in dextrose 5% 250 mL (premix) infusion  5-30 Units/kg/hr IntraVENous Continuous    sodium chloride flush 0.9 % injection 5-40 mL

## 2025-05-26 NOTE — ED PROVIDER NOTES
[facility] EMERGENCY DEPT  EMERGENCY DEPARTMENT ENCOUNTER    Patient Name: Hortensia Carpenter  MRN: [id number]  YOB: 1945  Provider: Saul Farmer MD  PCP: Sandy Ramirez DO   Time/Date of evaluation: 11:18 PM EDT on 5/25/25    History of Presenting Illness     Chief Complaint   Patient presents with    Anxiety       History Provided by: EMS and Patient   History is limited by: Nothing    HISTORY (Narative):   Hortensia Carpenter is a 80 y.o. female with a PMHX of asthma, CAD, cataracts, COPD, hypertension, melanoma  who presents to the emergency department (room 1) by EMS C/O chest discomfort onset this evening. Associated sxs include anxiousness. Patient denies any other sxs or complaints.  Patient states that she had a heart attack in 2017 and following that had about 7 visits to the ED for anxiousness.  She has not had any repeat issues since that time howeverfeels that this may be similar anxiousness.    Nursing Notes were all reviewed and agreed with or any disagreements were addressed in the HPI.    Past History     PAST MEDICAL HISTORY:  Past Medical History:   Diagnosis Date    Asthma     CAD (coronary artery disease)     MI    Cancer (HCC)     Cataract     LEFT and RIGHT    Chronic obstructive pulmonary disease (HCC)     Diverticulitis     Emphysema lung (HCC)     Hypertension     Melanoma (HCC)        PAST SURGICAL HISTORY:  Past Surgical History:   Procedure Laterality Date    GYN      HYSTERECTOMY (CERVIX STATUS UNKNOWN)      ORTHOPEDIC SURGERY      cervical, lumbar    [place] UNLISTED PROCEDURE CARDIAC SURGERY      04/16 Stent       FAMILY HISTORY:  No family history on file.    SOCIAL HISTORY:  Social History     Tobacco Use    Smoking status: Former    Smokeless tobacco: Never   Substance Use Topics    Alcohol use: Yes    Drug use: No       MEDICATIONS:  Current Facility-Administered Medications   Medication Dose Route Frequency Provider Last Rate Last Admin    heparin (porcine) injection 4,000

## 2025-05-27 ENCOUNTER — APPOINTMENT (OUTPATIENT)
Facility: HOSPITAL | Age: 80
DRG: 287 | End: 2025-05-27
Attending: INTERNAL MEDICINE
Payer: MEDICARE

## 2025-05-27 VITALS
OXYGEN SATURATION: 100 % | WEIGHT: 168 LBS | RESPIRATION RATE: 20 BRPM | SYSTOLIC BLOOD PRESSURE: 126 MMHG | BODY MASS INDEX: 27.99 KG/M2 | HEIGHT: 65 IN | DIASTOLIC BLOOD PRESSURE: 40 MMHG | HEART RATE: 64 BPM | TEMPERATURE: 98.1 F

## 2025-05-27 PROBLEM — I21.4 NSTEMI (NON-ST ELEVATED MYOCARDIAL INFARCTION) (HCC): Status: RESOLVED | Noted: 2018-05-04 | Resolved: 2025-05-27

## 2025-05-27 LAB
ANION GAP SERPL CALC-SCNC: 12 MMOL/L (ref 3–18)
APTT PPP: 101 SEC (ref 23–36.4)
APTT PPP: 97.1 SEC (ref 23–36.4)
BUN SERPL-MCNC: 11 MG/DL (ref 6–23)
BUN/CREAT SERPL: 16 (ref 12–20)
CALCIUM SERPL-MCNC: 8.8 MG/DL (ref 8.5–10.1)
CHLORIDE SERPL-SCNC: 103 MMOL/L (ref 98–107)
CHOLEST SERPL-MCNC: 170 MG/DL
CO2 SERPL-SCNC: 26 MMOL/L (ref 21–32)
CREAT SERPL-MCNC: 0.7 MG/DL (ref 0.6–1.3)
ECHO AO ROOT DIAM: 2.8 CM
ECHO AO ROOT INDEX: 1.52 CM/M2
ECHO AV AREA PEAK VELOCITY: 2.4 CM2
ECHO AV AREA VTI: 2.7 CM2
ECHO AV AREA/BSA PEAK VELOCITY: 1.3 CM2/M2
ECHO AV AREA/BSA VTI: 1.5 CM2/M2
ECHO AV MEAN GRADIENT: 3 MMHG
ECHO AV MEAN VELOCITY: 0.8 M/S
ECHO AV PEAK GRADIENT: 5 MMHG
ECHO AV PEAK VELOCITY: 1.1 M/S
ECHO AV VELOCITY RATIO: 0.73
ECHO AV VTI: 25.1 CM
ECHO BSA: 1.87 M2
ECHO BSA: 1.87 M2
ECHO EST RA PRESSURE: 3 MMHG
ECHO LA VOL A-L A2C: 55 ML (ref 22–52)
ECHO LA VOL A-L A4C: 51 ML (ref 22–52)
ECHO LA VOL BP: 51 ML (ref 22–52)
ECHO LA VOL MOD A2C: 53 ML (ref 22–52)
ECHO LA VOL MOD A4C: 46 ML (ref 22–52)
ECHO LA VOL/BSA BIPLANE: 28 ML/M2 (ref 16–34)
ECHO LA VOLUME AREA LENGTH: 54 ML
ECHO LA VOLUME INDEX A-L A2C: 30 ML/M2 (ref 16–34)
ECHO LA VOLUME INDEX A-L A4C: 28 ML/M2 (ref 16–34)
ECHO LA VOLUME INDEX AREA LENGTH: 29 ML/M2 (ref 16–34)
ECHO LA VOLUME INDEX MOD A2C: 29 ML/M2 (ref 16–34)
ECHO LA VOLUME INDEX MOD A4C: 25 ML/M2 (ref 16–34)
ECHO LV E' LATERAL VELOCITY: 9.77 CM/S
ECHO LV E' SEPTAL VELOCITY: 4.61 CM/S
ECHO LV EDV A2C: 77 ML
ECHO LV EDV A4C: 80 ML
ECHO LV EDV BP: 79 ML (ref 56–104)
ECHO LV EDV INDEX A4C: 43 ML/M2
ECHO LV EDV INDEX BP: 43 ML/M2
ECHO LV EDV NDEX A2C: 42 ML/M2
ECHO LV EF PHYSICIAN: 50 %
ECHO LV EJECTION FRACTION A2C: 52 %
ECHO LV EJECTION FRACTION A4C: 52 %
ECHO LV EJECTION FRACTION BIPLANE: 52 % (ref 55–100)
ECHO LV ESV A2C: 37 ML
ECHO LV ESV A4C: 38 ML
ECHO LV ESV BP: 38 ML (ref 19–49)
ECHO LV ESV INDEX A2C: 20 ML/M2
ECHO LV ESV INDEX A4C: 21 ML/M2
ECHO LV ESV INDEX BP: 21 ML/M2
ECHO LV FRACTIONAL SHORTENING: 28 % (ref 28–44)
ECHO LV INTERNAL DIMENSION DIASTOLE INDEX: 2.88 CM/M2
ECHO LV INTERNAL DIMENSION DIASTOLIC: 5.3 CM (ref 3.9–5.3)
ECHO LV INTERNAL DIMENSION SYSTOLIC INDEX: 2.07 CM/M2
ECHO LV INTERNAL DIMENSION SYSTOLIC: 3.8 CM
ECHO LV IVSD: 0.8 CM (ref 0.6–0.9)
ECHO LV MASS 2D: 150.1 G (ref 67–162)
ECHO LV MASS INDEX 2D: 81.6 G/M2 (ref 43–95)
ECHO LV POSTERIOR WALL DIASTOLIC: 0.8 CM (ref 0.6–0.9)
ECHO LV RELATIVE WALL THICKNESS RATIO: 0.3
ECHO LVOT AREA: 3.1 CM2
ECHO LVOT AV VTI INDEX: 0.83
ECHO LVOT DIAM: 2 CM
ECHO LVOT MEAN GRADIENT: 2 MMHG
ECHO LVOT PEAK GRADIENT: 3 MMHG
ECHO LVOT PEAK VELOCITY: 0.8 M/S
ECHO LVOT STROKE VOLUME INDEX: 35.5 ML/M2
ECHO LVOT SV: 65.3 ML
ECHO LVOT VTI: 20.8 CM
ECHO MV A VELOCITY: 0.98 M/S
ECHO MV E DECELERATION TIME (DT): 110.2 MS
ECHO MV E VELOCITY: 0.95 M/S
ECHO MV E/A RATIO: 0.97
ECHO MV E/E' LATERAL: 9.72
ECHO MV E/E' RATIO (AVERAGED): 15.17
ECHO MV E/E' SEPTAL: 20.61
ECHO RA END SYSTOLIC VOLUME APICAL 4 CHAMBER INDEX BSA: 24 ML/M2
ECHO RA VOLUME: 45 ML
ECHO RV FREE WALL PEAK S': 18 CM/S
ECHO RV INTERNAL DIMENSION: 2.8 CM
ECHO RV TAPSE: 1.7 CM (ref 1.7–?)
EKG ATRIAL RATE: 108 BPM
EKG ATRIAL RATE: 59 BPM
EKG ATRIAL RATE: 69 BPM
EKG DIAGNOSIS: NORMAL
EKG P AXIS: 69 DEGREES
EKG P AXIS: 79 DEGREES
EKG P AXIS: 84 DEGREES
EKG P-R INTERVAL: 178 MS
EKG P-R INTERVAL: 184 MS
EKG P-R INTERVAL: 184 MS
EKG Q-T INTERVAL: 332 MS
EKG Q-T INTERVAL: 418 MS
EKG Q-T INTERVAL: 426 MS
EKG QRS DURATION: 88 MS
EKG QRS DURATION: 92 MS
EKG QRS DURATION: 96 MS
EKG QTC CALCULATION (BAZETT): 421 MS
EKG QTC CALCULATION (BAZETT): 444 MS
EKG QTC CALCULATION (BAZETT): 447 MS
EKG R AXIS: 49 DEGREES
EKG R AXIS: 51 DEGREES
EKG R AXIS: 51 DEGREES
EKG T AXIS: 62 DEGREES
EKG T AXIS: 62 DEGREES
EKG T AXIS: 77 DEGREES
EKG VENTRICULAR RATE: 108 BPM
EKG VENTRICULAR RATE: 59 BPM
EKG VENTRICULAR RATE: 69 BPM
ERYTHROCYTE [DISTWIDTH] IN BLOOD BY AUTOMATED COUNT: 12.3 % (ref 11.6–14.5)
EST. AVERAGE GLUCOSE BLD GHB EST-MCNC: 117 MG/DL
GLUCOSE SERPL-MCNC: 102 MG/DL (ref 74–108)
HBA1C MFR BLD: 5.7 % (ref 4.2–5.6)
HCT VFR BLD AUTO: 31.8 % (ref 35–45)
HDLC SERPL-MCNC: 56 MG/DL (ref 40–60)
HDLC SERPL: 3.1 (ref 0–5)
HGB BLD-MCNC: 9.9 G/DL (ref 12–16)
INR PPP: 1 (ref 0.9–1.1)
LDLC SERPL CALC-MCNC: 101 MG/DL (ref 0–100)
MAGNESIUM SERPL-MCNC: 2 MG/DL (ref 1.6–2.6)
MCH RBC QN AUTO: 31.5 PG (ref 24–34)
MCHC RBC AUTO-ENTMCNC: 31.1 G/DL (ref 31–37)
MCV RBC AUTO: 101.3 FL (ref 78–100)
NRBC # BLD: 0 K/UL (ref 0–0.01)
NRBC BLD-RTO: 0 PER 100 WBC
NT PRO BNP: 1452 PG/ML (ref 36–1800)
PLATELET # BLD AUTO: 241 K/UL (ref 135–420)
PMV BLD AUTO: 9.6 FL (ref 9.2–11.8)
POTASSIUM SERPL-SCNC: 3.9 MMOL/L (ref 3.5–5.5)
PROTHROMBIN TIME: 13.3 SEC (ref 11.9–14.9)
RBC # BLD AUTO: 3.14 M/UL (ref 4.2–5.3)
SODIUM SERPL-SCNC: 141 MMOL/L (ref 136–145)
TRIGL SERPL-MCNC: 68 MG/DL (ref 0–150)
TROPONIN T SERPL HS-MCNC: 27.1 NG/L (ref 0–14)
VLDLC SERPL CALC-MCNC: 14 MG/DL
WBC # BLD AUTO: 8.1 K/UL (ref 4.6–13.2)

## 2025-05-27 PROCEDURE — 83036 HEMOGLOBIN GLYCOSYLATED A1C: CPT

## 2025-05-27 PROCEDURE — 80061 LIPID PANEL: CPT

## 2025-05-27 PROCEDURE — 99152 MOD SED SAME PHYS/QHP 5/>YRS: CPT | Performed by: INTERNAL MEDICINE

## 2025-05-27 PROCEDURE — 4A023N7 MEASUREMENT OF CARDIAC SAMPLING AND PRESSURE, LEFT HEART, PERCUTANEOUS APPROACH: ICD-10-PCS | Performed by: INTERNAL MEDICINE

## 2025-05-27 PROCEDURE — 6360000002 HC RX W HCPCS: Performed by: INTERNAL MEDICINE

## 2025-05-27 PROCEDURE — 83735 ASSAY OF MAGNESIUM: CPT

## 2025-05-27 PROCEDURE — 80048 BASIC METABOLIC PNL TOTAL CA: CPT

## 2025-05-27 PROCEDURE — 6360000004 HC RX CONTRAST MEDICATION: Performed by: INTERNAL MEDICINE

## 2025-05-27 PROCEDURE — 85610 PROTHROMBIN TIME: CPT

## 2025-05-27 PROCEDURE — 2500000003 HC RX 250 WO HCPCS: Performed by: INTERNAL MEDICINE

## 2025-05-27 PROCEDURE — 36415 COLL VENOUS BLD VENIPUNCTURE: CPT

## 2025-05-27 PROCEDURE — 85027 COMPLETE CBC AUTOMATED: CPT

## 2025-05-27 PROCEDURE — 93458 L HRT ARTERY/VENTRICLE ANGIO: CPT | Performed by: INTERNAL MEDICINE

## 2025-05-27 PROCEDURE — C1894 INTRO/SHEATH, NON-LASER: HCPCS | Performed by: INTERNAL MEDICINE

## 2025-05-27 PROCEDURE — 83880 ASSAY OF NATRIURETIC PEPTIDE: CPT

## 2025-05-27 PROCEDURE — 93306 TTE W/DOPPLER COMPLETE: CPT

## 2025-05-27 PROCEDURE — 93010 ELECTROCARDIOGRAM REPORT: CPT | Performed by: INTERNAL MEDICINE

## 2025-05-27 PROCEDURE — 84484 ASSAY OF TROPONIN QUANT: CPT

## 2025-05-27 PROCEDURE — 85730 THROMBOPLASTIN TIME PARTIAL: CPT

## 2025-05-27 PROCEDURE — C1769 GUIDE WIRE: HCPCS | Performed by: INTERNAL MEDICINE

## 2025-05-27 PROCEDURE — 6370000000 HC RX 637 (ALT 250 FOR IP): Performed by: EMERGENCY MEDICINE

## 2025-05-27 PROCEDURE — 2709999900 HC NON-CHARGEABLE SUPPLY: Performed by: INTERNAL MEDICINE

## 2025-05-27 PROCEDURE — B2111ZZ FLUOROSCOPY OF MULTIPLE CORONARY ARTERIES USING LOW OSMOLAR CONTRAST: ICD-10-PCS | Performed by: INTERNAL MEDICINE

## 2025-05-27 PROCEDURE — 6370000000 HC RX 637 (ALT 250 FOR IP): Performed by: INTERNAL MEDICINE

## 2025-05-27 RX ORDER — HEPARIN SODIUM 1000 [USP'U]/ML
INJECTION, SOLUTION INTRAVENOUS; SUBCUTANEOUS PRN
Status: DISCONTINUED | OUTPATIENT
Start: 2025-05-27 | End: 2025-05-27 | Stop reason: HOSPADM

## 2025-05-27 RX ORDER — SODIUM CHLORIDE 9 MG/ML
INJECTION, SOLUTION INTRAVENOUS PRN
Status: DISCONTINUED | OUTPATIENT
Start: 2025-05-27 | End: 2025-05-27 | Stop reason: HOSPADM

## 2025-05-27 RX ORDER — MIDAZOLAM HYDROCHLORIDE 1 MG/ML
INJECTION INTRAMUSCULAR; INTRAVENOUS PRN
Status: DISCONTINUED | OUTPATIENT
Start: 2025-05-27 | End: 2025-05-27 | Stop reason: HOSPADM

## 2025-05-27 RX ORDER — LIDOCAINE HYDROCHLORIDE 10 MG/ML
INJECTION, SOLUTION INFILTRATION; PERINEURAL PRN
Status: DISCONTINUED | OUTPATIENT
Start: 2025-05-27 | End: 2025-05-27 | Stop reason: HOSPADM

## 2025-05-27 RX ORDER — METOPROLOL TARTRATE 25 MG/1
25 TABLET, FILM COATED ORAL 2 TIMES DAILY
Status: DISCONTINUED | OUTPATIENT
Start: 2025-05-27 | End: 2025-05-27 | Stop reason: HOSPADM

## 2025-05-27 RX ORDER — SODIUM CHLORIDE 0.9 % (FLUSH) 0.9 %
5-40 SYRINGE (ML) INJECTION PRN
Status: DISCONTINUED | OUTPATIENT
Start: 2025-05-27 | End: 2025-05-27 | Stop reason: HOSPADM

## 2025-05-27 RX ORDER — ENOXAPARIN SODIUM 100 MG/ML
40 INJECTION SUBCUTANEOUS DAILY
Status: DISCONTINUED | OUTPATIENT
Start: 2025-05-27 | End: 2025-05-27 | Stop reason: HOSPADM

## 2025-05-27 RX ORDER — HEPARIN SODIUM 200 [USP'U]/100ML
INJECTION, SOLUTION INTRAVENOUS CONTINUOUS PRN
Status: COMPLETED | OUTPATIENT
Start: 2025-05-27 | End: 2025-05-27

## 2025-05-27 RX ORDER — IOPAMIDOL 612 MG/ML
INJECTION, SOLUTION INTRAVASCULAR PRN
Status: DISCONTINUED | OUTPATIENT
Start: 2025-05-27 | End: 2025-05-27 | Stop reason: HOSPADM

## 2025-05-27 RX ORDER — VERAPAMIL HYDROCHLORIDE 2.5 MG/ML
INJECTION INTRAVENOUS PRN
Status: DISCONTINUED | OUTPATIENT
Start: 2025-05-27 | End: 2025-05-27 | Stop reason: HOSPADM

## 2025-05-27 RX ORDER — ACETAMINOPHEN 325 MG/1
650 TABLET ORAL EVERY 4 HOURS PRN
Status: DISCONTINUED | OUTPATIENT
Start: 2025-05-27 | End: 2025-05-27 | Stop reason: HOSPADM

## 2025-05-27 RX ORDER — SODIUM CHLORIDE 0.9 % (FLUSH) 0.9 %
5-40 SYRINGE (ML) INJECTION EVERY 12 HOURS SCHEDULED
Status: DISCONTINUED | OUTPATIENT
Start: 2025-05-27 | End: 2025-05-27 | Stop reason: HOSPADM

## 2025-05-27 RX ADMIN — SPIRONOLACTONE 12.5 MG: 25 TABLET ORAL at 10:20

## 2025-05-27 RX ADMIN — LOSARTAN POTASSIUM 25 MG: 50 TABLET, FILM COATED ORAL at 10:21

## 2025-05-27 RX ADMIN — BISACODYL 5 MG: 5 TABLET, COATED ORAL at 10:21

## 2025-05-27 RX ADMIN — NITROGLYCERIN 0.5 INCH: 20 OINTMENT TOPICAL at 02:15

## 2025-05-27 RX ADMIN — NITROGLYCERIN 0.5 INCH: 20 OINTMENT TOPICAL at 10:21

## 2025-05-27 RX ADMIN — CARVEDILOL 3.12 MG: 3.12 TABLET, FILM COATED ORAL at 10:27

## 2025-05-27 RX ADMIN — ASPIRIN 81 MG: 81 TABLET, CHEWABLE ORAL at 10:22

## 2025-05-27 ASSESSMENT — PAIN SCALES - GENERAL
PAINLEVEL_OUTOF10: 1
PAINLEVEL_OUTOF10: 2

## 2025-05-27 ASSESSMENT — PAIN DESCRIPTION - LOCATION: LOCATION: CHEST

## 2025-05-27 NOTE — PROGRESS NOTES
AVS reviewed with pt, all questions answered. Pt educated on medication uses and side effects, how/when to take meds, follow up appointments, and post cath discharge instructions. Pt verbalized understanding. Pt being discharged home.

## 2025-05-27 NOTE — PROGRESS NOTES
Cardiology Progress Note        Patient: Hortensia Carpenter        Sex: female          DOA: 5/25/2025  YOB: 1945      Age:  80 y.o.        LOS:  LOS: 1 day   Assessment/Plan     Principal Problem:    Chest pain with high risk for cardiac etiology  Active Problems:    NSTEMI (non-ST elevated myocardial infarction) (HCC)    CAD (coronary artery disease)    Benign essential HTN    ST elevation (STEMI) myocardial infarction involving right coronary artery (HCC)    Dyslipidemia, goal LDL below 70    COPD (chronic obstructive pulmonary disease) (HCC)    Melanoma (HCC)  Resolved Problems:    * No resolved hospital problems. *      Plan:  Chest pain  Non-STEMI      Plan is left heart catheterization possible PCI.All risks, benefits and alternatives explained to patient&daughter and they verbally understood.  Discussed with patient about risk of cardiac catheterization including but not limited to hematoma, retroperitoneal bleed, pseudoaneurysm, AV fistula, dissection, thrombosis and embolism, radial artery occlusion, myocardial infarction, CVA, TIA, dissection and perforation of great vessels, cardiac perforation, tamponade, atheroembolism, allergy reaction, infection, acute renal failure, arrhythmias, radiation injury, congestive heart failure, respiratory failure, multiorgan failure, death.  Patient agreed for procedure    Rest of the recommendation after cardiac catheterization.              Subjective:    cc:  Chest pain  Non-STEMI        REVIEW OF SYSTEMS:     General: No fevers or chills.  Cardiovascular: No chest pain or pressure. No palpitations. No ankle swelling  Pulmonary: No SOB, orthopnea, PND  Gastrointestinal: No nausea, vomiting or diarrhea      Objective:      Visit Vitals  BP (!) 148/62   Pulse 98   Temp 98.7 °F (37.1 °C) (Oral)   Resp 19   Ht 1.651 m (5' 5\")   Wt 76.2 kg (168 lb)   SpO2 96%   Breastfeeding No   BMI 27.96 kg/m²     Body mass index is 27.96 kg/m².    Physical Exam:  General

## 2025-05-27 NOTE — DISCHARGE INSTRUCTIONS
HEART CATHETERIZATION/ANGIOGRAPHY DISCHARGE INSTRUCTIONS    Check puncture site frequently for swelling or bleeding. If there is any bleeding, lie down and apply pressure over the area with a clean towel or washcloth. Notify your doctor for any redness, swelling, drainage, or oozing from the puncture site. Notify your doctor for any fever or chills.  If the extremity becomes cold, numb, or painful go to ER  Activity should be limited for the next 48 hours. Climb stairs as little as possible and avoid any stooping, bending, or strenuous activity for 48 hours. No heavy lifting (anything over 10 pounds) for 7 days.  You may resume your usual diet. Drink more fluids than usual.  Have a responsible person drive you home and stay with you for at least 24 hours after your heart catheterization/angiography.  You may remove bandage from your Right and Arm in 24 hours. You may shower in 24 hours. No tub baths, hot tubs, or swimming for 1 week. Do not place any lotions, creams, powders, or ointments over puncture site for 1 week. You may place a clean band-aid over the puncture site each day for 5 days. Change daily.  I have read the above instructions and have had the opportunity to ask questions.  Patient armband removed and shredded        Patient: ________________________   Date: 5/27/2025    Witness: _______________________   Date: 5/27/2025

## 2025-05-27 NOTE — BRIEF OP NOTE
Brief Postoperative Note      Patient: Hortensia Carpenter  YOB: 1945  MRN: [id number]    Date of Procedure: 5/27/2025    Pre-Op Diagnosis Codes:      * NSTEMI (non-ST elevated myocardial infarction) ([facility]) [I21.4]    Post-Op Diagnosis: Same       Procedure(s):  Left heart cath / coronary angiography    Surgeon(s):  eRn Potts MD    Assistant:  * No surgical staff found *    Anesthesia: Other    Estimated Blood Loss (mL): Minimal    Complications: None    Specimens:   * No specimens in log *    Implants:  * No implants in log *      Drains: * No LDAs found *    Findings:  Infection Present At Time Of Surgery (PATOS) (choose all levels that have infection present):  No infection present  Other Findings:   Mild nonobstructive coronary artery disease.  Patent stent in the RCA with 20% in-stent restenosis.  Mild luminal irregularities elsewhere in coronary anatomy.  Angiogram reviewed with the patient and daughter.    Electronically signed by REN POTTS MD on 5/27/2025 at 3:09 PM

## 2025-05-27 NOTE — PROGRESS NOTES
[facility] contacted at ext. [phone] for remdesivir tp be [facility]. TRANSFER - OUT REPORT:    Verbal report given to jude on Hortensia Carpenter  being transferred to room 329 for routine progression of patient care       Report consisted of patient's Situation, Background, Assessment and   Recommendations(SBAR).     Information from the following report(s) ED SBARreviewed with the receiving nurse.           Lines:   Peripheral IV 05/26/25 Right Antecubital (Active)   Site Assessment Clean, dry & intact 05/27/25 1331   Line Status Flushed 05/27/25 1331   Line Care Connections checked and tightened 05/27/25 1331   Phlebitis Assessment No symptoms 05/27/25 1331   Infiltration Assessment 0 05/27/25 1331   Alcohol Cap Used Yes 05/27/25 1331   Dressing Status Clean, dry & intact 05/27/25 1331   Dressing Type Transparent 05/27/25 1331       Peripheral IV 05/26/25 Left Forearm (Active)   Site Assessment Clean, dry & intact 05/27/25 1330   Line Status Infusing 05/27/25 1330   Line Care Connections checked and tightened 05/27/25 1330   Phlebitis Assessment No symptoms 05/27/25 1330   Infiltration Assessment 0 05/27/25 1330   Alcohol Cap Used Yes 05/27/25 1330   Dressing Status Clean, dry & intact 05/27/25 1330   Dressing Type Transparent 05/27/25 1330        Opportunity for questions and clarification was provided.      Patient transported with:  Monitor and Registered Nurse

## 2025-05-27 NOTE — PLAN OF CARE
Problem: Pain  Goal: Verbalizes/displays adequate comfort level or baseline comfort level  5/26/2025 2250 by Katja Sánchez, RN  Outcome: Progressing  5/26/2025 0852 by Flor Novak, RN  Outcome: Progressing  Flowsheets (Taken 5/26/2025 0852)  Verbalizes/displays adequate comfort level or baseline comfort level:   Encourage patient to monitor pain and request assistance   Assess pain using appropriate pain scale     Problem: Safety - Adult  Goal: Free from fall injury  Outcome: Progressing

## 2025-05-27 NOTE — DISCHARGE SUMMARY
Hospitalist Discharge Summary    Patient: Hortensia Carpenter MRN: [id number]  [facility]: [id number]    YOB: 1945  Age: 80 y.o.  Sex: female    DOA: 5/25/2025 LOS:  LOS: 1 day   Discharge Date:      Primary Care Provider:  Sandy Ramirez DO    Admission Diagnoses: Shortness of breath [R06.02]  NSTEMI (non-ST elevated myocardial infarction) ([facility]) [I21.4]  Chest pain with high risk for cardiac etiology [R07.9]  Chest pain, unspecified type [R07.9]    Discharge Diagnoses:    Active Hospital Problems    Diagnosis     Chest pain with high risk for cardiac etiology [R07.9]     Melanoma ([facility]) [C43.9]     CAD (coronary artery disease) [I25.10]     Benign essential HTN [I10]     COPD (chronic obstructive pulmonary disease) ([facility]) [J44.9]     Dyslipidemia, goal LDL below 70 [E78.5]        Discharge Medications:        Medication List        CONTINUE taking these medications      albuterol 0.63 MG/3ML nebulizer solution  Commonly known as: ACCUNEB     aspirin 81 MG chewable tablet     atorvastatin 40 MG tablet  Commonly known as: LIPITOR     Brezyannicki Aerosphere 160-9-4.8 MCG/ACT Aero  Generic drug: Budeson-Glycopyrrol-Formoterol     CENTRUM/CERTA-CHINO with minerals oral solution     fish oil 1000 MG capsule     loratadine 10 MG tablet  Commonly known as: CLARITIN     losartan 25 MG tablet  Commonly known as: COZAAR     metoprolol tartrate 25 MG tablet  Commonly known as: LOPRESSOR     omeprazole 20 MG delayed release capsule  Commonly known as: PRILOSEC     spironolactone 25 MG tablet  Commonly known as: ALDACTONE     vitamin C 500 MG tablet  Commonly known as: ASCORBIC ACID              Discharge Condition: Good    Procedures done this admission:  Procedure(s):  Left heart cath / coronary angiography    Consults this admission:  IP CONSULT TO CARDIOLOGY  IP CONSULT TO HOSPITALIST  IP CONSULT

## 2025-05-27 NOTE — POST SEDATION
Sedation Post Procedure Note    Patient Name: Hortensia Carpenter   YOB: 1945  Room/Bed: [facility]/  Medical Record Number: [id number]  Date: 5/27/2025   Time: 3:10 PM         Physicians/Assistants: CHANTEL KELSEY MD, MD    Procedure Performed: LHC, coronary angiogram    Post-Sedation Vital Signs:  Vitals:    05/27/25 1501   BP: 136/63   Pulse: 80   Resp: 18   Temp:    SpO2: 97%      Vital signs were reviewed and were stable after the procedure (see flow sheet for vitals)            Post-Sedation Exam: Lungs: clear to auscultation bilaterally without crackles or wheezing and Cardiovascular: regular rate and rhythm, no murmurs rubs or gallops           Complications: none    Electronically signed by CHANTEL KELSEY MD on 5/27/2025 at 3:10 PM

## 2025-05-27 NOTE — INTERVAL H&P NOTE
Update History & Physical    The patient's History and Physical of May 27, 2025 was reviewed with the patient and I examined the patient. There was no change. The surgical site was confirmed by the patient and me.     Plan: The risks, benefits, expected outcome, and alternative to the recommended procedure have been discussed with the patient. Patient understands and wants to proceed with the procedure.     Electronically signed by CHANTEL KELSEY MD on 5/27/2025 at 1:30 PM

## (undated) DEVICE — STOPCOCK TRNSDUC 500PSI 3 W ROT M LUER LT BLU OFF HNDL R

## (undated) DEVICE — DRAPE,ANGIO,BRACH,STERILE,38X44: Brand: MEDLINE

## (undated) DEVICE — SENSOR PLSE OXMTR AD CBL L36IN ADH FRM FIT SPO2 DISP

## (undated) DEVICE — CATHETER DIAG 5FR L100CM LUMN ID0.047IN JR4 CRV 0 SIDE H

## (undated) DEVICE — TORCON NB ADVANTAGE CATHETER: Brand: TORCON NB

## (undated) DEVICE — GUIDEWIRE VASC L260CM DIA0.035IN TIP L3MM STD EXCHG PTFE J

## (undated) DEVICE — COVER US PRB W15XL120CM W/ GEL RUBBERBAND TAPE STRP FLD GEN

## (undated) DEVICE — DRAPE EP LT SUBCLAV ENTRY SHLD SORBX

## (undated) DEVICE — SPLINT WR VELC FOAM NEUT POS DISP FOR RAD ART ACC SFT STRP

## (undated) DEVICE — GLIDESHEATH SLENDER STAINLESS STEEL KIT: Brand: GLIDESHEATH SLENDER

## (undated) DEVICE — ELECTRODES PAIR QUIK COMBO CONN RTS DEFIB

## (undated) DEVICE — Device

## (undated) DEVICE — BAND COMPR L24CM REG CLR PLAS HEMSTAT EXT HK AND LOOP RETEN